# Patient Record
Sex: FEMALE | Race: OTHER | NOT HISPANIC OR LATINO | Employment: FULL TIME | URBAN - METROPOLITAN AREA
[De-identification: names, ages, dates, MRNs, and addresses within clinical notes are randomized per-mention and may not be internally consistent; named-entity substitution may affect disease eponyms.]

---

## 2022-02-01 ENCOUNTER — APPOINTMENT (OUTPATIENT)
Dept: LAB | Facility: CLINIC | Age: 47
End: 2022-02-01

## 2022-02-01 ENCOUNTER — APPOINTMENT (OUTPATIENT)
Dept: URGENT CARE | Facility: CLINIC | Age: 47
End: 2022-02-01

## 2022-02-01 DIAGNOSIS — Z02.1 PRE-EMPLOYMENT EXAMINATION: Primary | ICD-10-CM

## 2022-02-01 DIAGNOSIS — Z02.1 PRE-EMPLOYMENT EXAMINATION: ICD-10-CM

## 2022-02-01 LAB — RUBV IGG SERPL IA-ACNC: >175 IU/ML

## 2022-02-01 PROCEDURE — 86762 RUBELLA ANTIBODY: CPT

## 2022-02-01 PROCEDURE — 86480 TB TEST CELL IMMUN MEASURE: CPT

## 2022-02-01 PROCEDURE — 36415 COLL VENOUS BLD VENIPUNCTURE: CPT

## 2022-02-01 PROCEDURE — 86765 RUBEOLA ANTIBODY: CPT

## 2022-02-01 PROCEDURE — 86787 VARICELLA-ZOSTER ANTIBODY: CPT

## 2022-02-01 PROCEDURE — 86735 MUMPS ANTIBODY: CPT

## 2022-02-03 LAB
GAMMA INTERFERON BACKGROUND BLD IA-ACNC: 0.1 IU/ML
M TB IFN-G BLD-IMP: NEGATIVE
M TB IFN-G CD4+ BCKGRND COR BLD-ACNC: -0.01 IU/ML
M TB IFN-G CD4+ BCKGRND COR BLD-ACNC: 0.01 IU/ML
MEV IGG SER QL: ABNORMAL
MITOGEN IGNF BCKGRD COR BLD-ACNC: >10 IU/ML
MUV IGG SER QL: NORMAL
VZV IGG SER IA-ACNC: NORMAL

## 2022-02-23 DIAGNOSIS — Z01.84 IMMUNITY STATUS TESTING: Primary | ICD-10-CM

## 2022-02-24 ENCOUNTER — APPOINTMENT (OUTPATIENT)
Dept: LAB | Facility: HOSPITAL | Age: 47
End: 2022-02-24

## 2022-02-24 DIAGNOSIS — Z01.84 IMMUNITY STATUS TESTING: ICD-10-CM

## 2022-02-24 PROCEDURE — 36415 COLL VENOUS BLD VENIPUNCTURE: CPT

## 2022-02-24 PROCEDURE — 86765 RUBEOLA ANTIBODY: CPT

## 2022-02-28 LAB — MEV IGG SER QL: NORMAL

## 2022-03-18 ENCOUNTER — APPOINTMENT (OUTPATIENT)
Dept: LAB | Facility: CLINIC | Age: 47
End: 2022-03-18
Payer: COMMERCIAL

## 2022-03-18 ENCOUNTER — OFFICE VISIT (OUTPATIENT)
Dept: FAMILY MEDICINE CLINIC | Facility: CLINIC | Age: 47
End: 2022-03-18
Payer: COMMERCIAL

## 2022-03-18 VITALS
HEIGHT: 65 IN | HEART RATE: 79 BPM | BODY MASS INDEX: 42.42 KG/M2 | OXYGEN SATURATION: 97 % | WEIGHT: 254.6 LBS | SYSTOLIC BLOOD PRESSURE: 136 MMHG | DIASTOLIC BLOOD PRESSURE: 92 MMHG

## 2022-03-18 DIAGNOSIS — E55.9 VITAMIN D DEFICIENCY: ICD-10-CM

## 2022-03-18 DIAGNOSIS — K21.9 GASTROESOPHAGEAL REFLUX DISEASE WITHOUT ESOPHAGITIS: ICD-10-CM

## 2022-03-18 DIAGNOSIS — I10 BENIGN ESSENTIAL HYPERTENSION: Primary | ICD-10-CM

## 2022-03-18 DIAGNOSIS — G47.00 INSOMNIA, UNSPECIFIED TYPE: ICD-10-CM

## 2022-03-18 DIAGNOSIS — E66.01 MORBID OBESITY WITH BMI OF 40.0-44.9, ADULT (HCC): ICD-10-CM

## 2022-03-18 DIAGNOSIS — Z83.3 FH: DIABETES MELLITUS: ICD-10-CM

## 2022-03-18 DIAGNOSIS — Z12.39 SCREENING BREAST EXAMINATION: ICD-10-CM

## 2022-03-18 PROBLEM — G47.09 OTHER INSOMNIA: Status: ACTIVE | Noted: 2022-03-18

## 2022-03-18 LAB
25(OH)D3 SERPL-MCNC: 34.9 NG/ML (ref 30–100)
ALBUMIN SERPL BCP-MCNC: 3.8 G/DL (ref 3.5–5)
ALP SERPL-CCNC: 79 U/L (ref 46–116)
ALT SERPL W P-5'-P-CCNC: 25 U/L (ref 12–78)
ANION GAP SERPL CALCULATED.3IONS-SCNC: 7 MMOL/L (ref 4–13)
AST SERPL W P-5'-P-CCNC: 22 U/L (ref 5–45)
BASOPHILS # BLD AUTO: 0.04 THOUSANDS/ΜL (ref 0–0.1)
BASOPHILS NFR BLD AUTO: 1 % (ref 0–1)
BILIRUB SERPL-MCNC: 0.51 MG/DL (ref 0.2–1)
BUN SERPL-MCNC: 12 MG/DL (ref 5–25)
CALCIUM SERPL-MCNC: 9.4 MG/DL (ref 8.3–10.1)
CHLORIDE SERPL-SCNC: 103 MMOL/L (ref 100–108)
CHOLEST SERPL-MCNC: 189 MG/DL
CO2 SERPL-SCNC: 28 MMOL/L (ref 21–32)
CREAT SERPL-MCNC: 0.71 MG/DL (ref 0.6–1.3)
EOSINOPHIL # BLD AUTO: 0.31 THOUSAND/ΜL (ref 0–0.61)
EOSINOPHIL NFR BLD AUTO: 4 % (ref 0–6)
ERYTHROCYTE [DISTWIDTH] IN BLOOD BY AUTOMATED COUNT: 16.1 % (ref 11.6–15.1)
EST. AVERAGE GLUCOSE BLD GHB EST-MCNC: 134 MG/DL
GFR SERPL CREATININE-BSD FRML MDRD: 103 ML/MIN/1.73SQ M
GLUCOSE P FAST SERPL-MCNC: 108 MG/DL (ref 65–99)
HBA1C MFR BLD: 6.3 %
HCT VFR BLD AUTO: 38.2 % (ref 34.8–46.1)
HDLC SERPL-MCNC: 56 MG/DL
HGB BLD-MCNC: 11.8 G/DL (ref 11.5–15.4)
IMM GRANULOCYTES # BLD AUTO: 0.04 THOUSAND/UL (ref 0–0.2)
IMM GRANULOCYTES NFR BLD AUTO: 1 % (ref 0–2)
LDLC SERPL CALC-MCNC: 119 MG/DL (ref 0–100)
LYMPHOCYTES # BLD AUTO: 2.78 THOUSANDS/ΜL (ref 0.6–4.47)
LYMPHOCYTES NFR BLD AUTO: 34 % (ref 14–44)
MCH RBC QN AUTO: 23.7 PG (ref 26.8–34.3)
MCHC RBC AUTO-ENTMCNC: 30.9 G/DL (ref 31.4–37.4)
MCV RBC AUTO: 77 FL (ref 82–98)
MONOCYTES # BLD AUTO: 0.61 THOUSAND/ΜL (ref 0.17–1.22)
MONOCYTES NFR BLD AUTO: 7 % (ref 4–12)
NEUTROPHILS # BLD AUTO: 4.43 THOUSANDS/ΜL (ref 1.85–7.62)
NEUTS SEG NFR BLD AUTO: 53 % (ref 43–75)
NRBC BLD AUTO-RTO: 0 /100 WBCS
PLATELET # BLD AUTO: 333 THOUSANDS/UL (ref 149–390)
PMV BLD AUTO: 11.3 FL (ref 8.9–12.7)
POTASSIUM SERPL-SCNC: 3.4 MMOL/L (ref 3.5–5.3)
PROT SERPL-MCNC: 7.7 G/DL (ref 6.4–8.2)
RBC # BLD AUTO: 4.97 MILLION/UL (ref 3.81–5.12)
SODIUM SERPL-SCNC: 138 MMOL/L (ref 136–145)
TRIGL SERPL-MCNC: 69 MG/DL
TSH SERPL DL<=0.05 MIU/L-ACNC: 1.78 UIU/ML (ref 0.36–3.74)
WBC # BLD AUTO: 8.21 THOUSAND/UL (ref 4.31–10.16)

## 2022-03-18 PROCEDURE — 99204 OFFICE O/P NEW MOD 45 MIN: CPT | Performed by: PHYSICIAN ASSISTANT

## 2022-03-18 PROCEDURE — 83036 HEMOGLOBIN GLYCOSYLATED A1C: CPT | Performed by: PHYSICIAN ASSISTANT

## 2022-03-18 PROCEDURE — 80053 COMPREHEN METABOLIC PANEL: CPT | Performed by: PHYSICIAN ASSISTANT

## 2022-03-18 PROCEDURE — 84443 ASSAY THYROID STIM HORMONE: CPT | Performed by: PHYSICIAN ASSISTANT

## 2022-03-18 PROCEDURE — 36415 COLL VENOUS BLD VENIPUNCTURE: CPT | Performed by: PHYSICIAN ASSISTANT

## 2022-03-18 PROCEDURE — 82306 VITAMIN D 25 HYDROXY: CPT | Performed by: PHYSICIAN ASSISTANT

## 2022-03-18 PROCEDURE — 85025 COMPLETE CBC W/AUTO DIFF WBC: CPT | Performed by: PHYSICIAN ASSISTANT

## 2022-03-18 PROCEDURE — 80061 LIPID PANEL: CPT | Performed by: PHYSICIAN ASSISTANT

## 2022-03-18 RX ORDER — OMEPRAZOLE 40 MG/1
40 CAPSULE, DELAYED RELEASE ORAL
Qty: 30 CAPSULE | Refills: 0 | Status: SHIPPED | OUTPATIENT
Start: 2022-03-18

## 2022-03-18 RX ORDER — VERAPAMIL HYDROCHLORIDE 120 MG/1
120 CAPSULE, EXTENDED RELEASE ORAL DAILY
COMMUNITY
Start: 2022-03-07 | End: 2022-07-27 | Stop reason: SDUPTHER

## 2022-03-18 RX ORDER — LOSARTAN POTASSIUM 100 MG/1
100 TABLET ORAL DAILY
COMMUNITY
Start: 2022-03-07

## 2022-03-18 RX ORDER — ACETAMINOPHEN 160 MG
50 TABLET,DISINTEGRATING ORAL DAILY
COMMUNITY
Start: 2022-03-07 | End: 2022-06-01 | Stop reason: SDUPTHER

## 2022-03-18 RX ORDER — CHLORTHALIDONE 25 MG/1
12.5 TABLET ORAL DAILY
COMMUNITY
Start: 2022-03-07

## 2022-03-18 RX ORDER — AMITRIPTYLINE HYDROCHLORIDE 10 MG/1
20 TABLET, FILM COATED ORAL DAILY
COMMUNITY
Start: 2022-03-07

## 2022-03-18 NOTE — PATIENT INSTRUCTIONS
Assessment/plan:  1  Benign essential hypertension-presently stable with losartan, chlorthalidone, and verapamil, no medication changes  2   GERD -not at goal   Treatment options were discussed and I would recommend she try a 4 week course of omeprazole 40 mg once daily to see if she has benefit  3   Insomnia-patient does work 3rd shift and has difficulty with sleep during the day  She continues amitriptyline 20 mg at her bedtime which has been helpful  4   Obesity-continue with healthy diet and exercise plan  5   Screening for breast cancer -mammogram ordered  6   Cerumen impaction-recommend trial of Debrox over-the-counter  If symptoms worsen or she has hearing loss recommend follow-up for lavage

## 2022-03-18 NOTE — PROGRESS NOTES
Assessment and Plan:  Patient Instructions     Assessment/plan:  1  Benign essential hypertension-presently stable with losartan, chlorthalidone, and verapamil, no medication changes  2   GERD -not at goal   Treatment options were discussed and I would recommend she try a 4 week course of omeprazole 40 mg once daily to see if she has benefit  3   Insomnia-patient does work 3rd shift and has difficulty with sleep during the day  She continues amitriptyline 20 mg at her bedtime which has been helpful  4   Obesity-continue with healthy diet and exercise plan  5   Screening for breast cancer -mammogram ordered  6   Cerumen impaction-recommend trial of Debrox over-the-counter  If symptoms worsen or she has hearing loss recommend follow-up for lavage        Problem List Items Addressed This Visit        Cardiovascular and Mediastinum    Benign essential hypertension - Primary    Relevant Medications    losartan (COZAAR) 100 MG tablet    verapamil (VERELAN PM) 120 MG 24 hr capsule    chlorthalidone 25 mg tablet    Other Relevant Orders    CBC and differential    Comprehensive metabolic panel    Lipid Panel with Direct LDL reflex    TSH, 3rd generation with Free T4 reflex    Vitamin D 25 hydroxy       Other    Vitamin D deficiency    Relevant Orders    CBC and differential    Comprehensive metabolic panel    Lipid Panel with Direct LDL reflex    TSH, 3rd generation with Free T4 reflex    Vitamin D 25 hydroxy    Morbid obesity with BMI of 40 0-44 9, adult (HCC)      Other Visit Diagnoses     Insomnia, unspecified type        Gastroesophageal reflux disease without esophagitis        Relevant Medications    omeprazole (PriLOSEC) 40 MG capsule    FH: diabetes mellitus        Relevant Orders    Hemoglobin A1C    Screening breast examination        Relevant Orders    Mammo screening bilateral w 3d & cad                 Diagnoses and all orders for this visit:    Benign essential hypertension  -     CBC and differential  -     Comprehensive metabolic panel  -     Lipid Panel with Direct LDL reflex  -     TSH, 3rd generation with Free T4 reflex  -     Vitamin D 25 hydroxy    Vitamin D deficiency  -     CBC and differential  -     Comprehensive metabolic panel  -     Lipid Panel with Direct LDL reflex  -     TSH, 3rd generation with Free T4 reflex  -     Vitamin D 25 hydroxy    Insomnia, unspecified type    Gastroesophageal reflux disease without esophagitis  -     omeprazole (PriLOSEC) 40 MG capsule; Take 1 capsule (40 mg total) by mouth daily before breakfast    FH: diabetes mellitus  -     Hemoglobin A1C    Screening breast examination  -     Mammo screening bilateral w 3d & cad; Future    Morbid obesity with BMI of 40 0-44 9, adult ([facility] [address] )    Other orders  -     amitriptyline (ELAVIL) 10 mg tablet; Take 20 mg by mouth in the morning  -     losartan (COZAAR) 100 MG tablet; Take 100 mg by mouth in the morning  -     verapamil (VERELAN PM) 120 MG 24 hr capsule; Take 120 mg by mouth in the morning  -     Cholecalciferol (Vitamin D3) 50 MCG (2000 UT) capsule; Take 50 Units by mouth in the morning 50 mcg  -     chlorthalidone 25 mg tablet; Take 12 5 mg by mouth in the morning              Subjective:      Patient ID: Kandis Iyer is a 40 y o  female  CC:    Chief Complaint   Patient presents with    Establish Care       HPI:     HPI: This is a 76-year-old female who presents to the office as a new patient  She has recently taken a job with [employer] and changed from [facility]  She has been up-to-date with her routine screening test but states she is due for mammogram   She has a history of hypertension which has been controlled with chlorthalidone, verapamil, and losartan  She also is works night shift and uses amitriptyline 20 mg at bedtime to help her sleep  She does complain of some increased symptoms of esophageal reflux and this has been bothering her more than 2 times per week now    She has not had any blood in the stools or dark black stools  She does have significant family history of diabetes present  She also has high blood pressure in the family but no heart disease at a young age  The following portions of the patient's history were reviewed and updated as appropriate: allergies, current medications, past family history, past medical history, past social history, past surgical history and problem list       Review of Systems   Constitutional: Negative for chills, fatigue and fever  HENT: Negative for congestion, ear pain and sinus pressure  Eyes: Negative for visual disturbance  Respiratory: Negative for cough, chest tightness and shortness of breath  Cardiovascular: Negative for chest pain and palpitations  Gastrointestinal: Negative for diarrhea, nausea and vomiting  Endocrine: Negative for polyuria  Genitourinary: Negative for dysuria and frequency  Musculoskeletal: Negative for arthralgias and myalgias  Skin: Negative for pallor and rash  Neurological: Negative for dizziness, weakness, light-headedness, numbness and headaches  Psychiatric/Behavioral: Negative for agitation, behavioral problems and sleep disturbance  All other systems reviewed and are negative  Data to review:       Objective:    Vitals:    03/18/22 0807   BP: 136/92   BP Location: Left arm   Patient Position: Sitting   Cuff Size: Large   Pulse: 79   SpO2: 97%   Weight: 115 kg (254 lb 9 6 oz)   Height: 5' 5" (1 651 m)        Physical Exam  Constitutional:       General: She is not in acute distress  Appearance: Normal appearance  HENT:      Head: Normocephalic and atraumatic  Right Ear: Tympanic membrane normal       Left Ear: Tympanic membrane normal       Nose: No congestion or rhinorrhea  Eyes:      Conjunctiva/sclera: Conjunctivae normal       Pupils: Pupils are equal, round, and reactive to light  Neck:      Vascular: No carotid bruit     Cardiovascular:      Rate and Rhythm: Normal rate and regular rhythm  Heart sounds: No murmur heard  Pulmonary:      Effort: Pulmonary effort is normal  No respiratory distress  Breath sounds: Normal breath sounds  Abdominal:      Palpations: Abdomen is soft  Musculoskeletal:         General: Normal range of motion  Cervical back: Normal range of motion and neck supple  No muscular tenderness  Lymphadenopathy:      Cervical: No cervical adenopathy  Skin:     General: Skin is warm  Capillary Refill: Capillary refill takes less than 2 seconds  Neurological:      General: No focal deficit present  Mental Status: She is alert and oriented to person, place, and time  Psychiatric:         Mood and Affect: Mood normal              Depression Screening and Follow-up Plan: Patient was screened for depression during today's encounter  They screened negative with a PHQ-2 score of 0  BMI Counseling: Body mass index is 42 37 kg/m²  The BMI is above normal  Nutrition recommendations include reducing portion sizes

## 2022-03-21 ENCOUNTER — TELEPHONE (OUTPATIENT)
Dept: FAMILY MEDICINE CLINIC | Facility: CLINIC | Age: 47
End: 2022-03-21

## 2022-03-21 DIAGNOSIS — G47.00 INSOMNIA, UNSPECIFIED TYPE: ICD-10-CM

## 2022-03-21 DIAGNOSIS — K21.9 GASTROESOPHAGEAL REFLUX DISEASE WITHOUT ESOPHAGITIS: ICD-10-CM

## 2022-03-21 DIAGNOSIS — I10 BENIGN ESSENTIAL HYPERTENSION: Primary | ICD-10-CM

## 2022-03-21 DIAGNOSIS — E66.01 MORBID OBESITY WITH BMI OF 40.0-44.9, ADULT (HCC): ICD-10-CM

## 2022-03-21 DIAGNOSIS — E55.9 VITAMIN D DEFICIENCY: ICD-10-CM

## 2022-03-21 NOTE — TELEPHONE ENCOUNTER
----- Message from Oneal Veliz, [address] [place] sent at 3/21/2022  9:13 AM EDT -----  Patient notified of results and recommendations    Please submit Lab script to mail to patient per her request

## 2022-05-25 ENCOUNTER — APPOINTMENT (OUTPATIENT)
Dept: LAB | Facility: HOSPITAL | Age: 47
End: 2022-05-25

## 2022-05-25 DIAGNOSIS — Z00.8 ENCOUNTER FOR OTHER GENERAL EXAMINATION: ICD-10-CM

## 2022-05-25 LAB
CHOLEST SERPL-MCNC: 176 MG/DL
EST. AVERAGE GLUCOSE BLD GHB EST-MCNC: 134 MG/DL
HBA1C MFR BLD: 6.3 %
HDLC SERPL-MCNC: 51 MG/DL
LDLC SERPL CALC-MCNC: 110 MG/DL (ref 0–100)
NONHDLC SERPL-MCNC: 125 MG/DL
TRIGL SERPL-MCNC: 76 MG/DL

## 2022-05-25 PROCEDURE — 80061 LIPID PANEL: CPT

## 2022-05-25 PROCEDURE — 83036 HEMOGLOBIN GLYCOSYLATED A1C: CPT

## 2022-05-25 PROCEDURE — 36415 COLL VENOUS BLD VENIPUNCTURE: CPT

## 2022-06-01 DIAGNOSIS — E55.9 VITAMIN D DEFICIENCY: Primary | ICD-10-CM

## 2022-06-01 DIAGNOSIS — E55.9 VITAMIN D DEFICIENCY: ICD-10-CM

## 2022-06-01 RX ORDER — ACETAMINOPHEN 160 MG
2000 TABLET,DISINTEGRATING ORAL DAILY
Qty: 30 CAPSULE | Refills: 0 | Status: SHIPPED | OUTPATIENT
Start: 2022-06-01 | End: 2022-07-07 | Stop reason: SDUPTHER

## 2022-06-01 RX ORDER — ACETAMINOPHEN 160 MG
2000 TABLET,DISINTEGRATING ORAL DAILY
Qty: 30 CAPSULE | Refills: 5 | Status: SHIPPED | OUTPATIENT
Start: 2022-06-01 | End: 2022-06-01 | Stop reason: SDUPTHER

## 2022-06-01 NOTE — TELEPHONE ENCOUNTER
Requested Prescriptions     Pending Prescriptions Disp Refills    Cholecalciferol (Vitamin D3) 50 MCG (2000 UT) capsule 30 capsule 0     Sig: Take 1 capsule (2,000 Units total) by mouth in the morning 50 mcg     LOV 3/18/22, F/U 6/6/22, Labs pending

## 2022-07-07 ENCOUNTER — OFFICE VISIT (OUTPATIENT)
Dept: FAMILY MEDICINE CLINIC | Facility: CLINIC | Age: 47
End: 2022-07-07
Payer: COMMERCIAL

## 2022-07-07 VITALS
HEART RATE: 82 BPM | BODY MASS INDEX: 44.82 KG/M2 | WEIGHT: 269 LBS | OXYGEN SATURATION: 97 % | HEIGHT: 65 IN | TEMPERATURE: 97.9 F | DIASTOLIC BLOOD PRESSURE: 78 MMHG | SYSTOLIC BLOOD PRESSURE: 124 MMHG

## 2022-07-07 DIAGNOSIS — J01.00 ACUTE NON-RECURRENT MAXILLARY SINUSITIS: ICD-10-CM

## 2022-07-07 DIAGNOSIS — E66.01 MORBID OBESITY WITH BMI OF 40.0-44.9, ADULT (HCC): ICD-10-CM

## 2022-07-07 DIAGNOSIS — E55.9 VITAMIN D DEFICIENCY: ICD-10-CM

## 2022-07-07 DIAGNOSIS — I10 BENIGN ESSENTIAL HYPERTENSION: ICD-10-CM

## 2022-07-07 DIAGNOSIS — Z12.11 SCREENING FOR COLON CANCER: Primary | ICD-10-CM

## 2022-07-07 PROCEDURE — 99214 OFFICE O/P EST MOD 30 MIN: CPT | Performed by: PHYSICIAN ASSISTANT

## 2022-07-07 RX ORDER — ACETAMINOPHEN 160 MG
2000 TABLET,DISINTEGRATING ORAL DAILY
Qty: 30 CAPSULE | Refills: 0 | Status: SHIPPED | OUTPATIENT
Start: 2022-07-07 | End: 2022-08-03

## 2022-07-07 RX ORDER — CEFUROXIME AXETIL 500 MG/1
500 TABLET ORAL EVERY 12 HOURS SCHEDULED
Qty: 20 TABLET | Refills: 0 | Status: SHIPPED | OUTPATIENT
Start: 2022-07-07 | End: 2022-07-17

## 2022-07-07 NOTE — PROGRESS NOTES
Assessment and Plan:  Patient Instructions     Assessment/plan:   Acute sinusitis-will start patient on Ceftin 500 mg twice daily for 10 days  She has had multiple negative COVID test at home  Continue supportive care with Mucinex and rest   Follow up if symptoms persist/ worsen in the next 7-10 days  2   Obesity-recommend evaluation by [facility] weight management team   Consider medication assisted weight loss  3  Hypertension -stable with current regimen, no medication changes  Recommend avoiding decongestants as they may raise the blood pressure  Problem List Items Addressed This Visit        Cardiovascular and Mediastinum    Benign essential hypertension       Other    Morbid obesity with BMI of 40 0-44 9, adult [facility])    Relevant Orders    Ambulatory Referral to Weight Management      Other Visit Diagnoses     Screening for colon cancer    -  Primary    Relevant Orders    Ambulatory referral for colonoscopy    Acute non-recurrent maxillary sinusitis        Relevant Medications    cefuroxime (CEFTIN) 500 mg tablet                 Diagnoses and all orders for this visit:    Screening for colon cancer  -     Ambulatory referral for colonoscopy; Future    Acute non-recurrent maxillary sinusitis  -     cefuroxime (CEFTIN) 500 mg tablet; Take 1 tablet (500 mg total) by mouth every 12 (twelve) hours for 10 days    Morbid obesity with BMI of 40 0-44 9, adult ([facility] )  -     Ambulatory Referral to Weight Management; Future    Benign essential hypertension            Subjective:      Patient ID: Artur Orourke is a 40 y o  female  CC:    Chief Complaint   Patient presents with    Cough     Patient complaints of cough and headache for the past 2 weeks  HPI:      HPI:  This is a 81-year-old female who presents to the office with concerns over head congestion cold which has been going on for at least 2 weeks  She has been feeling fatigued and run down but has not had any fevers present    She is having a bit of cough and headache and head pressure  She has not had any ear pain or discomfort  She has been taking some over-the-counter Mucinex and continues with congestion  Initially some of her mucus was more thick but feels that it has become less so  She would also like to discuss weight loss  She has been gaining weight despite her efforts  The following portions of the patient's history were reviewed and updated as appropriate: allergies, current medications, past family history, past medical history, past social history, past surgical history and problem list       Review of Systems   Constitutional: Negative for chills, fatigue and fever  HENT: Negative for congestion, ear pain and sinus pressure  Eyes: Negative for visual disturbance  Respiratory: Negative for cough, chest tightness and shortness of breath  Cardiovascular: Negative for chest pain and palpitations  Gastrointestinal: Negative for diarrhea, nausea and vomiting  Endocrine: Negative for polyuria  Genitourinary: Negative for dysuria and frequency  Musculoskeletal: Negative for arthralgias and myalgias  Skin: Negative for pallor and rash  Neurological: Negative for dizziness, weakness, light-headedness, numbness and headaches  Psychiatric/Behavioral: Negative for agitation, behavioral problems and sleep disturbance  All other systems reviewed and are negative  Data to review:       Objective:    Vitals:    07/07/22 1439   BP: 124/78   BP Location: Left arm   Patient Position: Sitting   Cuff Size: Large   Pulse: 82   Temp: 97 9 °F (36 6 °C)   TempSrc: Tympanic   SpO2: 97%   Weight: 122 kg (269 lb)   Height: 5' 5" (1 651 m)        Physical Exam  Constitutional:       General: She is not in acute distress  Appearance: Normal appearance  HENT:      Head: Normocephalic and atraumatic  Right Ear: Tympanic membrane normal       Left Ear: Tympanic membrane normal       Nose: No congestion or rhinorrhea  Eyes:      Conjunctiva/sclera: Conjunctivae normal       Pupils: Pupils are equal, round, and reactive to light  Neck:      Vascular: No carotid bruit  Cardiovascular:      Rate and Rhythm: Normal rate and regular rhythm  Heart sounds: No murmur heard  Pulmonary:      Effort: Pulmonary effort is normal  No respiratory distress  Breath sounds: Normal breath sounds  Abdominal:      Palpations: Abdomen is soft  Musculoskeletal:         General: Normal range of motion  Cervical back: Normal range of motion and neck supple  No muscular tenderness  Lymphadenopathy:      Cervical: No cervical adenopathy  Skin:     General: Skin is warm  Capillary Refill: Capillary refill takes less than 2 seconds  Neurological:      General: No focal deficit present  Mental Status: She is alert and oriented to person, place, and time     Psychiatric:         Mood and Affect: Mood normal

## 2022-07-07 NOTE — PATIENT INSTRUCTIONS
Assessment/plan:   Acute sinusitis-will start patient on Ceftin 500 mg twice daily for 10 days  She has had multiple negative COVID test at home  Continue supportive care with Mucinex and rest   Follow up if symptoms persist/ worsen in the next 7-10 days  2   Obesity-recommend evaluation by [facility] weight management team   Consider medication assisted weight loss  3  Hypertension -stable with current regimen, no medication changes  Recommend avoiding decongestants as they may raise the blood pressure

## 2022-07-27 DIAGNOSIS — I10 BENIGN ESSENTIAL HYPERTENSION: Primary | ICD-10-CM

## 2022-07-27 DIAGNOSIS — I10 BENIGN ESSENTIAL HYPERTENSION: ICD-10-CM

## 2022-07-27 RX ORDER — VERAPAMIL HYDROCHLORIDE 120 MG/1
120 CAPSULE, EXTENDED RELEASE ORAL DAILY
Qty: 90 CAPSULE | Refills: 1 | Status: SHIPPED | OUTPATIENT
Start: 2022-07-27 | End: 2022-07-27 | Stop reason: SDUPTHER

## 2022-07-28 RX ORDER — VERAPAMIL HYDROCHLORIDE 120 MG/1
120 CAPSULE, EXTENDED RELEASE ORAL DAILY
Qty: 90 CAPSULE | Refills: 0 | Status: SHIPPED | OUTPATIENT
Start: 2022-07-28 | End: 2022-10-14 | Stop reason: SDUPTHER

## 2022-08-03 DIAGNOSIS — E55.9 VITAMIN D DEFICIENCY: ICD-10-CM

## 2022-08-03 RX ORDER — ACETAMINOPHEN 160 MG
TABLET,DISINTEGRATING ORAL
Qty: 30 CAPSULE | Refills: 0 | Status: SHIPPED | OUTPATIENT
Start: 2022-08-03 | End: 2022-09-07

## 2022-08-03 NOTE — TELEPHONE ENCOUNTER
Requested Prescriptions     Pending Prescriptions Disp Refills    Cholecalciferol (Vitamin D3) 50 MCG (2000 UT) capsule [Pharmacy Med Name: VITAMIN D3 50 MCG (2,000 UNIT)] 30 capsule 0     Sig: TAKE ONE CAPSULE BY MOUTH EVERY MORNING     LOV 7/7/22, F/U non scheduled, Labs some active

## 2022-09-06 ENCOUNTER — OFFICE VISIT (OUTPATIENT)
Dept: OBGYN CLINIC | Facility: MEDICAL CENTER | Age: 47
End: 2022-09-06
Payer: COMMERCIAL

## 2022-09-06 ENCOUNTER — APPOINTMENT (OUTPATIENT)
Dept: RADIOLOGY | Facility: MEDICAL CENTER | Age: 47
End: 2022-09-06
Payer: COMMERCIAL

## 2022-09-06 VITALS
HEART RATE: 79 BPM | DIASTOLIC BLOOD PRESSURE: 82 MMHG | SYSTOLIC BLOOD PRESSURE: 137 MMHG | HEIGHT: 65 IN | BODY MASS INDEX: 44.98 KG/M2 | WEIGHT: 270 LBS

## 2022-09-06 DIAGNOSIS — M17.0 PRIMARY OSTEOARTHRITIS OF BOTH KNEES: Primary | ICD-10-CM

## 2022-09-06 DIAGNOSIS — M25.561 CHRONIC PAIN OF BOTH KNEES: ICD-10-CM

## 2022-09-06 DIAGNOSIS — M25.562 CHRONIC PAIN OF BOTH KNEES: ICD-10-CM

## 2022-09-06 DIAGNOSIS — M17.0 ARTHRITIS OF BOTH KNEES: ICD-10-CM

## 2022-09-06 DIAGNOSIS — G89.29 CHRONIC PAIN OF BOTH KNEES: ICD-10-CM

## 2022-09-06 PROCEDURE — 73564 X-RAY EXAM KNEE 4 OR MORE: CPT

## 2022-09-06 PROCEDURE — 99204 OFFICE O/P NEW MOD 45 MIN: CPT | Performed by: ORTHOPAEDIC SURGERY

## 2022-09-06 PROCEDURE — 20610 DRAIN/INJ JOINT/BURSA W/O US: CPT | Performed by: ORTHOPAEDIC SURGERY

## 2022-09-06 RX ORDER — TRIAMCINOLONE ACETONIDE 40 MG/ML
20 INJECTION, SUSPENSION INTRA-ARTICULAR; INTRAMUSCULAR
Status: COMPLETED | OUTPATIENT
Start: 2022-09-06 | End: 2022-09-06

## 2022-09-06 RX ORDER — BUPIVACAINE HYDROCHLORIDE 2.5 MG/ML
2 INJECTION, SOLUTION INFILTRATION; PERINEURAL
Status: COMPLETED | OUTPATIENT
Start: 2022-09-06 | End: 2022-09-06

## 2022-09-06 RX ORDER — DICLOFENAC SODIUM 75 MG/1
75 TABLET, DELAYED RELEASE ORAL 2 TIMES DAILY PRN
Qty: 60 TABLET | Refills: 1 | Status: SHIPPED | OUTPATIENT
Start: 2022-09-06

## 2022-09-06 RX ADMIN — BUPIVACAINE HYDROCHLORIDE 2 ML: 2.5 INJECTION, SOLUTION INFILTRATION; PERINEURAL at 09:03

## 2022-09-06 RX ADMIN — TRIAMCINOLONE ACETONIDE 20 MG: 40 INJECTION, SUSPENSION INTRA-ARTICULAR; INTRAMUSCULAR at 09:03

## 2022-09-06 NOTE — PROGRESS NOTES
Assessment/Plan     1  Primary osteoarthritis of both knees    2  Chronic pain of both knees      Orders Placed This Encounter   Procedures    Large joint arthrocentesis: bilateral knee    Brace    XR knee 4+ vw right injury    XR knee 4+ vw left injury    Ambulatory Referral to Physical Therapy     -Non-operative treatments were discussed with the patient in the forms of corticosteroid injections, formal physical therapy, prescription mediation of diclofenac, bracing and viscosupplementation  -A prescription for Diclofenac was sent into the patient's pharmacy  -Discussed with the patient that she should stop taking Aleve while taking the diclofenac orally  She may continue using the diclofenac gel  -A prescription for formal physical therapy was provided to the patient at today's visit  I discussed with the patient I would recommend her going to formal physical therapy since she does exhibit limited range of motion   -She was fitted for bilateral hinge knee braces at today's visit  -Received bilateral knee steroid injection today  Patient should ice and avoid strenuous activity for 1-2 days if needed  Patient should avoid vaccines for 2 weeks if possible  If patient is diabetic should also monitor glucose over the next 7 to 10 days     -Discussed with the patient that in order for her to be a surgical candidate her BMI would have to be at 40 or below    Return in about 3 months (around 12/6/2022)  I answered all of the patient's questions during the visit and provided education of the patient's condition during the visit  The patient verbalized understanding of the information given and agrees with the plan  This note was dictated using [employer] software  It may contain errors including improperly dictated words  Please contact physician directly for any questions      History of Present Illness   Chief complaint:   Chief Complaint   Patient presents with    Right Knee - Pain    Left Knee - Pain HPI: Jessica Bello is a 40 y o  female that c/o bilateral knee pain  She states that her bilateral knee pain began years ago with no specific mechanism of injury or trauma  She states that her bilateral knee knee has progressively gotten worse over the past couple of years  She states that her right knee is worse than her left knee  She states that she will experience a daily intermittent sharp pain  She localizes her pain over the medial aspect of her bilateral knees  She states that her pain is exacerbated with weight-bearing activities especially ambulating down stairs    She will use Aleve and Voltaren gel to help alleviate her pain  She denies any surgeries or previous injuries to her bilateral knees  She states that she previously treated with [facility] and underwent bilateral knee corticosteroid injections and formal physical therapy  She states that the injections were about 1 year ago with no significant improvement of her bilateral knee pain  She denies diabetes  She denies smoking  ROS:    See HPI for musculoskeletal review  All other systems reviewed are negative     Historical Information   No past medical history on file  No past surgical history on file  Social History   Social History     Substance and Sexual Activity   Alcohol Use Never     Social History     Substance and Sexual Activity   Drug Use Never     Social History     Tobacco Use   Smoking Status Never Smoker   Smokeless Tobacco Never Used     Family History: No family history on file      Current Outpatient Medications on File Prior to Visit   Medication Sig Dispense Refill    amitriptyline (ELAVIL) 10 mg tablet Take 20 mg by mouth in the morning      chlorthalidone 25 mg tablet Take 12 5 mg by mouth in the morning      Cholecalciferol (Vitamin D3) 50 MCG (2000 UT) capsule TAKE ONE CAPSULE BY MOUTH EVERY MORNING 30 capsule 0    losartan (COZAAR) 100 MG tablet Take 100 mg by mouth in the morning  omeprazole (PriLOSEC) 40 MG capsule Take 1 capsule (40 mg total) by mouth daily before breakfast 30 capsule 0    verapamil (VERELAN PM) 120 MG 24 hr capsule Take 1 capsule (120 mg total) by mouth in the morning 90 capsule 0     No current facility-administered medications on file prior to visit  No Known Allergies    Current Outpatient Medications on File Prior to Visit   Medication Sig Dispense Refill    amitriptyline (ELAVIL) 10 mg tablet Take 20 mg by mouth in the morning      chlorthalidone 25 mg tablet Take 12 5 mg by mouth in the morning      Cholecalciferol (Vitamin D3) 50 MCG (2000 UT) capsule TAKE ONE CAPSULE BY MOUTH EVERY MORNING 30 capsule 0    losartan (COZAAR) 100 MG tablet Take 100 mg by mouth in the morning      omeprazole (PriLOSEC) 40 MG capsule Take 1 capsule (40 mg total) by mouth daily before breakfast 30 capsule 0    verapamil (VERELAN PM) 120 MG 24 hr capsule Take 1 capsule (120 mg total) by mouth in the morning 90 capsule 0     No current facility-administered medications on file prior to visit  Objective   Vitals: Blood pressure 137/82, pulse 79, height 5' 5" (1 651 m), weight 122 kg (270 lb)  ,Body mass index is 44 93 kg/m²      PE:  AAOx 3  WDWN  Hearing intact, no drainage from eyes  Regular rate  no audible wheezing  no abdominal distension  LE compartments soft, skin intact    bilateralknee:    Appearance:  Mild swelling   No ecchymosis  no obvious joint deformity   No effusion  Palpation/Tenderness:  +TTP over medial joint line  + TTP over lateral joint line   + TTP over patella  No TTP over patellar tendon  No TTP over pes anserine bursa  Active Range of Motion:  AROM: left 3-95, right 3-95  PROM: left 0-110, right 0-105  Special Tests:  Medial Eunice's Test:  Positive  Lateral Eunice's Test:  Negative  Apley's compression test:  Negative  Lachman's Test:  negative  Anterior Drawer Test:  Negative  Patellar grind:  Negative  Valgus Stress Test:  negative  Varus Stress Test:  negative     No ipsilateral hip pain with ROM    bilateralLE:    Sensation grossly intact  Palpable dorsalis  pulse  AT/GS/EHL intact    Imaging Studies: I have personally reviewed pertinent films in PACS  XR bilateralknee:  Severe medial and patellofemoral compartmental osteoarthritis  X-ray's demonstrates joint space narrowing, sclerosis and osteophytes  There are no acute fractures, dislocations, lytic or blastic lesions  Large joint arthrocentesis: bilateral knee  Universal Protocol:  Consent: Verbal consent obtained  Risks and benefits: risks, benefits and alternatives were discussed  Consent given by: patient  Time out: Immediately prior to procedure a "time out" was called to verify the correct patient, procedure, equipment, support staff and site/side marked as required    Timeout called at: 9/6/2022 9:02 AM   Site marked: the operative site was marked  Patient identity confirmed: verbally with patient    Supporting Documentation  Indications: pain   Procedure Details  Location: knee - bilateral knee  Preparation: Patient was prepped and draped in the usual sterile fashion  Needle size: 22 G  Ultrasound guidance: no  Approach: anterolateral    Medications (Right): 20 mg triamcinolone acetonide 40 mg/mL; 2 mL bupivacaine 0 25 %Medications (Left): 20 mg triamcinolone acetonide 40 mg/mL; 2 mL bupivacaine 0 25 %   Patient tolerance: patient tolerated the procedure well with no immediate complications  Dressing:  Sterile dressing applied          Scribe Attestation    I,:  Romy Barriga am acting as a scribe while in the presence of the attending physician :       I,:  Joie Huff DO personally performed the services described in this documentation    as scribed in my presence :

## 2022-09-07 DIAGNOSIS — E55.9 VITAMIN D DEFICIENCY: ICD-10-CM

## 2022-09-07 RX ORDER — ACETAMINOPHEN 160 MG
TABLET,DISINTEGRATING ORAL
Qty: 30 CAPSULE | Refills: 0 | Status: SHIPPED | OUTPATIENT
Start: 2022-09-07 | End: 2022-10-14 | Stop reason: SDUPTHER

## 2022-09-08 ENCOUNTER — HOSPITAL ENCOUNTER (OUTPATIENT)
Dept: MAMMOGRAPHY | Facility: CLINIC | Age: 47
Discharge: HOME | End: 2022-09-08

## 2022-09-09 ENCOUNTER — HOSPITAL ENCOUNTER (OUTPATIENT)
Dept: MAMMOGRAPHY | Facility: CLINIC | Age: 47
End: 2022-09-09
Payer: COMMERCIAL

## 2022-09-09 VITALS — HEIGHT: 65 IN | WEIGHT: 270 LBS | BODY MASS INDEX: 44.98 KG/M2

## 2022-09-09 DIAGNOSIS — Z12.31 VISIT FOR SCREENING MAMMOGRAM: ICD-10-CM

## 2022-09-09 DIAGNOSIS — Z12.39 SCREENING BREAST EXAMINATION: ICD-10-CM

## 2022-09-09 PROCEDURE — 77067 SCR MAMMO BI INCL CAD: CPT

## 2022-09-09 PROCEDURE — 77063 BREAST TOMOSYNTHESIS BI: CPT

## 2022-09-26 DIAGNOSIS — I10 BENIGN ESSENTIAL HYPERTENSION: Primary | ICD-10-CM

## 2022-09-26 DIAGNOSIS — I10 BENIGN ESSENTIAL HYPERTENSION: ICD-10-CM

## 2022-09-26 RX ORDER — LOSARTAN POTASSIUM 100 MG/1
100 TABLET ORAL DAILY
Qty: 90 TABLET | Refills: 1 | Status: SHIPPED | OUTPATIENT
Start: 2022-09-26 | End: 2022-09-26 | Stop reason: SDUPTHER

## 2022-09-26 NOTE — TELEPHONE ENCOUNTER
Last OV with Office: 7/7/2022   Last visit with PCP : 7/7/2022      Next visit with the Office :Visit date not found   Next visit with PCP : Visit date not found    Pending labs from 3/21/2022 still not completed but Lipid and A1C for caring starts with you

## 2022-09-27 RX ORDER — LOSARTAN POTASSIUM 100 MG/1
100 TABLET ORAL DAILY
Qty: 90 TABLET | Refills: 0 | Status: SHIPPED | OUTPATIENT
Start: 2022-09-27

## 2022-09-27 NOTE — TELEPHONE ENCOUNTER
Requested Prescriptions     Pending Prescriptions Disp Refills    losartan (COZAAR) 100 MG tablet 90 tablet 0     Sig: Take 1 tablet (100 mg total) by mouth in the morning     LOV 7/7/22, F/U non scheduled, Labs active

## 2022-10-06 ENCOUNTER — EVALUATION (OUTPATIENT)
Dept: PHYSICAL THERAPY | Facility: CLINIC | Age: 47
End: 2022-10-06
Payer: COMMERCIAL

## 2022-10-06 DIAGNOSIS — M25.562 CHRONIC PAIN OF BOTH KNEES: ICD-10-CM

## 2022-10-06 DIAGNOSIS — M25.561 CHRONIC PAIN OF BOTH KNEES: ICD-10-CM

## 2022-10-06 DIAGNOSIS — M17.0 PRIMARY OSTEOARTHRITIS OF BOTH KNEES: ICD-10-CM

## 2022-10-06 DIAGNOSIS — G89.29 CHRONIC PAIN OF BOTH KNEES: ICD-10-CM

## 2022-10-06 PROCEDURE — 97161 PT EVAL LOW COMPLEX 20 MIN: CPT | Performed by: PHYSICAL THERAPIST

## 2022-10-06 PROCEDURE — 97112 NEUROMUSCULAR REEDUCATION: CPT | Performed by: PHYSICAL THERAPIST

## 2022-10-06 NOTE — PROGRESS NOTES
PT Evaluation     Today's date: 10/6/2022  Patient name: Garth Enriquez  : 1975  MRN: [id number]  Referring provider: Alban Muhammad  Dx:   Encounter Diagnosis     ICD-10-CM    1  Primary osteoarthritis of both knees  M17 0 Ambulatory Referral to Physical Therapy   2  Chronic pain of both knees  M25 561 Ambulatory Referral to Physical Therapy    M25 562     G89 29                   Assessment  Assessment details: Pt is a 56y o  year old female presenting to physical therapy for Primary osteoarthritis of both knees, Chronic pain of both knees  She presents with the following impairments: gait abnormalities, b/l knee ROM deficits, patellar hypomobility, LE weakness worse on R LE, and TTP along joint lines worse medially b/l affecting her function with walking, standing, navigating stairs, getting out of chairs, and working  Pt will benefit from skilled physical therapy to address functional limitations noted in evaluation and meet patient goals  Impairments: abnormal gait, abnormal muscle firing, abnormal or restricted ROM, abnormal movement, activity intolerance, impaired balance, impaired physical strength, lacks appropriate home exercise program, pain with function, weight-bearing intolerance and poor body mechanics    Symptom irritability: high  Goals  ST  Pt will reduce pain to 5/10  2  Pt will improve R knee flexion to 100 degrees  LT  Pt will improve b/l knee extension strength to 4+/5 for improved ability to get out of chair  2  Pt will improve R knee flexion to 105+ degrees for improved ability to descend stairs  3  Pt will be I w HEP      Plan  Patient would benefit from: PT eval and skilled physical therapy  Planned modality interventions: biofeedback, electrical stimulation/Russian stimulation, cryotherapy, TENS, thermotherapy: hydrocollator packs and unattended electrical stimulation  Planned therapy interventions: manual therapy, abdominal trunk stabilization, joint mobilization, neuromuscular re-education, patient education, strengthening, stretching, therapeutic activities, therapeutic exercise, home exercise program, body mechanics training, balance, balance/weight bearing training, flexibility, gait training and Olivas taping  Frequency: 2x week  Duration in weeks: 6  Treatment plan discussed with: patient        Subjective Evaluation    History of Present Illness  Mechanism of injury: Pt reports b/l knee pain and OA for many years, but worse on her right knee  She has taken some Voltaren tablets as well as injections w some relief  She works at [employer] [place] [employer] and is always on her feet  She has difficulty walking long periods, navigating stairs, and getting out of the chair  She also reports Hx of brain surgery in 2020, but [facility] should be sending her files over soon  Recurrent probem    Pain  Current pain ratin  At worst pain ratin      Diagnostic Tests  X-ray: normal        Objective     Tenderness   Left Knee   Tenderness in the lateral joint line and medial joint line  Right Knee   Tenderness in the lateral joint line and medial joint line       Active Range of Motion   Left Knee   Flexion: 105 degrees   Extension: 0 degrees   Extensor la degrees     Right Knee   Flexion: 92 degrees with pain  Extension: 0 degrees with pain  Extensor la degrees     Passive Range of Motion   Left Knee   Flexion: 110 degrees with pain    Right Knee   Flexion: 95 degrees with pain    Mobility   Patellar Mobility:   Left Knee   Hypomobile: left medial, left lateral, left superior and left inferior    Right Knee   Hypomobile: medial, lateral, superior and inferior     Strength/Myotome Testing     Left Knee   Flexion: 4  Extension: 4+  Quadriceps contraction: fair    Right Knee   Flexion: 3+  Extension: 3+  Quadriceps contraction: poor    Additional Strength Details  SLS < 3secs b/l    Tests     Left Knee   Positive lateral Eunice, medial Eunice and patellar compression  Right Knee   Positive lateral Eunice, medial Eunice and patellar compression  Ambulation     Observational Gait   Gait: antalgic and asymmetric   Decreased walking speed and stride length                Precautions: b/l knee OA    Date 10/6            Visit # 1            FOTO IE             Re-eval IE              Manuals 10/6            B/l knee PROM SF            PFJ Mobs SF Gr I                                      Neuro Re-Ed 10/6            Clams  15x GTB HL            Bridge 2x5            Rockerboard             SLS                                                    Ther Ex 10/6            TM walking             Heel Slides HEP            Quad set             SLR 2x5            S/l hip ABD             Leg Press                                       Ther Activity                                       Gait Training                                       Modalities

## 2022-10-11 ENCOUNTER — APPOINTMENT (OUTPATIENT)
Dept: PHYSICAL THERAPY | Facility: CLINIC | Age: 47
End: 2022-10-11

## 2022-10-12 ENCOUNTER — OFFICE VISIT (OUTPATIENT)
Dept: PHYSICAL THERAPY | Facility: CLINIC | Age: 47
End: 2022-10-12
Payer: COMMERCIAL

## 2022-10-12 DIAGNOSIS — G89.29 CHRONIC PAIN OF BOTH KNEES: ICD-10-CM

## 2022-10-12 DIAGNOSIS — M25.562 CHRONIC PAIN OF BOTH KNEES: ICD-10-CM

## 2022-10-12 DIAGNOSIS — M25.561 CHRONIC PAIN OF BOTH KNEES: ICD-10-CM

## 2022-10-12 DIAGNOSIS — M17.0 PRIMARY OSTEOARTHRITIS OF BOTH KNEES: Primary | ICD-10-CM

## 2022-10-12 PROCEDURE — 97112 NEUROMUSCULAR REEDUCATION: CPT | Performed by: PHYSICAL THERAPIST

## 2022-10-12 PROCEDURE — 97110 THERAPEUTIC EXERCISES: CPT | Performed by: PHYSICAL THERAPIST

## 2022-10-12 NOTE — PROGRESS NOTES
Daily Note     Today's date: 10/12/2022  Patient name: Muriel Shaikh  : 1975  MRN: [id number]  Referring provider: Duane Gentry  Dx:   Encounter Diagnosis     ICD-10-CM    1  Primary osteoarthritis of both knees  M17 0    2  Chronic pain of both knees  M25 561     M25 562     G89 29                   Subjective: Pt reports her knees have been painful, but she has been completing her HEP at home  Objective: See treatment diary below      Assessment: Pt does well w progression of treatment session and is challenged appropriately  She does well w TM at low speed, and does well on leg press  She has some difficulty w SLR and slight pain w LAQs  Patient demonstrated fatigue post treatment and would benefit from continued PT  Plan: Continue per plan of care  Progress treatment as tolerated         Precautions: b/l knee OA    Date 10/6 10/12           Visit # 1 2           FOTO IE             Re-eval IE              Manuals 10/6 10/12           B/l knee PROM SF            PFJ Mobs SF Gr I                                      Neuro Re-Ed 10/6 10/12           Clams  15x GTB HL 2x15 BTB HL           Bridge 2x5 2x10           Rockerboard             SLS                                                    Ther Ex 10/6 10/12           TM walking  4'           Heel Slides HEP 20x5" SB w strap           Quad set  10x10"           SLR 2x5 10x ea           S/l hip ABD             Leg Press  4x10 75#           LAQ's  20x ea           Hamstring str  3x20" ea                        Ther Activity                                       Gait Training                                       Modalities

## 2022-10-13 ENCOUNTER — APPOINTMENT (OUTPATIENT)
Dept: PHYSICAL THERAPY | Facility: CLINIC | Age: 47
End: 2022-10-13

## 2022-10-14 ENCOUNTER — OFFICE VISIT (OUTPATIENT)
Dept: FAMILY MEDICINE CLINIC | Facility: CLINIC | Age: 47
End: 2022-10-14
Payer: COMMERCIAL

## 2022-10-14 VITALS — BODY MASS INDEX: 45.76 KG/M2 | WEIGHT: 275 LBS

## 2022-10-14 DIAGNOSIS — Z12.11 SCREEN FOR COLON CANCER: ICD-10-CM

## 2022-10-14 DIAGNOSIS — E66.01 MORBID OBESITY WITH BMI OF 40.0-44.9, ADULT (HCC): ICD-10-CM

## 2022-10-14 DIAGNOSIS — I67.1 BRAIN ANEURYSM: Primary | ICD-10-CM

## 2022-10-14 DIAGNOSIS — Z11.4 SCREENING FOR HIV (HUMAN IMMUNODEFICIENCY VIRUS): ICD-10-CM

## 2022-10-14 DIAGNOSIS — Z23 ENCOUNTER FOR IMMUNIZATION: ICD-10-CM

## 2022-10-14 DIAGNOSIS — Z12.4 SCREENING FOR CERVICAL CANCER: ICD-10-CM

## 2022-10-14 DIAGNOSIS — I10 BENIGN ESSENTIAL HYPERTENSION: ICD-10-CM

## 2022-10-14 DIAGNOSIS — Z11.59 NEED FOR HEPATITIS C SCREENING TEST: ICD-10-CM

## 2022-10-14 DIAGNOSIS — E55.9 VITAMIN D DEFICIENCY: ICD-10-CM

## 2022-10-14 PROCEDURE — 99214 OFFICE O/P EST MOD 30 MIN: CPT | Performed by: PHYSICIAN ASSISTANT

## 2022-10-14 PROCEDURE — 90471 IMMUNIZATION ADMIN: CPT | Performed by: PHYSICIAN ASSISTANT

## 2022-10-14 PROCEDURE — 90682 RIV4 VACC RECOMBINANT DNA IM: CPT | Performed by: PHYSICIAN ASSISTANT

## 2022-10-14 PROCEDURE — 93000 ELECTROCARDIOGRAM COMPLETE: CPT | Performed by: PHYSICIAN ASSISTANT

## 2022-10-14 RX ORDER — VERAPAMIL HYDROCHLORIDE 120 MG/1
120 CAPSULE, EXTENDED RELEASE ORAL DAILY
Qty: 90 CAPSULE | Refills: 3 | Status: SHIPPED | OUTPATIENT
Start: 2022-10-14

## 2022-10-14 RX ORDER — ACETAMINOPHEN 160 MG
2000 TABLET,DISINTEGRATING ORAL EVERY MORNING
Qty: 90 CAPSULE | Refills: 3 | Status: SHIPPED | OUTPATIENT
Start: 2022-10-14

## 2022-10-14 NOTE — PROGRESS NOTES
Name: Steven Silverman      : 1975      MRN: [id number]  Encounter Provider: Cb Serna PA-C  Encounter Date: 10/14/2022   Encounter department: [facility] PRIMARY CARE    Assessment & Plan     1  Brain aneurysm  -     CTA head w wo contrast; Future; Expected date: 10/14/2022  -     Ambulatory Referral to Neurosurgery; Future    2  Benign essential hypertension  -     CBC and differential  -     Comprehensive metabolic panel  -     Hemoglobin A1C  -     Lipid Panel with Direct LDL reflex  -     TSH, 3rd generation with Free T4 reflex  -     verapamil (VERELAN PM) 120 MG 24 hr capsule; Take 1 capsule (120 mg total) by mouth in the morning    3  Vitamin D deficiency  -     CBC and differential  -     Comprehensive metabolic panel  -     Hemoglobin A1C  -     Lipid Panel with Direct LDL reflex  -     TSH, 3rd generation with Free T4 reflex  -     Cholecalciferol (Vitamin D3) 50 MCG ( UT) capsule; Take 1 capsule (2,000 Units total) by mouth every morning    4  Need for hepatitis C screening test  -     Hepatitis C antibody; Future    5  Screening for HIV (human immunodeficiency virus)  -     HIV 1/2 Antigen/Antibody (4th Generation) w Reflex [facility]; Future    6  Screening for cervical cancer  -     Ambulatory referral to Obstetrics / Gynecology; Future    7  Morbid obesity with BMI of 40 0-44 9, adult ([facility] [address] )    8  Screen for colon cancer  -     Ambulatory referral for colonoscopy; Future         Subjective      HPI:  This is a 52-year-old female who presents to the office for follow-up of chronic health conditions and to discuss history of brain aneurysm  She has had prior services in the [facility] and was seeing neuro surgery there until she changed insurance and has to go with [facility]  She was supposed to have CT angiogram completed by  with and without contrast of the head  She also needs follow-up with a neurosurgeon within the network    She has had history of aneurysm with clipping procedure previously  She does have a history of hypertension which has been stable with losartan, verapamil, and chlorthalidone  She does need a refill of medication  She also is due for colon cancer screening and never received call after the test was ordered last time to set up appointment  She also states that she gets occasional chest discomfort and has not had any recent cardiac evaluation  Review of Systems   Constitutional: Negative for chills, fatigue and fever  HENT: Negative for congestion, ear pain and sinus pressure  Eyes: Negative for visual disturbance  Respiratory: Negative for cough, chest tightness and shortness of breath  Cardiovascular: Negative for chest pain and palpitations  Gastrointestinal: Negative for diarrhea, nausea and vomiting  Endocrine: Negative for polyuria  Genitourinary: Negative for dysuria and frequency  Musculoskeletal: Negative for arthralgias and myalgias  Skin: Negative for pallor and rash  Neurological: Negative for dizziness, weakness, light-headedness, numbness and headaches  Psychiatric/Behavioral: Negative for agitation, behavioral problems and sleep disturbance  All other systems reviewed and are negative        Current Outpatient Medications on File Prior to Visit   Medication Sig   • amitriptyline (ELAVIL) 10 mg tablet Take 20 mg by mouth in the morning   • chlorthalidone 25 mg tablet Take 12 5 mg by mouth in the morning   • diclofenac (VOLTAREN) 75 mg EC tablet Take 1 tablet (75 mg total) by mouth 2 (two) times a day as needed (knee pain)   • losartan (COZAAR) 100 MG tablet Take 1 tablet (100 mg total) by mouth in the morning   • omeprazole (PriLOSEC) 40 MG capsule Take 1 capsule (40 mg total) by mouth daily before breakfast   • [DISCONTINUED] Cholecalciferol (Vitamin D3) 50 MCG (2000 UT) capsule TAKE ONE CAPSULE BY MOUTH EVERY MORNING   • [DISCONTINUED] verapamil (VERELAN PM) 120 MG 24 hr capsule Take 1 capsule (120 mg total) by mouth in the morning       Objective     Wt 125 kg (275 lb)   LMP  (LMP Unknown)   BMI 45 76 kg/m²     Physical Exam  Vitals and nursing note reviewed  Constitutional:       General: She is not in acute distress  Appearance: She is well-developed  HENT:      Head: Normocephalic and atraumatic  Right Ear: External ear normal       Left Ear: External ear normal       Nose: Nose normal       Mouth/Throat:      Pharynx: No oropharyngeal exudate  Eyes:      Conjunctiva/sclera: Conjunctivae normal       Pupils: Pupils are equal, round, and reactive to light  Neck:      Thyroid: No thyromegaly  Trachea: No tracheal deviation  Cardiovascular:      Rate and Rhythm: Normal rate and regular rhythm  Heart sounds: Normal heart sounds  No murmur heard  No friction rub  Pulmonary:      Effort: Pulmonary effort is normal  No respiratory distress  Breath sounds: Normal breath sounds  No wheezing or rales  Abdominal:      General: Bowel sounds are normal  There is no distension  Palpations: Abdomen is soft  Tenderness: There is no abdominal tenderness  There is no guarding or rebound  Musculoskeletal:         General: No tenderness  Normal range of motion  Cervical back: Normal range of motion and neck supple  Lymphadenopathy:      Cervical: No cervical adenopathy  Skin:     General: Skin is warm and dry  Findings: No erythema or rash  Neurological:      Mental Status: She is alert and oriented to person, place, and time  Cranial Nerves: No cranial nerve deficit  Coordination: Coordination normal    Psychiatric:         Behavior: Behavior normal          Thought Content:  Thought content normal        aFm Rob PA-C

## 2022-10-14 NOTE — PATIENT INSTRUCTIONS
Assessment/plan:  Brain aneurysm-recommend follow-up CT angiogram of the head with and without contrast   Patient will then need follow-up with Neurosurgery  2  Benign essential hypertension -presently stable with losartan, verapamil, chlorthalidone  No medication changes  3  Chest discomfort -EKG was performed in the office  and within normal limits  If symptoms recur, recommend further evaluation the emergency room setting  4   Screening for colon cancer -colonoscopy ordered  5  Screening for cervical cancer -patient will be referred to Dr Lolis Vergara for ongoing gynecology care in the network  Flu vaccine given today

## 2022-10-18 ENCOUNTER — APPOINTMENT (OUTPATIENT)
Dept: PHYSICAL THERAPY | Facility: CLINIC | Age: 47
End: 2022-10-18

## 2022-10-19 ENCOUNTER — APPOINTMENT (OUTPATIENT)
Dept: LAB | Facility: HOSPITAL | Age: 47
End: 2022-10-19
Payer: COMMERCIAL

## 2022-10-19 DIAGNOSIS — Z11.59 NEED FOR HEPATITIS C SCREENING TEST: ICD-10-CM

## 2022-10-19 DIAGNOSIS — Z11.4 SCREENING FOR HIV (HUMAN IMMUNODEFICIENCY VIRUS): ICD-10-CM

## 2022-10-19 LAB
ALBUMIN SERPL BCP-MCNC: 3.5 G/DL (ref 3.5–5)
ALP SERPL-CCNC: 74 U/L (ref 46–116)
ALT SERPL W P-5'-P-CCNC: 19 U/L (ref 12–78)
ANION GAP SERPL CALCULATED.3IONS-SCNC: 9 MMOL/L (ref 4–13)
AST SERPL W P-5'-P-CCNC: 16 U/L (ref 5–45)
BASOPHILS # BLD AUTO: 0.03 THOUSANDS/ΜL (ref 0–0.1)
BASOPHILS NFR BLD AUTO: 0 % (ref 0–1)
BILIRUB SERPL-MCNC: 0.92 MG/DL (ref 0.2–1)
BUN SERPL-MCNC: 7 MG/DL (ref 5–25)
CALCIUM SERPL-MCNC: 8.9 MG/DL (ref 8.3–10.1)
CHLORIDE SERPL-SCNC: 99 MMOL/L (ref 96–108)
CHOLEST SERPL-MCNC: 198 MG/DL
CO2 SERPL-SCNC: 29 MMOL/L (ref 21–32)
CREAT SERPL-MCNC: 0.68 MG/DL (ref 0.6–1.3)
EOSINOPHIL # BLD AUTO: 0.15 THOUSAND/ΜL (ref 0–0.61)
EOSINOPHIL NFR BLD AUTO: 2 % (ref 0–6)
ERYTHROCYTE [DISTWIDTH] IN BLOOD BY AUTOMATED COUNT: 16.4 % (ref 11.6–15.1)
EST. AVERAGE GLUCOSE BLD GHB EST-MCNC: 143 MG/DL
GFR SERPL CREATININE-BSD FRML MDRD: 105 ML/MIN/1.73SQ M
GLUCOSE P FAST SERPL-MCNC: 91 MG/DL (ref 65–99)
HBA1C MFR BLD: 6.6 %
HCT VFR BLD AUTO: 39.4 % (ref 34.8–46.1)
HCV AB SER QL: NORMAL
HDLC SERPL-MCNC: 56 MG/DL
HGB BLD-MCNC: 12.1 G/DL (ref 11.5–15.4)
IMM GRANULOCYTES # BLD AUTO: 0.02 THOUSAND/UL (ref 0–0.2)
IMM GRANULOCYTES NFR BLD AUTO: 0 % (ref 0–2)
LDLC SERPL CALC-MCNC: 128 MG/DL (ref 0–100)
LYMPHOCYTES # BLD AUTO: 2.46 THOUSANDS/ΜL (ref 0.6–4.47)
LYMPHOCYTES NFR BLD AUTO: 30 % (ref 14–44)
MCH RBC QN AUTO: 23.3 PG (ref 26.8–34.3)
MCHC RBC AUTO-ENTMCNC: 30.7 G/DL (ref 31.4–37.4)
MCV RBC AUTO: 76 FL (ref 82–98)
MONOCYTES # BLD AUTO: 0.67 THOUSAND/ΜL (ref 0.17–1.22)
MONOCYTES NFR BLD AUTO: 8 % (ref 4–12)
NEUTROPHILS # BLD AUTO: 4.88 THOUSANDS/ΜL (ref 1.85–7.62)
NEUTS SEG NFR BLD AUTO: 60 % (ref 43–75)
NRBC BLD AUTO-RTO: 0 /100 WBCS
PLATELET # BLD AUTO: 333 THOUSANDS/UL (ref 149–390)
PMV BLD AUTO: 10.6 FL (ref 8.9–12.7)
POTASSIUM SERPL-SCNC: 2.8 MMOL/L (ref 3.5–5.3)
PROT SERPL-MCNC: 8.1 G/DL (ref 6.4–8.4)
RBC # BLD AUTO: 5.2 MILLION/UL (ref 3.81–5.12)
SODIUM SERPL-SCNC: 137 MMOL/L (ref 135–147)
TRIGL SERPL-MCNC: 70 MG/DL
TSH SERPL DL<=0.05 MIU/L-ACNC: 1.72 UIU/ML (ref 0.45–4.5)
WBC # BLD AUTO: 8.21 THOUSAND/UL (ref 4.31–10.16)

## 2022-10-19 PROCEDURE — 80061 LIPID PANEL: CPT | Performed by: PHYSICIAN ASSISTANT

## 2022-10-19 PROCEDURE — 83036 HEMOGLOBIN GLYCOSYLATED A1C: CPT | Performed by: PHYSICIAN ASSISTANT

## 2022-10-19 PROCEDURE — 86803 HEPATITIS C AB TEST: CPT

## 2022-10-19 PROCEDURE — 84443 ASSAY THYROID STIM HORMONE: CPT | Performed by: PHYSICIAN ASSISTANT

## 2022-10-19 PROCEDURE — 85025 COMPLETE CBC W/AUTO DIFF WBC: CPT | Performed by: PHYSICIAN ASSISTANT

## 2022-10-19 PROCEDURE — 80053 COMPREHEN METABOLIC PANEL: CPT | Performed by: PHYSICIAN ASSISTANT

## 2022-10-19 PROCEDURE — 36415 COLL VENOUS BLD VENIPUNCTURE: CPT | Performed by: PHYSICIAN ASSISTANT

## 2022-10-19 PROCEDURE — 87389 HIV-1 AG W/HIV-1&-2 AB AG IA: CPT

## 2022-10-20 ENCOUNTER — APPOINTMENT (OUTPATIENT)
Dept: PHYSICAL THERAPY | Facility: CLINIC | Age: 47
End: 2022-10-20

## 2022-10-20 ENCOUNTER — TELEPHONE (OUTPATIENT)
Dept: FAMILY MEDICINE CLINIC | Facility: CLINIC | Age: 47
End: 2022-10-20

## 2022-10-20 LAB — HIV 1+2 AB+HIV1 P24 AG SERPL QL IA: NORMAL

## 2022-10-20 NOTE — TELEPHONE ENCOUNTER
Patient called in because she has an appointment tomorrow with Rachel Coulter and she stated she doesn't have money to pay the copay, I did tell her that we could bill it for her, but she stated she did that for her last visit and she doesn't want to keep getting billed, patient would like to know if she needs to come in for this visit? Please give patient a call back thank you!

## 2022-10-20 NOTE — TELEPHONE ENCOUNTER
Please advise if patient can put off her Follow up visit and if so when would you prefer she return to office?

## 2022-10-20 NOTE — TELEPHONE ENCOUNTER
I made patient aware of the new DM diagnosis and advised she avoid carbs, sugars etc and we will see her in 3 months per [facility]  Office visit made

## 2022-10-25 ENCOUNTER — HOSPITAL ENCOUNTER (OUTPATIENT)
Dept: CT IMAGING | Facility: HOSPITAL | Age: 47
Discharge: HOME | End: 2022-10-25
Payer: COMMERCIAL

## 2022-10-25 DIAGNOSIS — I67.1 BRAIN ANEURYSM: ICD-10-CM

## 2022-10-25 PROCEDURE — 70496 CT ANGIOGRAPHY HEAD: CPT

## 2022-10-25 PROCEDURE — G1004 CDSM NDSC: HCPCS

## 2022-10-25 RX ADMIN — IOHEXOL 100 ML: 350 INJECTION, SOLUTION INTRAVENOUS at 08:05

## 2022-10-27 ENCOUNTER — APPOINTMENT (OUTPATIENT)
Dept: PHYSICAL THERAPY | Facility: CLINIC | Age: 47
End: 2022-10-27

## 2022-11-29 DIAGNOSIS — I10 BENIGN ESSENTIAL HYPERTENSION: Primary | ICD-10-CM

## 2022-11-29 DIAGNOSIS — G47.00 INSOMNIA, UNSPECIFIED TYPE: ICD-10-CM

## 2022-11-29 PROBLEM — I67.1 CEREBRAL ANEURYSM, NONRUPTURED: Status: ACTIVE | Noted: 2022-11-29

## 2022-11-29 RX ORDER — AMITRIPTYLINE HYDROCHLORIDE 10 MG/1
20 TABLET, FILM COATED ORAL DAILY
Qty: 180 TABLET | Refills: 1 | Status: SHIPPED | OUTPATIENT
Start: 2022-11-29

## 2022-11-29 RX ORDER — CHLORTHALIDONE 25 MG/1
12.5 TABLET ORAL DAILY
Qty: 180 TABLET | Refills: 1 | Status: SHIPPED | OUTPATIENT
Start: 2022-11-29

## 2022-11-29 NOTE — TELEPHONE ENCOUNTER
Medication: amitriptyline 10mg   Day supply: 90  [facility]     Last office visit: 10/29/2022    Upcoming office visit: 1/13/2023        Medication: chlorthalidone 25mg   Day supply: 90  [facility]     Last office visit: 10/29/2022    Upcoming office visit: 1/13/2023

## 2022-11-29 NOTE — ASSESSMENT & PLAN NOTE
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  · No smoking history  · Reports sister with cerebral aneurysm who does not wish to have surgery/intervention  · Exam is non-focal      Imaging:  · CTA head w/wo, 10/25/2022: Status post clipping of left MCA aneurysm  Venous contamination limits evaluation for residual/recurrent aneurysm  Suggest further evaluation with MRA and comparison with prior outside imaging  No intracranial stenosis  Plan:  · Reviewed CTA imaging extensively with patient  · She has MRA head scheduled for next Wednesday - will see patient in office again as joint appointment once MRA is completed  · Follow up as scheduled

## 2022-11-30 ENCOUNTER — OFFICE VISIT (OUTPATIENT)
Dept: NEUROSURGERY | Facility: CLINIC | Age: 47
End: 2022-11-30

## 2022-11-30 VITALS
BODY MASS INDEX: 44.95 KG/M2 | HEIGHT: 65 IN | SYSTOLIC BLOOD PRESSURE: 145 MMHG | DIASTOLIC BLOOD PRESSURE: 91 MMHG | WEIGHT: 269.8 LBS | TEMPERATURE: 97.9 F | HEART RATE: 90 BPM | RESPIRATION RATE: 16 BRPM

## 2022-11-30 DIAGNOSIS — I67.1 CEREBRAL ANEURYSM, NONRUPTURED: Primary | ICD-10-CM

## 2022-11-30 DIAGNOSIS — I67.1 BRAIN ANEURYSM: ICD-10-CM

## 2022-11-30 NOTE — PROGRESS NOTES
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  · No smoking history  · Reports sister with cerebral aneurysm who does not wish to have surgery/intervention  · Exam is non-focal      Imaging:  · CTA head w/wo, 10/25/2022: Status post clipping of left MCA aneurysm  Venous contamination limits evaluation for residual/recurrent aneurysm  Suggest further evaluation with MRA and comparison with prior outside imaging  No intracranial stenosis  Plan:  · Reviewed CTA imaging extensively with patient  · She has MRA head scheduled for next Wednesday - will see patient in office again as joint appointment once MRA is completed  · Follow up as scheduled  Diagnoses and all orders for this visit:    Cerebral aneurysm, nonruptured    Brain aneurysm  -     Ambulatory Referral to Neurosurgery          I spent 30 minutes with the patient today in which >50% of the time was spent counseling/coordination of care regarding diagnosis, imaging review, symptoms and treatment plan  CHIEF COMPLAINT    Chief Complaint   Patient presents with   • Consult     Ree Thurston/Abi - Consult brain aneurysm w/CTA head wwo 10 25 22 SL; Pt  had previous brain sx at [facility] in 2018;  Followed at [facility] until ins  change  MRA head wwo sched  12 7 22       HISTORY    History of Present Illness     56y o  year old female     With past medical history of cerebral aneurysm, status post clipping in 2018 at [facility], hypertension, vitamin-D deficiency, obesity, who presents for evaluation and surveillance of cerebral aneurysm  She states the aneurysm was discovered incidentally during workup for headaches  She elected to have clipping done to secure aneurysm and never suffered from any subarachnoid hemorrhage  She states that her sister has a known cerebral aneurysm as well but does not wish to have any treatment  No other family members with history of aneurysm or sudden death at a young age  She has never smoked  She has hypertension that is controlled on 3 different medications  Today she describes that she will get an occasional headache  She also relates that she will experience tenderness and numbness to the left side of her head at the site of previous craniotomy  She is also complaining of shoulder pain today and knee pain  She states she is trying to lose weight but is struggling with this  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  She lives at home alone  She does not have any children  See Discussion    REVIEW OF SYSTEMS    Review of Systems   Constitutional: Negative  HENT: Negative  Eyes: Negative  Respiratory: Negative  Cardiovascular: Negative  Gastrointestinal: Negative  Endocrine: Negative  Genitourinary: Negative  Musculoskeletal: Negative  Skin: Negative  Allergic/Immunologic: Negative  Neurological: Positive for headaches (mild)  Hematological: Negative  Psychiatric/Behavioral: Negative  All other systems reviewed and are negative  ROS reviewed and edited as needed      Meds/Allergies     Current Outpatient Medications   Medication Sig Dispense Refill   • amitriptyline (ELAVIL) 10 mg tablet Take 2 tablets (20 mg total) by mouth in the morning 180 tablet 1   • chlorthalidone 25 mg tablet Take 0 5 tablets (12 5 mg total) by mouth in the morning 180 tablet 1   • Cholecalciferol (Vitamin D3) 50 MCG (2000 UT) capsule Take 1 capsule (2,000 Units total) by mouth every morning 90 capsule 3   • diclofenac (VOLTAREN) 75 mg EC tablet Take 1 tablet (75 mg total) by mouth 2 (two) times a day as needed (knee pain) 60 tablet 1   • losartan (COZAAR) 100 MG tablet Take 1 tablet (100 mg total) by mouth in the morning 90 tablet 0   • omeprazole (PriLOSEC) 40 MG capsule Take 1 capsule (40 mg total) by mouth daily before breakfast 30 capsule 0   • verapamil (VERELAN PM) 120 MG 24 hr capsule Take 1 capsule (120 mg total) by mouth in the morning 90 capsule 3     No current facility-administered medications for this visit  No Known Allergies    PAST HISTORY    Past Medical History:   Diagnosis Date   • Aneurysm [facility])        Past Surgical History:   Procedure Laterality Date   • BREAST CYST EXCISION Right 2021   • HYSTERECTOMY         Social History     Tobacco Use   • Smoking status: Never   • Smokeless tobacco: Never   Substance Use Topics   • Alcohol use: Never   • Drug use: Never       Family History   Problem Relation Age of Onset   • No Known Problems Mother    • No Known Problems Father    • No Known Problems Sister    • No Known Problems Daughter    • No Known Problems Maternal Grandmother    • No Known Problems Maternal Grandfather    • No Known Problems Paternal Grandmother    • No Known Problems Paternal Grandfather    • Breast cancer Maternal Aunt          Above history personally reviewed  EXAM    Vitals:Blood pressure 145/91, pulse 90, temperature 97 9 °F (36 6 °C), temperature source Temporal, resp  rate 16, height 5' 5" (1 651 m), weight 122 kg (269 lb 12 8 oz)  ,Body mass index is 44 9 kg/m²  Physical Exam  Constitutional:       Appearance: She is well-developed and well-nourished  HENT:      Head: Normocephalic and atraumatic  Eyes:      Extraocular Movements: EOM normal       Pupils: Pupils are equal, round, and reactive to light  Pulmonary:      Effort: Pulmonary effort is normal    Abdominal:      Palpations: Abdomen is soft     Musculoskeletal: General: Normal range of motion  Cervical back: Normal range of motion and neck supple  Skin:     General: Skin is warm and dry  Neurological:      General: No focal deficit present  Mental Status: She is alert and oriented to person, place, and time  Mental status is at baseline  Cranial Nerves: Cranial nerves 2-12 are intact  No cranial nerve deficit  Sensory: No sensory deficit  Motor: No weakness  Coordination: Coordination normal  Finger-Nose-Finger Test normal    Psychiatric:         Speech: Speech normal          Neurologic Exam     Mental Status   Oriented to person, place, and time  Oriented to person  Oriented to place  Oriented to time  Oriented to year, month and date  Registration: recalls 3 of 3 objects  Attention: normal  Concentration: normal    Speech: speech is normal   Level of consciousness: alert  Knowledge: good and consistent with education  Able to name object  Cranial Nerves   Cranial nerves II through XII intact  CN III, IV, VI   Pupils are equal, round, and reactive to light  Extraocular motions are normal    Right pupil: Size: 3 mm  Shape: regular  Reactivity: brisk  Consensual response: intact  Accommodation: intact  Left pupil: Size: 3 mm  Shape: regular  Reactivity: brisk  Consensual response: intact  Accommodation: intact  Nystagmus: none   Diplopia: none  Conjugate gaze: present    CN V   Right facial sensation deficit: none  Left facial sensation deficit: none    CN VII   Facial expression full, symmetric       CN VIII   Hearing: intact    CN IX, X   Palate: symmetric    CN XI   Right sternocleidomastoid strength: normal  Left sternocleidomastoid strength: normal  Right trapezius strength: normal  Left trapezius strength: normal    CN XII   Tongue: not atrophic  Fasciculations: absent  Tongue deviation: none    Motor Exam   Muscle bulk: normal  Overall muscle tone: normal  Right arm pronator drift: absent  Left arm pronator drift: absent    Strength   Right deltoid: 5/5  Left deltoid: 5/5  Right biceps: 5/5  Left biceps: 5/5  Right triceps: 5/5  Left triceps: 5/5  Right quadriceps: 5/5  Left quadriceps: 5/5  Right hamstrin/5  Left hamstrin/5  Right anterior tibial: 5/5  Left anterior tibial: 5/5  Right posterior tibial: 5/5  Left posterior tibial: 5/5  Right peroneal: 5/5  Left peroneal: 5/5  Right gastroc: 5/5  Left gastroc: 5/5    Sensory Exam   Light touch normal    Proprioception normal      Gait, Coordination, and Reflexes     Coordination   Finger to nose coordination: normal    Tremor   Resting tremor: absent  Intention tremor: absent  Action tremor: absent        MEDICAL DECISION MAKING    Imaging Studies:     No results found  I have personally reviewed pertinent reports     and I have personally reviewed pertinent films in PACS

## 2022-11-30 NOTE — PROGRESS NOTES
Assessment/Plan:    Normal findings on routine gyn exam  Advised monthly SBE, annual CBE and continued annual mammography  Reviewed ASCCP guidelines and pt understands paps are not necessary after hyst    Diet/activity recommendations - Calcium 1200 mg daily and Vit D 600-100 IU daily  RTO in one year or sooner PRN  Diagnoses and all orders for this visit:    Encounter for gynecological examination (general) (routine) without abnormal findings    Screening mammogram for breast cancer  -     Mammo screening bilateral w 3d & cad; Future    Colon cancer screening  -     Ambulatory Referral to Gastroenterology; Future        Subjective:      Patient ID: Yahaira Giron is a 56 y o  female  This new patient presents for routine annual gyn exam  She is from the [facility] in 2020 for bleeding  She reports "bumps" on vulva for years, not irritating to painful  She denies pelvic pain, breast concerns, abnormal discharge, bowel/bladder dysfunction, depression/anxiety  Last pap years ago, denies hx of abnormal paps  Mammo normal 9/2022  , no sexually active  She denies STI concerns  The following portions of the patient's history were reviewed and updated as appropriate: allergies, current medications, past family history, past medical history, past social history, past surgical history and problem list     Review of Systems   Constitutional: Negative  HENT: Negative  Respiratory: Negative  Cardiovascular: Negative  Gastrointestinal: Negative  Endocrine: Negative  Genitourinary: Negative for difficulty urinating, dysuria, frequency, menstrual problem, pelvic pain, urgency, vaginal bleeding, vaginal discharge and vaginal pain  Musculoskeletal: Negative  Skin: Negative  Neurological: Negative  Psychiatric/Behavioral: Negative            Objective:      /88 (BP Location: Left arm, Patient Position: Sitting, Cuff Size: Large)   Pulse 91   Ht 5' 4 5" (1 638 m)   Wt 123 kg (272 lb)   LMP  (LMP Unknown)   BMI 45 97 kg/m²          Physical Exam  Vitals and nursing note reviewed  Exam conducted with a chaperone present  Constitutional:       Appearance: Normal appearance  She is well-developed  HENT:      Head: Normocephalic and atraumatic  Neck:      Thyroid: No thyroid mass or thyromegaly  Cardiovascular:      Rate and Rhythm: Normal rate and regular rhythm  Heart sounds: Normal heart sounds  Pulmonary:      Effort: Pulmonary effort is normal       Breath sounds: Normal breath sounds  Chest:   Breasts:     Breasts are symmetrical       Right: No inverted nipple, mass, nipple discharge, skin change or tenderness  Left: No inverted nipple, mass, nipple discharge, skin change or tenderness  Abdominal:      General: Bowel sounds are normal       Palpations: Abdomen is soft  Tenderness: There is no abdominal tenderness  Hernia: There is no hernia in the left inguinal area or right inguinal area  Genitourinary:     General: Normal vulva  Exam position: Supine  Pubic Area: No rash  Labia:         Right: No rash, tenderness, lesion or injury  Left: No rash, tenderness, lesion or injury  Urethra: No prolapse, urethral pain, urethral swelling or urethral lesion  Vagina: Normal  No signs of injury and foreign body  No vaginal discharge, erythema, tenderness, bleeding, lesions or prolapsed vaginal walls  Adnexa:         Right: No mass, tenderness or fullness  Left: No mass, tenderness or fullness  Rectum: No external hemorrhoid  Comments: Urethra normal without lesions  No bladder tenderness  Cervox, uterus absent, no masses or tenderness on BME  Exam limited by body habitus  Musculoskeletal:         General: Normal range of motion  Cervical back: Normal range of motion and neck supple  Lymphadenopathy:      Lower Body: No right inguinal adenopathy   No left inguinal adenopathy  Skin:     General: Skin is warm and dry  Neurological:      Mental Status: She is alert and oriented to person, place, and time  Psychiatric:         Speech: Speech normal          Behavior: Behavior normal  Behavior is cooperative

## 2022-12-01 ENCOUNTER — ANNUAL EXAM (OUTPATIENT)
Dept: OBGYN CLINIC | Facility: CLINIC | Age: 47
End: 2022-12-01

## 2022-12-01 VITALS
WEIGHT: 272 LBS | SYSTOLIC BLOOD PRESSURE: 142 MMHG | HEART RATE: 91 BPM | DIASTOLIC BLOOD PRESSURE: 88 MMHG | BODY MASS INDEX: 45.32 KG/M2 | HEIGHT: 65 IN

## 2022-12-01 DIAGNOSIS — Z12.4 SCREENING FOR CERVICAL CANCER: ICD-10-CM

## 2022-12-01 DIAGNOSIS — Z12.11 COLON CANCER SCREENING: ICD-10-CM

## 2022-12-01 DIAGNOSIS — Z12.31 SCREENING MAMMOGRAM FOR BREAST CANCER: ICD-10-CM

## 2022-12-01 DIAGNOSIS — Z01.419 ENCOUNTER FOR GYNECOLOGICAL EXAMINATION (GENERAL) (ROUTINE) WITHOUT ABNORMAL FINDINGS: Primary | ICD-10-CM

## 2022-12-07 ENCOUNTER — HOSPITAL ENCOUNTER (OUTPATIENT)
Dept: MRI IMAGING | Facility: HOSPITAL | Age: 47
Discharge: HOME | End: 2022-12-07

## 2022-12-07 DIAGNOSIS — R90.89 ABNORMAL CT OF BRAIN: ICD-10-CM

## 2022-12-07 DIAGNOSIS — I67.1 BRAIN ANEURYSM: ICD-10-CM

## 2022-12-07 RX ADMIN — GADOBUTROL 12 ML: 604.72 INJECTION INTRAVENOUS at 21:05

## 2022-12-08 ENCOUNTER — OFFICE VISIT (OUTPATIENT)
Dept: GASTROENTEROLOGY | Facility: MEDICAL CENTER | Age: 47
End: 2022-12-08

## 2022-12-08 ENCOUNTER — TELEPHONE (OUTPATIENT)
Dept: NEUROLOGY | Facility: CLINIC | Age: 47
End: 2022-12-08

## 2022-12-08 VITALS
TEMPERATURE: 96.4 F | HEART RATE: 93 BPM | BODY MASS INDEX: 44.75 KG/M2 | SYSTOLIC BLOOD PRESSURE: 125 MMHG | DIASTOLIC BLOOD PRESSURE: 82 MMHG | WEIGHT: 264.8 LBS

## 2022-12-08 DIAGNOSIS — Z12.11 SCREENING FOR COLON CANCER: Primary | ICD-10-CM

## 2022-12-08 DIAGNOSIS — K21.9 GASTROESOPHAGEAL REFLUX DISEASE WITHOUT ESOPHAGITIS: ICD-10-CM

## 2022-12-08 DIAGNOSIS — K59.00 CONSTIPATION, UNSPECIFIED CONSTIPATION TYPE: ICD-10-CM

## 2022-12-08 RX ORDER — OMEPRAZOLE 40 MG/1
40 CAPSULE, DELAYED RELEASE ORAL
Qty: 30 CAPSULE | Refills: 0 | Status: SHIPPED | OUTPATIENT
Start: 2022-12-08 | End: 2022-12-08

## 2022-12-08 RX ORDER — POLYETHYLENE GLYCOL 3350 17 G/17G
17 POWDER, FOR SOLUTION ORAL DAILY
Qty: 255 G | Refills: 0 | Status: SHIPPED | OUTPATIENT
Start: 2022-12-08

## 2022-12-08 RX ORDER — OMEPRAZOLE 40 MG/1
40 CAPSULE, DELAYED RELEASE ORAL
Qty: 30 CAPSULE | Refills: 3 | Status: SHIPPED | OUTPATIENT
Start: 2022-12-08

## 2022-12-08 NOTE — TELEPHONE ENCOUNTER
Call complete to pt offering earlier appt time off the waitlist on 12/8 at 1 PM  Pt unable to make it to earlier appt   Stated that we would continue to keep the pt appt and keep them on the waitlist and call them if anything else opens up

## 2022-12-08 NOTE — PROGRESS NOTES
[facility] - Outpatient Consultation  Ana Cristina Kelsey 56 y o  female MRN: [id number]  Encounter: [id number]          ASSESSMENT AND PLAN:  52-year-old female with history of hypertension, MCA cerebral aneurysm status post clipping in 2018, migraine headaches who presents for evaluation  1  Gastroesophageal reflux disease without esophagitis  She reports chronic symptoms of gastroesophageal reflux  I discussed dietary/lifestyle antireflux measures with her today  She was previously taking omeprazole 40 mg daily but discontinue the medication  Recommend that she resume this for an 8-week PPI trial   EGD will be performed for evaluation given her symptoms, for screening for Monsalve's esophagus and to obtain gastric biopsies   - omeprazole (PriLOSEC) 40 MG capsule; Take 1 capsule (40 mg total) by mouth daily before breakfast  Dispense: 30 capsule; Refill: 3  - EGD; Future    2  Constipation, unspecified constipation type  She has history of intermittent constipation  I discussed constipation management with her today including adequate hydration, high-fiber diet, fiber supplementation  Recommend she start MiraLAX once daily and increase or decrease as needed to have a soft, formed bowel movement per day  - polyethylene glycol (GLYCOLAX) 17 GM/SCOOP powder; Take 17 g by mouth daily  Dispense: 255 g; Refill: 0    3  Screening for colon cancer  She has no prior colonoscopy and is due at this time given her age greater than 40  I discussed the indication, risk and benefit of colonoscopy for colon cancer screening with her today and she is agreeable to proceed  - Colonoscopy; Future      ______________________________________________________________________    HPI: 52-year-old female with history of hypertension, MCA cerebral aneurysm status post clipping in 2018, migraine headaches who presents for evaluation  Reports intermittent issues with constipation    She usually has a bowel movement daily  This can be associated with straining, hard and difficult to pass stools  At times she can see small amounts of bright red blood per rectum with wiping  She denies overt hematochezia  She has intermittent left lower quadrant abdominal pain which self resolves  She reports history of intermittent GERD symptoms with substernal burning, regurgitation  She was previously taking omeprazole a few months ago for this but has discontinued  She has no family history of gastrointestinal disease including colorectal cancer  Her past surgical history includes hysterectomy  She has no prior EGD or colonoscopy  She takes antiplatelet or anticoagulant medications        She has no abdominal imaging available for review  October 2022 liver enzymes are within normal limits and hemoglobin is 12 1        REVIEW OF SYSTEMS:    CONSTITUTIONAL: Denies any fever, chills, rigors, and weight loss  HEENT: No earache or tinnitus  Denies hearing loss or visual disturbances  CARDIOVASCULAR: No chest pain or palpitations  RESPIRATORY: Denies any cough, hemoptysis, shortness of breath or dyspnea on exertion  GASTROINTESTINAL: As noted in the History of Present Illness  GENITOURINARY: No problems with urination  Denies any hematuria or dysuria  NEUROLOGIC: No dizziness or vertigo, denies headaches  MUSCULOSKELETAL: Denies any muscle or joint pain  SKIN: Denies skin rashes or itching  ENDOCRINE: Denies excessive thirst  Denies intolerance to heat or cold  PSYCHOSOCIAL: Denies depression or anxiety  Denies any recent memory loss         Historical Information   Past Medical History:   Diagnosis Date   • Anemia    • Aneurysm ([address] )    • Hypertension    • Migraine      Past Surgical History:   Procedure Laterality Date   • BREAST CYST EXCISION Right 2021   • HYSTERECTOMY       Social History   Social History     Substance and Sexual Activity   Alcohol Use Never     Social History     Substance and Sexual Activity   Drug Use Never     Social History     Tobacco Use   Smoking Status Never   Smokeless Tobacco Never     Family History   Problem Relation Age of Onset   • Migraines Sister    • No Known Problems Maternal Grandmother    • No Known Problems Maternal Grandfather    • No Known Problems Paternal Grandmother    • No Known Problems Paternal Grandfather    • Breast cancer Maternal Aunt        Meds/Allergies       Current Outpatient Medications:   •  amitriptyline (ELAVIL) 10 mg tablet  •  chlorthalidone 25 mg tablet  •  Cholecalciferol (Vitamin D3) 50 MCG (2000 UT) capsule  •  diclofenac (VOLTAREN) 75 mg EC tablet  •  losartan (COZAAR) 100 MG tablet  •  omeprazole (PriLOSEC) 40 MG capsule  •  verapamil (VERELAN PM) 120 MG 24 hr capsule  No current facility-administered medications for this visit  No Known Allergies        Objective     Blood pressure 125/82, pulse 93, temperature (!) 96 4 °F (35 8 °C), temperature source Tympanic, weight 120 kg (264 lb 12 8 oz)  Body mass index is 44 75 kg/m²  PHYSICAL EXAM:      General Appearance:   Alert, cooperative, no distress   HEENT:   Normocephalic, atraumatic, anicteric  Neck:  Supple, symmetrical, trachea midline   Lungs:   Clear to auscultation bilaterally; no rales, rhonchi or wheezing; respirations unlabored    Heart[de-identified]   Regular rate and rhythm; no murmur, rub, or gallop  Abdomen:   Soft, non-tender, non-distended; normal bowel sounds; no masses, no organomegaly    Genitalia:   Deferred    Rectal:   Deferred    Extremities:  No cyanosis, clubbing or edema    Pulses:  2+ and symmetric    Skin:  No jaundice, rashes, or lesions    Lymph nodes:  No palpable cervical lymphadenopathy        Lab Results:   No visits with results within 1 Day(s) from this visit     Latest known visit with results is:   Appointment on 10/19/2022   Component Date Value   • Hepatitis C Ab 10/19/2022 Non-reactive    • HIV-1/HIV-2 Ab 10/19/2022 Non-Reactive          Radiology Results:   No results found

## 2022-12-08 NOTE — PATIENT INSTRUCTIONS
GERD (Gastroesophageal Reflux Disease)   AMBULATORY CARE:   Gastroesophageal reflux disease (GERD)  is reflux that happens more than 2 times a week for a few weeks  Reflux means acid and food in your stomach back up into your esophagus  GERD can cause other health problems over time if it is not treated  Common causes of GERD:  GERD often happens because the lower muscle (sphincter) of the esophagus does not close properly  The sphincter normally opens to let food into the stomach  It then closes to keep food and stomach acid in the stomach  If the sphincter does not close properly, stomach acid and food back up (reflux) into the esophagus  The following may increase your risk for GERD:  Certain foods such as spicy foods, chocolate, foods that contain caffeine, peppermint, and fried foods    Hiatal hernia    Certain medicines such as calcium channel blockers (used to treat high blood pressure), allergy medicines, sedatives, or antidepressants    Pregnancy, obesity, or scleroderma    Lying down after a meal    Drinking alcohol or smoking cigarettes    Signs and symptoms:   Heartburn (burning pain in your chest)    Pain after meals that spreads to your neck, jaw, or shoulder    Pain that gets better when you change positions    Bitter or acid taste in your mouth    A dry cough    Trouble swallowing or pain with swallowing    Hoarseness or a sore throat    Burping or hiccups    Feeling full soon after you start eating    Call your local emergency number (911 in the [address] [facility]) if:   You have severe chest pain and sudden trouble breathing  Seek care immediately if:   You have trouble breathing after you vomit  You have trouble swallowing, or pain with swallowing  Your bowel movements are black, bloody, or tarry-looking  Your vomit looks like coffee grounds or has blood in it  Call your doctor or gastroenterologist if:   You feel full and cannot burp or vomit      You vomit large amounts, or you vomit often     You are losing weight without trying  Your symptoms get worse or do not improve with treatment  You have questions or concerns about your condition or care  Treatment for GERD:   Medicines  are used to decrease stomach acid  Medicine may also be used to help your lower esophageal sphincter and stomach contract (tighten) more  Surgery  is done to wrap the upper part of the stomach around the esophageal sphincter  This will strengthen the sphincter and prevent reflux  Manage GERD:       Do not have foods or drinks that may increase heartburn  These include chocolate, peppermint, fried or fatty foods, drinks that contain caffeine, or carbonated drinks (soda)  Other foods include spicy foods, onions, tomatoes, and tomato-based foods  Do not have foods or drinks that can irritate your esophagus, such as citrus fruits, juices, and alcohol  Do not eat large meals  When you eat a lot of food at one time, your stomach needs more acid to digest it  Eat 6 small meals each day instead of 3 large meals, and eat slowly  Do not eat meals 2 to 3 hours before bedtime  Elevate the head of your bed  Place 6-inch blocks under the head of your bed frame  You may also use more than one pillow under your head and shoulders while you sleep  Maintain a healthy weight  If you are overweight, weight loss may help relieve symptoms of GERD  Do not smoke  Smoking weakens the lower esophageal sphincter and increases the risk of GERD  Ask your healthcare provider for information if you currently smoke and need help to quit  E-cigarettes or smokeless tobacco still contain nicotine  Talk to your healthcare provider before you use these products  Do not put pressure on your abdomen  Pressure pushes acid up into your esophagus  Do not wear clothing that is tight around your waist  Do not bend over  Bend at the knees if you need to pick something up      Follow up with your doctor or gastroenterologist as directed:  Write down your questions so you remember to ask them during your visits  © Copyright [employer]  Information is for End User's use only and may not be sold, redistributed or otherwise used for commercial purposes  All illustrations and images included in CareNotes® are the copyrighted property of A D A M , Inc  or Melissa Marvin  The above information is an  only  It is not intended as medical advice for individual conditions or treatments  Talk to your doctor, nurse or pharmacist before following any medical regimen to see if it is safe and effective for you  High Fiber Diet   AMBULATORY CARE:   A high-fiber diet  includes foods that have a high amount of fiber  Fiber is the part of fruits, vegetables, and grains that is not broken down by your body  Fiber keeps your bowel movements regular  Fiber can also help lower your cholesterol level, control blood sugar in people with diabetes, and relieve constipation  Fiber can also help you control your weight because it helps you feel full faster  Most adults should eat 25 to 35 grams of fiber each day  Talk to your dietitian or healthcare provider about the amount of fiber you need    Good sources of fiber:       Foods with at least 4 grams of fiber per serving:      ? to ½ cup of high-fiber cereal (check the nutrition label on the box)    ½ cup of blackberries or raspberries    4 dried prunes    1 cooked artichoke    ½ cup of cooked legumes, such as lentils, or red, kidney, and barfield beans    Foods with 1 to 3 grams of fiber per servin slice of whole-wheat, pumpernickel, or rye bread    ½ cup of cooked brown rice    4 whole-wheat crackers    1 cup of oatmeal    ½ cup of cereal with 1 to 3 grams of fiber per serving (check the nutrition label on the box)    1 small piece of fruit, such as an apple, banana, pear, kiwi, or orange    3 dates    ½ cup of canned apricots, fruit cocktail, peaches, or pears    ½ cup of raw or cooked vegetables, such as carrots, cauliflower, cabbage, spinach, squash, or corn    Ways that you can increase fiber in your diet:   Choose brown or wild rice instead of white rice  Use whole wheat flour in recipes instead of white or all-purpose flour  Add beans and peas to casseroles or soups  Choose fresh fruit and vegetables with peels or skins on instead of juices  Other diet guidelines to follow: Add fiber to your diet slowly  You may have abdominal discomfort, bloating, and gas if you add fiber to your diet too quickly  Drink plenty of liquids as you add fiber to your diet  You may have nausea or develop constipation if you do not drink enough water  Ask how much liquid to drink each day and which liquids are best for you  © Copyright [employer] 2022 Information is for End User's use only and may not be sold, redistributed or otherwise used for commercial purposes  All illustrations and images included in CareNotes® are the copyrighted property of A D A M , Inc  or [address] [place], [place] [address]gabino abiola   The above information is an  only  It is not intended as medical advice for individual conditions or treatments  Talk to your doctor, nurse or pharmacist before following any medical regimen to see if it is safe and effective for you          Scheduled date of colon/egd (as of today) 2/14/23  Physician performing: Dr Solange Sierra  [facility]  Instructions given to patient:  miralax/dulcolax  Clearances: na

## 2022-12-12 ENCOUNTER — OFFICE VISIT (OUTPATIENT)
Dept: NEUROSURGERY | Facility: CLINIC | Age: 47
End: 2022-12-12

## 2022-12-12 ENCOUNTER — TELEPHONE (OUTPATIENT)
Dept: FAMILY MEDICINE CLINIC | Facility: CLINIC | Age: 47
End: 2022-12-12

## 2022-12-12 VITALS
HEIGHT: 65 IN | SYSTOLIC BLOOD PRESSURE: 124 MMHG | WEIGHT: 264 LBS | TEMPERATURE: 97.7 F | RESPIRATION RATE: 18 BRPM | HEART RATE: 84 BPM | BODY MASS INDEX: 43.99 KG/M2 | DIASTOLIC BLOOD PRESSURE: 88 MMHG

## 2022-12-12 DIAGNOSIS — I67.1 BRAIN ANEURYSM: Primary | ICD-10-CM

## 2022-12-12 DIAGNOSIS — I67.1 CEREBRAL ANEURYSM, NONRUPTURED: ICD-10-CM

## 2022-12-12 RX ORDER — SODIUM CHLORIDE 9 MG/ML
75 INJECTION, SOLUTION INTRAVENOUS CONTINUOUS
OUTPATIENT
Start: 2022-12-12

## 2022-12-12 NOTE — PROGRESS NOTES
Patient Id: Cat Cornejo is a 56 y o  female        Handedness: Right     Assessment/Plan:    Diagnoses and all orders for this visit:    Brain aneurysm  -     IR cerebral angiography; Future    Cerebral aneurysm, nonruptured  -     IR cerebral angiography; Future    Other orders  -     Diet NPO; Sips with meds; Standing  -     Nursing communication Apply gown prior to procedure; Standing  -     Have Patient Void On Call to Procedure Room; Standing  -     Insert and Maintain IV; Standing  -     sodium chloride 0 9 % infusion        Discussion Summary:   1  Previously clipped left MCA aneurysm, 2018  No follow-up arteriogram and concern for venous contamination and possible residual aneurysm on her CTA  We discussed the risks and benefits associated with formal arteriogram including bleeding vascular injury and stroke  Given her prior surgical treatment and artifact from noninvasive imaging I have recommended formal angiogram   She is willing to proceed and we will do so at her convenience  Chief Complaint: Follow-up      HPI:  This is a very pleasant 67-year-old female who had a left MCA aneurysm clipped by Dr Ellyn Gallardo at [facility] in 2018 and has transferred her care here for evaluation  She has not had any new neurosurgical or neurologic complaints  Last CTA was concerning for possible residual aneurysm or recurrence  As such she returns today with an MRA  She denies any new neurologic deficits  Her past medical history is significant for hypertension  Her past surgical history is significant for hysterectomy and craniotomy for clipping  She is not allergic to any medications  She is   She has no children  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  He denies any history of tobacco or alcohol abuse  Her sister has an aneurysm as well  It is untreated  On aware of how her parents passed away        Review of systems obtained by the MA reviewed and updated below  Review of Systems   Constitutional: Negative  HENT: Negative  Eyes: Negative  Respiratory: Negative  Cardiovascular: Negative  Gastrointestinal: Negative  Endocrine: Negative  Genitourinary: Positive for urgency  Musculoskeletal: Positive for arthralgias (bad knees ), back pain (lbp ) and gait problem (due to her knees , fell about 2 months ago misses a step )  Negative for neck pain  Skin: Negative  Allergic/Immunologic: Negative  Neurological: Positive for light-headedness (sometimes ) and headaches (recently a lot of headaches )  Negative for dizziness, tremors, seizures, speech difficulty, weakness and numbness  Hematological: Negative  Psychiatric/Behavioral: Negative  All other systems reviewed and are negative  Physical Exam  Vitals:    12/12/22 0826   BP: 124/88   Pulse: 84   Resp: 18   Temp: 97 7 °F (36 5 °C)     She is well appearing  Affect is appropriate  Body mass index is 43 93 kg/m²  [place] She is awake alert and oriented  Hearing and vision are grossly intact  Her pupils are equal round reactive to light  Her extraocular movements are intact  Her face is symmetric  Tongue is midline  Facial sensation is intact and symmetric throughout  Shoulder shrug is 5/5  There is no drift or dysmetria  She has full strength in her bilateral upper and lower extremities  She has normal muscle tone muscle bulk  Her biceps reflexes and patellar reflexes are 2+ and symmetric  Cami sign negative bilaterally  Sensation intact to light touch and pinprick throughout  Her gait is normal      Her heart rate is regular  Normal respiratory effort  Abdomen nondistended  Radial pulses 2+       The following portions of the patient's history were reviewed and updated as appropriate: allergies, current medications, past family history, past medical history, past social history, past surgical history and problem list     Active Ambulatory Problems Diagnosis Date Noted   • Benign essential hypertension 03/18/2022   • Vitamin D deficiency 03/18/2022   • Other insomnia 03/18/2022   • Morbid obesity with BMI of 40 0-44 9, adult [facility] ) 03/18/2022   • Cerebral aneurysm, nonruptured 11/29/2022     Resolved Ambulatory Problems     Diagnosis Date Noted   • No Resolved Ambulatory Problems     Past Medical History:   Diagnosis Date   • Anemia    • Aneurysm ([facility] )    • Hypertension    • Migraine        Past Surgical History:   Procedure Laterality Date   • BREAST CYST EXCISION Right 2021   • HYSTERECTOMY           Current Outpatient Medications:   •  amitriptyline (ELAVIL) 10 mg tablet, Take 2 tablets (20 mg total) by mouth in the morning, Disp: 180 tablet, Rfl: 1  •  chlorthalidone 25 mg tablet, Take 0 5 tablets (12 5 mg total) by mouth in the morning, Disp: 180 tablet, Rfl: 1  •  Cholecalciferol (Vitamin D3) 50 MCG (2000 UT) capsule, Take 1 capsule (2,000 Units total) by mouth every morning, Disp: 90 capsule, Rfl: 3  •  diclofenac (VOLTAREN) 75 mg EC tablet, Take 1 tablet (75 mg total) by mouth 2 (two) times a day as needed (knee pain), Disp: 60 tablet, Rfl: 1  •  losartan (COZAAR) 100 MG tablet, Take 1 tablet (100 mg total) by mouth in the morning, Disp: 90 tablet, Rfl: 0  •  omeprazole (PriLOSEC) 40 MG capsule, Take 1 capsule (40 mg total) by mouth daily before breakfast, Disp: 30 capsule, Rfl: 3  •  polyethylene glycol (GLYCOLAX) 17 GM/SCOOP powder, Take 17 g by mouth daily, Disp: 255 g, Rfl: 0  •  verapamil (VERELAN PM) 120 MG 24 hr capsule, Take 1 capsule (120 mg total) by mouth in the morning, Disp: 90 capsule, Rfl: 3    Results/Data: Imaging reviewed in detail with patient as well as reports

## 2022-12-24 ENCOUNTER — APPOINTMENT (OUTPATIENT)
Dept: LAB | Facility: HOSPITAL | Age: 47
End: 2022-12-24

## 2022-12-24 DIAGNOSIS — I67.1 BRAIN ANEURYSM: ICD-10-CM

## 2022-12-24 LAB
ANION GAP SERPL CALCULATED.3IONS-SCNC: 13 MMOL/L (ref 4–13)
APTT PPP: 31 SECONDS (ref 23–37)
BASOPHILS # BLD AUTO: 0.05 THOUSANDS/ÂΜL (ref 0–0.1)
BASOPHILS NFR BLD AUTO: 1 % (ref 0–1)
BUN SERPL-MCNC: 9 MG/DL (ref 5–25)
CALCIUM SERPL-MCNC: 9.5 MG/DL (ref 8.3–10.1)
CHLORIDE SERPL-SCNC: 99 MMOL/L (ref 96–108)
CO2 SERPL-SCNC: 27 MMOL/L (ref 21–32)
CREAT SERPL-MCNC: 0.75 MG/DL (ref 0.6–1.3)
EOSINOPHIL # BLD AUTO: 0.19 THOUSAND/ÂΜL (ref 0–0.61)
EOSINOPHIL NFR BLD AUTO: 3 % (ref 0–6)
ERYTHROCYTE [DISTWIDTH] IN BLOOD BY AUTOMATED COUNT: 16 % (ref 11.6–15.1)
GFR SERPL CREATININE-BSD FRML MDRD: 95 ML/MIN/1.73SQ M
GLUCOSE P FAST SERPL-MCNC: 92 MG/DL (ref 65–99)
HCT VFR BLD AUTO: 38.5 % (ref 34.8–46.1)
HGB BLD-MCNC: 11.9 G/DL (ref 11.5–15.4)
IMM GRANULOCYTES # BLD AUTO: 0.03 THOUSAND/UL (ref 0–0.2)
IMM GRANULOCYTES NFR BLD AUTO: 0 % (ref 0–2)
INR PPP: 1.01 (ref 0.84–1.19)
LYMPHOCYTES # BLD AUTO: 2.13 THOUSANDS/ÂΜL (ref 0.6–4.47)
LYMPHOCYTES NFR BLD AUTO: 32 % (ref 14–44)
MCH RBC QN AUTO: 23.1 PG (ref 26.8–34.3)
MCHC RBC AUTO-ENTMCNC: 30.9 G/DL (ref 31.4–37.4)
MCV RBC AUTO: 75 FL (ref 82–98)
MONOCYTES # BLD AUTO: 0.47 THOUSAND/ÂΜL (ref 0.17–1.22)
MONOCYTES NFR BLD AUTO: 7 % (ref 4–12)
NEUTROPHILS # BLD AUTO: 3.81 THOUSANDS/ÂΜL (ref 1.85–7.62)
NEUTS SEG NFR BLD AUTO: 57 % (ref 43–75)
NRBC BLD AUTO-RTO: 0 /100 WBCS
PLATELET # BLD AUTO: 351 THOUSANDS/UL (ref 149–390)
PMV BLD AUTO: 10.8 FL (ref 8.9–12.7)
POTASSIUM SERPL-SCNC: 2.9 MMOL/L (ref 3.5–5.3)
PROTHROMBIN TIME: 13.3 SECONDS (ref 11.6–14.5)
RBC # BLD AUTO: 5.16 MILLION/UL (ref 3.81–5.12)
SODIUM SERPL-SCNC: 139 MMOL/L (ref 135–147)
WBC # BLD AUTO: 6.68 THOUSAND/UL (ref 4.31–10.16)

## 2022-12-29 ENCOUNTER — TELEPHONE (OUTPATIENT)
Dept: RADIOLOGY | Facility: HOSPITAL | Age: 47
End: 2022-12-29

## 2022-12-29 NOTE — PRE-PROCEDURE INSTRUCTIONS
Pre-procedure Instructions for Interventional Radiology  [address]  [place] [address] [phone]    You are scheduled for a/an Cerebral Angiogram     On Friday 1/6/23  Your tentative arrival time is 0900  Short stay will notify you the day before your procedure with the exact arrival time and the location to arrive  To prepare for your procedure:  1  Please arrange for someone to drive you home after the procedure and stay with you until the next morning if you are instructed to do so  This is typically for patients receiving some type of sedative or anesthetic for the procedure  2  DO NOT EAT OR DRINK ANYTHING after midnight on the evening before your procedure including candy & gum   3  ONLY SIPS OF WATER with your medications are allowed on the morning of your procedure  4  TAKE ALL OF YOUR REGULAR MEDICATIONS THE MORNING OF YOUR PROCEDURE with sips of water! We may call you to stop some of your blood sugar, blood pressure and blood thinning medications depending on the procedure  Please take all of these medications unless we instruct you to stop them  5  If you have an allergy to x-ray dye, please contact Interventional Radiology for an x-ray dye preparation which usually consists of an oral steroid and Benadryl  The day of your procedure:  1  Bring a list of the medications you take at home  2  Bring medications you take for breathing problems (such as inhalers), medications for chest pain, or both  3  Bring a case for your glasses or contacts  4  Bring your insurance card and a form of photo ID   5  Please leave all valuables such as credit cards and jewelry at home  6  Report to the registration desk in the main lobby at the [facility]  Ask to be directed to [facility]  7  While your procedure is being performed, your family may wait in the Radiology Waiting Room on the 1st floor in Radiology    if they need to leave, they may provide a number to be called following the procedure  8  Be prepared to stay overnight just in case  Sometimes procedures will indicate the need for further observation or treatment  9  If you are scheduled for a follow-up visit with the Interventional Radiologist after your procedure, you will be called with a date and time      Special Instructions (Medications to stop taking before your procedure etc ):

## 2022-12-30 ENCOUNTER — TELEPHONE (OUTPATIENT)
Dept: NEUROLOGY | Facility: CLINIC | Age: 47
End: 2022-12-30

## 2022-12-30 NOTE — TELEPHONE ENCOUNTER
Called Pt and asked her to call back in regards to her upcoming appt  Unable to find records from [facility] neurology  More information is needed for appt   Please ask Headache triage questions and message me when Pt calls  Thank you

## 2023-01-05 ENCOUNTER — TELEPHONE (OUTPATIENT)
Dept: NEUROLOGY | Facility: CLINIC | Age: 48
End: 2023-01-05

## 2023-01-06 ENCOUNTER — ANESTHESIA (OUTPATIENT)
Dept: RADIOLOGY | Facility: HOSPITAL | Age: 48
End: 2023-01-06

## 2023-01-06 ENCOUNTER — ANESTHESIA EVENT (OUTPATIENT)
Dept: RADIOLOGY | Facility: HOSPITAL | Age: 48
End: 2023-01-06

## 2023-01-06 ENCOUNTER — HOSPITAL ENCOUNTER (OUTPATIENT)
Dept: RADIOLOGY | Facility: HOSPITAL | Age: 48
Discharge: HOME | End: 2023-01-06
Attending: NEUROLOGICAL SURGERY

## 2023-01-06 VITALS
TEMPERATURE: 98.1 F | WEIGHT: 267.7 LBS | HEIGHT: 66 IN | BODY MASS INDEX: 43.02 KG/M2 | HEART RATE: 75 BPM | DIASTOLIC BLOOD PRESSURE: 74 MMHG | OXYGEN SATURATION: 96 % | SYSTOLIC BLOOD PRESSURE: 134 MMHG | RESPIRATION RATE: 18 BRPM

## 2023-01-06 DIAGNOSIS — I67.1 CEREBRAL ANEURYSM, NONRUPTURED: ICD-10-CM

## 2023-01-06 DIAGNOSIS — I67.1 BRAIN ANEURYSM: ICD-10-CM

## 2023-01-06 RX ORDER — PROPOFOL 10 MG/ML
INJECTION, EMULSION INTRAVENOUS AS NEEDED
Status: DISCONTINUED | OUTPATIENT
Start: 2023-01-06 | End: 2023-01-06

## 2023-01-06 RX ORDER — VERAPAMIL HYDROCHLORIDE 2.5 MG/ML
INJECTION, SOLUTION INTRAVENOUS AS NEEDED
Status: COMPLETED | OUTPATIENT
Start: 2023-01-06 | End: 2023-01-06

## 2023-01-06 RX ORDER — FENTANYL CITRATE 50 UG/ML
INJECTION, SOLUTION INTRAMUSCULAR; INTRAVENOUS AS NEEDED
Status: DISCONTINUED | OUTPATIENT
Start: 2023-01-06 | End: 2023-01-06

## 2023-01-06 RX ORDER — SODIUM CHLORIDE 9 MG/ML
75 INJECTION, SOLUTION INTRAVENOUS CONTINUOUS
Status: DISCONTINUED | OUTPATIENT
Start: 2023-01-06 | End: 2023-01-07 | Stop reason: HOSPADM

## 2023-01-06 RX ORDER — HEPARIN SODIUM 1000 [USP'U]/ML
INJECTION, SOLUTION INTRAVENOUS; SUBCUTANEOUS AS NEEDED
Status: COMPLETED | OUTPATIENT
Start: 2023-01-06 | End: 2023-01-06

## 2023-01-06 RX ORDER — IODIXANOL 320 MG/ML
200 INJECTION, SOLUTION INTRAVASCULAR
Status: COMPLETED | OUTPATIENT
Start: 2023-01-06 | End: 2023-01-06

## 2023-01-06 RX ORDER — LIDOCAINE HYDROCHLORIDE 10 MG/ML
INJECTION, SOLUTION EPIDURAL; INFILTRATION; INTRACAUDAL; PERINEURAL AS NEEDED
Status: COMPLETED | OUTPATIENT
Start: 2023-01-06 | End: 2023-01-06

## 2023-01-06 RX ORDER — NITROGLYCERIN 20 MG/100ML
INJECTION INTRAVENOUS AS NEEDED
Status: COMPLETED | OUTPATIENT
Start: 2023-01-06 | End: 2023-01-06

## 2023-01-06 RX ADMIN — NITROGLYCERIN 400 MCG: 20 INJECTION INTRAVENOUS at 10:34

## 2023-01-06 RX ADMIN — VERAPAMIL HYDROCHLORIDE 5 MG: 2.5 INJECTION INTRAVENOUS at 10:34

## 2023-01-06 RX ADMIN — IODIXANOL 120 ML: 320 INJECTION, SOLUTION INTRAVASCULAR at 11:07

## 2023-01-06 RX ADMIN — HEPARIN SODIUM 4000 UNITS: 1000 INJECTION INTRAVENOUS; SUBCUTANEOUS at 10:35

## 2023-01-06 RX ADMIN — LIDOCAINE HYDROCHLORIDE 1 ML: 10 INJECTION, SOLUTION EPIDURAL; INFILTRATION; INTRACAUDAL; PERINEURAL at 10:35

## 2023-01-06 RX ADMIN — PROPOFOL 40 MG: 10 INJECTION, EMULSION INTRAVENOUS at 10:33

## 2023-01-06 RX ADMIN — FENTANYL CITRATE 50 MCG: 50 INJECTION, SOLUTION INTRAMUSCULAR; INTRAVENOUS at 10:23

## 2023-01-06 RX ADMIN — NITROGLYCERIN 600 MCG: 20 INJECTION INTRAVENOUS at 10:33

## 2023-01-06 RX ADMIN — SODIUM CHLORIDE 75 ML/HR: 0.9 INJECTION, SOLUTION INTRAVENOUS at 09:10

## 2023-01-06 RX ADMIN — LIDOCAINE HYDROCHLORIDE 3 ML: 10 INJECTION, SOLUTION EPIDURAL; INFILTRATION; INTRACAUDAL; PERINEURAL at 10:33

## 2023-01-06 NOTE — ANESTHESIA PREPROCEDURE EVALUATION
Procedure:  IR CEREBRAL ANGIOGRAPHY    Relevant Problems   CARDIO   (+) Benign essential hypertension             Anesthesia Plan  ASA Score- 3     Anesthesia Type- IV sedation with anesthesia with ASA Monitors  Additional Monitors:   Airway Plan:     Comment: IV sedation, GA back up; standard ASA monitors  Risks and benefits discussed with patient; patient consented and agrees to proceed  I saw and evaluated the patient  If seen with CRNA, we have discussed the anesthetic plan and I am in agreement that the plan is appropriate for the patient          Plan Factors-    Chart reviewed  Existing labs reviewed  Induction- intravenous  Postoperative Plan-     Informed Consent- Anesthetic plan and risks discussed with patient  I personally reviewed this patient with the CRNA  Discussed and agreed on the Anesthesia Plan with the CRNA  Rhona Segura

## 2023-01-06 NOTE — DISCHARGE INSTRUCTIONS
Today, you underwent a diagnostic cerebral angiogram under the care of Dr Jamila Rodriguez for evaluation of cerebral angiogram   ? The following instructions will help you care for yourself, or be cared for upon your return home today  These are guidelines for your care right after your surgery only  ? Notify Your Doctor or Nurse if you have any of the following:  ? SYMPTOMS OF WOUND INFECTION--   Increased pain in or around the incision   Swelling around the incision  Any drainage from the incision  Incision separates or opens up  Warmth in the tissues around the incision  Redness or tenderness on the skin near the incision   Fever (temperature greater than 101 degrees F)   ? NEUROLOGICAL CHANGES--  Change in alertness  Increased sleepiness   Nausea and vomiting   New onset of numbness or weakness in arms or legs   New problems with your bowels or bladder  New or worse problems with balance or walking  Seizures, new or worsening  ? UNRELIEVED HEADACHE PAIN--  New or increased pain unrelieved with pain medications   Pain associated with nausea and vomiting   Pain associated with other symptoms  ? QUESTIONS OR PROBLEMS--  Any questions or problems that you are unsure about  Wound Care:  Keep Incision Clean and Dry   You may shower daily, but do not soak incision  Pat dry after showering  No tub baths, soaking, swimming for 1 week after angiogram    You do not need to cover the incision  Mild to moderate bruising and tenderness to the site is expected and may last up to 1-2 weeks after your procedure  ?  A closure device was placed at the catheter insertion site  This is MRI compatible  Remove the dressing 24 hours after your procedure  If your groin site is bleeding, apply firm pressure for 10 minutes  Reinforce dressing rather than removing and checking frequently  If continues to bleed through the dressing after 1 hour, contact your neurosurgeon's office  Anticipatory Education:  ?   PAIN MED W/ Acetaminophen (Tylenol)  --IF a prescription for pain medicine has been sent home with you:  --Narcotic pain medication may cause constipation  Be sure to take stool softeners or laxatives while you are on narcotic pain medication  --Do not drive after taking prescription pain medicine  ?  If this medicine is too strong, or no longer necessary, or we did NOT recommend/prescribe oral narcotics, you may take:   - Tylenol Extra-strength/Acetaminophen, 2 tablets every 4-6 hours as needed for mild pain  DO NOT TAKE MORE THAN 4000MG PER DAY from combined sources  NOTE: Remember to eat when taking pain medicines in order to avoid nausea  Watch for constipation  Eat plenty of fruits, vegetables, juices, and drink 6-8 glasses of water each day  Constipation: Stay active and drink at least 6-8 cups of fluid each day to prevent constipation  If you need a laxative or stool softener follow the package directions or consult with your local pharmacists if you have questions  ? After anesthesia, rest for 24 hours  Do not drive, drink alcohol beverages or make any important decisions during this time  General anesthesia may cause sore throat, jaw discomfort or muscle aches  These symptoms can last for one or two days  Activity: Please follow these instructions:  Advance your activity as you can tolerate  You may do light house work; nothing strenuous   You may walk all you want  You may go up and down the steps  Use the railing for support  Do not do excessive bending, straining or heavy lifting for 48 hours after your procedure  Do not drive or return to work until you are instructed   It is normal for your energy level and sleep patterns to change after surgery  Get extra sleep at night and take naps during the day to help you feel less tired  Take rest periods during the day  Complete recovery may take several weeks  ?  You may resume driving after 45-81 hours recovery     You may return to work after 48 hours of recovery  ?  Diet:  Your doctor has recommended that you follow these diet instructions at home  Refer to the patient education materials you received during your hospital stay  If you would like more nutrition counseling, ask your doctor about making an appointment with an outpatient dietitian  Resume your home diet  ? Medications:  Please resume your home medications as instructed  ? Home Supplies and Equipment:  none  Additional Contacts:  ? CONTACTS FOR NEUROSURGERY: You may call your neurosurgeon’s office if you have questions between 8:30 am and 4:30 pm  You may request to speak to the nurse practitioner who is available Monday through Friday  ?  For off hours or the weekend you may call your neurosurgeon's office to leave a message

## 2023-01-06 NOTE — OP NOTE
OPERATIVE REPORT  PATIENT NAME: Dhiraj Medina     :  1975  MRN: [id number]   Pt Location: Interventional radiology    SURGERY DATE: 23     Preop Diagnosis:  1  History of aneurysm clipping, left MCA at [facility]     Postop Diagnosis  1  History of aneurysm clipping, left MCA at [facility]     Procedure:  Use of ultrasound  Right radial arteriogram   Right Common Carotid Arteriogram  Right Internal Carotid Arteriogram  Left Common Carotid Arteriogram  Left Internal Carotid Arteriogram  3D rotational arteriogram of the left internal carotid artery with postprocess images reviewed at a separate workstation   right Vertebral Artery Arteriogram    Surgeon:   Jada Huddleston MD    Specimen(s):  None    Estimated Blood Loss:   None    Drains:  None    Anesthesia Type:   Monitored Anesthesia Care     Complications:  None    Operative Indications:  Dhiraj Medina  is a very pleasant 56 y o  female who previously had a left MCA aneurysm clipped by Dr Kadie Cedeno at [facility]   After discussing the risks and benefits of a diagnostic cerebral arteriogram including bleeding, stroke, groin hematoma, and death the patient elected to proceed  Procedure Details:  After obtaining written informed consent, the patient was brought into the operating room and moved to the OR table in supine fashion  The right radial artery was prepped and draped in the usual sterile fashion  Monitored anesthesia care was induced  A surgical time-out was performed  3 cc mixture of lidocaine and 400 mcg of nitroglycerin was injected immediately above the right radial artery  Ultrasound guidance under real-time visualization was used to guide the micro puncture needle into the right radial artery  Next, a 5 [employer] tapered sheath was then placed    Next and infusion of 300 mcg of nitroglycerin, 4000 units heparin, and 5 mg of verapamil were slowly injected intra-arterially through the right radial sheath  Radial artery arteriogram then performed  Next a 5 [employer] Roland glide catheter was advanced and reshaped  The catheter was then withdrawn into the aortic arch and advanced into the left common carotid artery  AP lateral and oblique images of the left cervical carotid were obtained  The catheter was then advanced and the left internal carotid artery was then catheterized  AP lateral and magnified oblique images of the left intracranial carotid circulation were obtained  A 3D rotational arteriogram left internal carotid artery was then performed  Post process images were reviewed at a separate workstation  The right common carotid artery was then catheterized  AP lateral and oblique images of the right cervical carotid were obtained  The catheter was then advanced and the right internal carotid artery was then catheterized  AP lateral and magnified oblique images of the right intracranial carotid circulation were obtained  The catheter was then withdrawn from the body and a TR compression band was placed  There was appropriate hemostasis  The patient was then awoken from monitored anesthesia care and found to be in baseline neurologic condition  All sponge and needle counts were correct  INTERPRETATION OF ANGIOGRAPHIC FINDINGS:   1  The Left common carotid circulation reveals antegrade flow into the internal and external carotid arteries  There is no hemodynamically significant carotid stenosis as defined by NASCET criteria  2  The Left internal carotid artery circulation reveals antegrade flow into the middle cerebral and anterior cerebral arteries  There appears to be a small 2 x 3 mm post clipping the dogear along the MCA terminus, there is also a small 2 mm aneurysm along the superior division of the M2  The capillary and venous phases are unremarkable       3   3D rotational arteriogram of the left internal carotid artery with postprocess images better reviews the location of the aneurysm clip in 2 x 3 mm recurrence/dogear  There is also a separate small 2 mm extrusion along the M2, this appears consistent with a small aneurysm  4  The Right common carotid circulation reveals antegrade flow into the internal and external carotid arteries  There is no hemodynamically significant carotid stenosis as defined by NASCET criteria  5  The Right internal carotid artery circulation reveals antegrade flow into the middle cerebral and anterior cerebral arteries  There is no evidence of aneurysm, AVM, or vascular malformation  The capillary and venous phases are unremarkable  Impression:  Small 2x3mm left MCA aneurysm dogear/recurrence   Small sub-2mm left M2 aneurysm    Patient Disposition:  APU    SIGNATURE: Andrea Painting MD  DATE: 01/06/23   TIME: 11:16 AM

## 2023-01-06 NOTE — H&P
Patient seen and examined independently  Clinic note from 12/12/22 remains current  Patient is not on any new medications and has not had any new medical problems  /85 (BP Location: Right arm)   Pulse 82   Temp 97 7 °F (36 5 °C) (Temporal)   Resp 18   Ht 5' 5 5" (1 664 m)   Wt 121 kg (267 lb 11 2 oz)   LMP  (LMP Unknown)   SpO2 98%   BMI 43 87 kg/m²  On exam, patient is neurologically intact  Heart rate is regular  Breath sounds are clear  Plan to proceed with diagnostic cerebral arteriogram to better delineate prior clipping of L MCA aneurysm

## 2023-01-06 NOTE — ANESTHESIA POSTPROCEDURE EVALUATION
Post-Op Assessment Note    CV Status:  Stable  Pain Score: 0    Pain management: adequate     Mental Status:  Awake   Hydration Status:  Stable   PONV Controlled:  None   Airway Patency:  Patent      Post Op Vitals Reviewed: Yes      Staff: CRNA         No notable events documented      BP   144/81   Temp      Pulse  78   Resp   16   SpO2   100% RA

## 2023-01-06 NOTE — SEDATION DOCUMENTATION
Cerebral angiogram performed by Dr Farzana Marin  Procedure tolerated well, anesthesia present throughout the case  Right radial access obtained at 1034, closure with a TR band at 1059  IR Procedure Bedrest Start Time is 1100

## 2023-01-06 NOTE — SEDATION DOCUMENTATION
Cerebral angiogram performed by Dr Bright Daniel  Procedure tolerated well, anesthesia present throughout the case  Right radial access obtained at 1034, closure with a TR band at 1059  IR Procedure Bedrest Start Time is 1100

## 2023-01-10 ENCOUNTER — TELEPHONE (OUTPATIENT)
Dept: NEUROSURGERY | Facility: CLINIC | Age: 48
End: 2023-01-10

## 2023-01-10 NOTE — TELEPHONE ENCOUNTER
Completed post angio callback to Jessica Bello at primary contact number  The patient denies any pain, swelling, drainage, fevers at the puncture site  Is not currently experiencing any numbness, tingling, or weakness in her arm  Reports no headaches at this time  Advised that it is normal to experience fatigue and mild headaches for a few days after the procedure  Advised that tylenol should provide adequate relief  Explained that she should contact the office if she experiences any pain, swelling, or drainage from the site, and report to the ER or call 911 if she experiences [facility], visual disturbance, of confusion/disorientation, slurred speech, ambulatory dysfunction  Reminded of post procedure follow-up scheduled on 1/23/2023  Patient appreciative of call

## 2023-01-11 ENCOUNTER — OFFICE VISIT (OUTPATIENT)
Dept: OBGYN CLINIC | Facility: MEDICAL CENTER | Age: 48
End: 2023-01-11

## 2023-01-11 VITALS
DIASTOLIC BLOOD PRESSURE: 83 MMHG | SYSTOLIC BLOOD PRESSURE: 120 MMHG | HEART RATE: 91 BPM | WEIGHT: 273.2 LBS | BODY MASS INDEX: 43.91 KG/M2 | HEIGHT: 66 IN

## 2023-01-11 DIAGNOSIS — M17.0 PRIMARY OSTEOARTHRITIS OF BOTH KNEES: Primary | ICD-10-CM

## 2023-01-11 RX ORDER — BUPIVACAINE HYDROCHLORIDE 2.5 MG/ML
2 INJECTION, SOLUTION INFILTRATION; PERINEURAL
Status: COMPLETED | OUTPATIENT
Start: 2023-01-11 | End: 2023-01-11

## 2023-01-11 RX ORDER — TRIAMCINOLONE ACETONIDE 40 MG/ML
20 INJECTION, SUSPENSION INTRA-ARTICULAR; INTRAMUSCULAR
Status: COMPLETED | OUTPATIENT
Start: 2023-01-11 | End: 2023-01-11

## 2023-01-11 RX ADMIN — BUPIVACAINE HYDROCHLORIDE 2 ML: 2.5 INJECTION, SOLUTION INFILTRATION; PERINEURAL at 08:53

## 2023-01-11 RX ADMIN — TRIAMCINOLONE ACETONIDE 20 MG: 40 INJECTION, SUSPENSION INTRA-ARTICULAR; INTRAMUSCULAR at 08:53

## 2023-01-11 NOTE — PROGRESS NOTES
Assessment/Plan:  1  Primary osteoarthritis of both knees      Orders Placed This Encounter   Procedures   • Large joint arthrocentesis: bilateral knee     · Patient has severe bilateral knee osteoarthritis  · Received  bilateral knee steroid injections today  Patient knows to ice and avoid strenuous activity for 1-2 days if needed  · Continue taking diclofenac 75 mg as needed for pain relief  May add tylenol up to 3000 mg per day  · Continue with home exercises from physical therapy  · Patient aware she can repeat CSI every 3 months if needed  No follow-ups on file  I answered all of the patient's questions during the visit and provided education of the patient's condition during the visit  The patient verbalized understanding of the information given and agrees with the plan  This note was dictated using [employer] software  It may contain errors including improperly dictated words  Please contact physician directly for any questions  Subjective   Chief Complaint:   Chief Complaint   Patient presents with   • Right Knee - Follow-up   • Left Knee - Follow-up       [facility]  Kandis Iyer is a 56 y o  female who presents for follow up for chronic bilateral knee pain due to severe osteoarthritis  Patient had bilateral knee steroid injections on 9/6/2022 and states she did have relief with the injections  She did go to physical therapy for 2 sessions and occasionally has been doing the home exercises  She is having pain over the anterior medial aspect bilaterally, pain worse on the right  She states pain is worse with weightbearing, increase activities and with transitional positions  She has  been taking diclofenac 75 mg for pain relief  She occasionally wears knee brace for comfort  Patient at this time is not considering having surgery  Review of Systems  ROS:    See HPI for musculoskeletal review     All other systems reviewed are negative     History:  Past Medical History:   Diagnosis Date   • Anemia    • Aneurysm ([address] )    • Hypertension    • Migraine      Past Surgical History:   Procedure Laterality Date   • BREAST CYST EXCISION Right 2021   • HYSTERECTOMY       Social History   Social History     Substance and Sexual Activity   Alcohol Use Never     Social History     Substance and Sexual Activity   Drug Use Never     Social History     Tobacco Use   Smoking Status Never   Smokeless Tobacco Never     Family History:   Family History   Problem Relation Age of Onset   • Migraines Sister    • No Known Problems Maternal Grandmother    • No Known Problems Maternal Grandfather    • No Known Problems Paternal Grandmother    • No Known Problems Paternal Grandfather    • Breast cancer Maternal Aunt        Current Outpatient Medications on File Prior to Visit   Medication Sig Dispense Refill   • amitriptyline (ELAVIL) 10 mg tablet Take 2 tablets (20 mg total) by mouth in the morning 180 tablet 1   • chlorthalidone 25 mg tablet Take 0 5 tablets (12 5 mg total) by mouth in the morning 180 tablet 1   • Cholecalciferol (Vitamin D3) 50 MCG (2000 UT) capsule Take 1 capsule (2,000 Units total) by mouth every morning 90 capsule 3   • diclofenac (VOLTAREN) 75 mg EC tablet Take 1 tablet (75 mg total) by mouth 2 (two) times a day as needed (knee pain) 60 tablet 1   • losartan (COZAAR) 100 MG tablet Take 1 tablet (100 mg total) by mouth in the morning 90 tablet 0   • omeprazole (PriLOSEC) 40 MG capsule Take 1 capsule (40 mg total) by mouth daily before breakfast 30 capsule 3   • polyethylene glycol (GLYCOLAX) 17 GM/SCOOP powder Take 17 g by mouth daily 255 g 0   • verapamil (VERELAN PM) 120 MG 24 hr capsule Take 1 capsule (120 mg total) by mouth in the morning 90 capsule 3     No current facility-administered medications on file prior to visit       No Known Allergies     Objective     /83   Pulse 91   Ht 5' 5 5" (1 664 m)   Wt 124 kg (273 lb 3 2 oz)   LMP  (LMP Unknown)   BMI 44 77 kg/m²      PE:  AAOx 3  WDWN  Hearing intact, no drainage from eyes  no audible wheezing  no abdominal distension  LE compartments soft, skin intact    Ortho Exam:  bilateral Knee:   No erythema  no swelling  no effusion  no warmth  +TTP diffusely about bilateral knees  AROM: 3- 110  Stable to varus/valgus stress      Large joint arthrocentesis: bilateral knee  Universal Protocol:  Consent: Verbal consent obtained  Risks and benefits: risks, benefits and alternatives were discussed  Consent given by: patient  Time out: Immediately prior to procedure a "time out" was called to verify the correct patient, procedure, equipment, support staff and site/side marked as required    Timeout called at: 1/11/2023 8:52 AM   Patient understanding: patient states understanding of the procedure being performed  Site marked: the operative site was marked  Supporting Documentation  Indications: pain and joint swelling   Procedure Details  Location: knee - bilateral knee  Needle size: 22 G  Approach: anterolateral    Medications (Right): 2 mL bupivacaine 0 25 %; 20 mg triamcinolone acetonide 40 mg/mLMedications (Left): 2 mL bupivacaine 0 25 %; 20 mg triamcinolone acetonide 40 mg/mL   Patient tolerance: patient tolerated the procedure well with no immediate complications  Dressing:  Sterile dressing applied

## 2023-01-12 ENCOUNTER — OFFICE VISIT (OUTPATIENT)
Dept: NEUROLOGY | Facility: CLINIC | Age: 48
End: 2023-01-12

## 2023-01-12 VITALS
HEART RATE: 90 BPM | SYSTOLIC BLOOD PRESSURE: 142 MMHG | DIASTOLIC BLOOD PRESSURE: 90 MMHG | BODY MASS INDEX: 43.59 KG/M2 | WEIGHT: 266 LBS

## 2023-01-12 DIAGNOSIS — F32.A ANXIETY AND DEPRESSION: ICD-10-CM

## 2023-01-12 DIAGNOSIS — G47.00 INSOMNIA, UNSPECIFIED TYPE: ICD-10-CM

## 2023-01-12 DIAGNOSIS — F41.9 ANXIETY AND DEPRESSION: ICD-10-CM

## 2023-01-12 DIAGNOSIS — R29.818 SUSPECTED SLEEP APNEA: ICD-10-CM

## 2023-01-12 DIAGNOSIS — G43.109 MIGRAINE WITH AURA AND WITHOUT STATUS MIGRAINOSUS, NOT INTRACTABLE: Primary | ICD-10-CM

## 2023-01-12 RX ORDER — AMITRIPTYLINE HYDROCHLORIDE 10 MG/1
TABLET, FILM COATED ORAL
Qty: 180 TABLET | Refills: 2 | Status: SHIPPED | OUTPATIENT
Start: 2023-01-12 | End: 2023-02-25

## 2023-01-12 NOTE — PROGRESS NOTES
[facility] Consult  PATIENT:  Vu Ponce  MRN:  [id number]  :  1975  DATE OF SERVICE:  2023  REFERRED BY: Self, Referral  PMD: Paticia Merlin, PA-C    Assessment/Plan:     Vu Ponce is a delightful 56 y o  female with a past medical history that includes hypertension, cerebral aneurysm, insomnia, morbid obesity referred here for evaluation of headache  Initial evaluation 2023     Ms Leeann López reports a longstanding history of migraines  She states that she previously used to follow with neurology at [facility], but we will need to obtain those records  She has a history of anxiety and depression as well as insomnia and was previously started on amitriptyline for treatment of those issues as well as headaches  She states that she feels it helped a little bit, but is currently on a low-dose of 20 mg nightly  We decided that we would try increasing it to 30 mg and subsequently 40 mg to see if she has any better response  From an abortive standpoint, she reports that Advil will take away the pain entirely in about 15 minutes  Of note, she continues to follow with neurosurgery for known intracranial aneurysms, one of which was surgically clipped previously in 2018 at [facility]  In the future, we may need to consider obtaining a sleep study to rule out obstructive sleep apnea, which could certainly be contributing to a combination of her mood related issues and headaches  Migraine with aura and without status migrainosus, not intractable  Anxiety and depression  Insomnia, unspecified type  -     amitriptyline (ELAVIL) 10 mg tablet; Take 3 tablets (30 mg total) by mouth in the morning for 14 days, THEN 4 tablets (40 mg total) in the morning      Suspected sleep apnea    Workup:  - Neurologic assessment reveals unremarkable neurological exam  - With no new or concerning symptoms, no red flags and an unremarkable neurologic exam, there is no specific indication for further evaluation with MRI brain  However, this could be obtained at any time if indicated  -CTA head with and without contrast 10/25/2022: Postsurgical changes with left MCA aneurysm clip  No acute findings  Limited evaluation for residual/recurrent aneurysm  No stenosis  Preventative:  - we discussed headache hygiene and lifestyle factors that may improve headaches  -Continue amitriptyline but plan to increase to 30 mg for 2 weeks and then 40 mg going forward  Explained to the patient that if she responds well to 30 mg, she can stay there  - Currently on through other providers: Losartan, verapamil  - Past/ failed/contraindicated: None  - future options: SNRI, Topamax, CGRP med, botox    Acute:  - discussed not taking over-the-counter or prescription pain medications more than 2-3 days per week to prevent medication overuse/rebound headache  - Currently on through other providers: None  - Past/ failed/contraindicated: Ideally would like to avoid triptans due history of multiple intracranial aneurysms  - future options:  prochlorperazine, Toradol IM or p o , could consider trial of 5 days of Depakote 500 mg nightly or dexamethasone 2 mg daily for prolonged migraine, jose Villalpando nurtec  Patient instructions   Headache Calendar  Please maintain a headache calendar  Consider using phone applications such as Migraine Carlos Manuel or [place] Migraine Tracker    Headache/migraine treatment:   Acute medications (for immediate treatment of a headache): It is ok to take ibuprofen, acetaminophen or naproxen (Advil, Tylenol,  Aleve, Excedrin) if they help your headaches you should limit these to No more than 2-3 times a week to avoid medication overuse/rebound headaches       Prescription preventive medications for headaches/migraines   (to take every day to help prevent headaches - not to take at the time of headache):  [x] Amitriptyline  - Increase to 30mg at bedtime for 2 weeks  - Then increase to 40mg at bedtime going forward  - If you feel that 30mg is working well for you, you can stay there    *Typically these types of medications take time until you see the benefit, although some may see improvement in days, often it may take weeks, especially if the medication is being titrated up to a beneficial level  Please contact us if there are any concerns or questions regarding the medication  Lifestyle Recommendations:  [x] SLEEP - Maintain a regular sleep schedule: Adults need at least 7-8 hours of uninterrupted a night  Maintain good sleep hygiene:  Going to bed and waking up at consistent times, avoiding excessive daytime naps, avoiding caffeinated beverages in the evening, avoid excessive stimulation in the evening and generally using bed primarily for sleeping  One hour before bedtime would recommend turning lights down lower, decreasing your activity (may read quietly, listen to music at a low volume)  When you get into bed, should eliminate all technology (no texting, emailing, playing with your phone, iPad or tablet in bed)  [x] HYDRATION - Maintain good hydration  Drink  2L of fluid a day (4 typical small water bottles)  [x] DIET - Maintain good nutrition  In particular don't skip meals and try and eat healthy balanced meals regularly  [x] TRIGGERS - Look for other triggers and avoid them: Limit caffeine to 1-2 cups a day or less  Avoid dietary triggers that you have noticed bring on your headaches (this could include aged cheese, peanuts, MSG, aspartame and nitrates)  [x] EXERCISE - physical exercise as we all know is good for you in many ways, and not only is good for your heart, but also is beneficial for your mental health, cognitive health and  chronic pain/headaches  I would encourage at the least 5 days of physical exercise weekly for at least 30 minutes  Education and Follow-up  [x] Please call with any questions or concerns   Of course if any new concerning symptoms go to the emergency department  [x] Follow up in 4 months  CC: We had the pleasure of evaluating Rachel Martinez in neurological consultation today  Rachel Martinez is a right handed female who presents today for evaluation of headaches  History obtained from patient as well as available medical record review  History of Present Illness:   Current medical illnesses  or past medical history include hypertension, cerebral aneurysm, insomnia, morbid obesity    Pertinent history:  -Seen by neurosurgery 12/12/2022 as a follow-up for history of previously clipped left MCA aneurysm  This was done at [facility] in 2018  Recent transfer of care to [facility]  Underwent angiogram 1/6/2023 which revealed a small 2 x 3 left MCA aneurysm recurrence  Small sub-2 mm left M2 aneurysm  Headaches started at what age? 23years old  How often do the headaches occur?  - as of 1/12/2023: 15/30  What time of the day do the headaches start? No particular time of day  How long do the headaches last? At least 4-6 hours if severe enough  Are you ever headache free? Yes    Aura? with aura - colors and bright lights in the vision either prior or during the headache     Where is your headache located and pain quality? Left parietal or occipital  Light pressure  What is the intensity of pain? Worst 10/10, Average: 4-5/10  Associated symptoms:   [x] Nausea (sometimes)      [x] Vomiting   [x] Photophobia     [x]Phonophobia     [x] Blurred vision   [x] Prefer quiet, dark room  [x] Light-headed or dizzy     [x] Hands or feet tingle or feel numb/paresthesias      Things that make the headache worse? Any movement    Headache triggers: not getting enough sleep, loud sound    Have you seen someone else for headaches or pain? Yes, reports previously being seen by neurology at [address] [place] [address] you had trigger point injection performed and how often? No  Have you had Botox injection performed and how often?  No   Have you had epidural injections or transforaminal injections performed? No  Are you current pregnant or planning on getting pregnant? No  Have you ever had any Brain imaging? yes CTA (reports prior MRI, but unclear when)    Last eye exam: years ago    What medications do you take or have you taken for your headaches?    ABORTIVE:    OTC medications: Advil (2-3 times per week)  Prescription: None    Past/ failed/contraindicated:  OTC medications: Aleve (did not help)  Prescription: None    PREVENTIVE:   Amitriptyline 20mg (on it for years, helps more with sleep), Losartan and Verapamil (HTN)    Past/ failed/contraindicated:  None      LIFESTYLE  Sleep   - averages: about 8 hours  Problems falling asleep?:   No  Problems staying asleep?:  Yes  - Positive history of snoring  - [place] sleepiness scale total: 11    Physical activity: work is demanding    Water: about 6 bottles per day  Caffeine: maybe 1 cup of coffee, but not daily    Mood:  History of anxiety and depression  - Not currently being treated    The following portions of the patient's history were reviewed and updated as appropriate: allergies, current medications, past family history, past medical history, past social history, past surgical history and problem list     Pertinent family history:  Family history of headaches:  migraine headaches in mother, sister and brother  Any family history of aneurysms - Yes - sister    Pertinent social history:  Work: House keeper  Education: High school  Lives by herself    Illicit Drugs: denies  Alcohol/tobacco: Denies alcohol use, Denies tobacco use    Past Medical History:     Past Medical History:   Diagnosis Date   • Anemia    • Aneurysm ([facility] [address] )    • Hypertension    • Migraine        Patient Active Problem List   Diagnosis   • Benign essential hypertension   • Vitamin D deficiency   • Other insomnia   • Morbid obesity with BMI of 40 0-44 9, adult (HCC)   • Cerebral aneurysm, nonruptured       Medications:      Current Outpatient Medications   Medication Sig Dispense Refill   • amitriptyline (ELAVIL) 10 mg tablet Take 2 tablets (20 mg total) by mouth in the morning 180 tablet 1   • chlorthalidone 25 mg tablet Take 0 5 tablets (12 5 mg total) by mouth in the morning 180 tablet 1   • Cholecalciferol (Vitamin D3) 50 MCG (2000 UT) capsule Take 1 capsule (2,000 Units total) by mouth every morning 90 capsule 3   • diclofenac (VOLTAREN) 75 mg EC tablet Take 1 tablet (75 mg total) by mouth 2 (two) times a day as needed (knee pain) 60 tablet 1   • losartan (COZAAR) 100 MG tablet Take 1 tablet (100 mg total) by mouth in the morning 90 tablet 0   • omeprazole (PriLOSEC) 40 MG capsule Take 1 capsule (40 mg total) by mouth daily before breakfast 30 capsule 3   • polyethylene glycol (GLYCOLAX) 17 GM/SCOOP powder Take 17 g by mouth daily (Patient taking differently: Take 17 g by mouth daily as needed) 255 g 0   • verapamil (VERELAN PM) 120 MG 24 hr capsule Take 1 capsule (120 mg total) by mouth in the morning 90 capsule 3     No current facility-administered medications for this visit          Allergies:    No Known Allergies    Family History:     Family History   Problem Relation Age of Onset   • Migraines Sister    • Aneurysm Sister    • No Known Problems Maternal Grandmother    • No Known Problems Maternal Grandfather    • No Known Problems Paternal Grandmother    • No Known Problems Paternal Grandfather    • Breast cancer Maternal Aunt        Social History:       Social History     Socioeconomic History   • Marital status: Legally      Spouse name: Not on file   • Number of children: Not on file   • Years of education: Not on file   • Highest education level: Not on file   Occupational History   • Not on file   Tobacco Use   • Smoking status: Never   • Smokeless tobacco: Never   Vaping Use   • Vaping Use: Never used   Substance and Sexual Activity   • Alcohol use: Never   • Drug use: Never   • Sexual activity: Not Currently     Partners: Male Other Topics Concern   • Not on file   Social History Narrative   • Not on file     Social Determinants of Health     Financial Resource Strain: Not on file   Food Insecurity: Not on file   Transportation Needs: Not on file   Physical Activity: Not on file   Stress: Not on file   Social Connections: Not on file   Intimate Partner Violence: Not on file   Housing Stability: Not on file         Objective:   Physical Exam:                                                                 Vitals:            Constitutional:    /90 (BP Location: Right arm, Patient Position: Sitting, Cuff Size: Adult)   Pulse 90   Wt 121 kg (266 lb)   LMP  (LMP Unknown)   BMI 43 59 kg/m²   BP Readings from Last 3 Encounters:   01/12/23 142/90   01/11/23 120/83   01/06/23 134/74     Pulse Readings from Last 3 Encounters:   01/12/23 90   01/11/23 91   01/06/23 75         Well developed, well nourished, well groomed  No dysmorphic features  HEENT:  Normocephalic atraumatic  Oropharynx is clear and moist  No oral mucosal lesions  Chest:  Respirations regular and unlabored  Cardiovascular:  Distal extremities warm without palpable edema or tenderness, no observed significant swelling  Musculoskeletal:  (see below under neurologic exam for evaluation of motor function and gait)   Skin:  warm and dry, not diaphoretic  No apparent birthmarks or stigmata of neurocutaneous disease  Psychiatric:  Normal behavior and appropriate affect       Neurological Examination:     Mental status/cognitive function:   Orientated to time, place and person  Recent and remote memory intact  Attention span and concentration as well as fund of knowledge are appropriate for age  Normal language and spontaneous speech  Cranial Nerves:  II-visual fields full  Fundi poorly visualized due to pupillary constriction  III, IV, VI-Pupils were equal, round, and reactive to light and accomodation   Extraocular movements were full and conjugate without nystagmus  Conjugate gaze, normal smooth pursuits, normal saccades   V-facial sensation symmetric  VII-facial expression symmetric, intact forehead wrinkle, strong eye closure, symmetric smile    VIII-hearing grossly intact bilaterally   IX, X-palate elevation symmetric, no dysarthria  XI-shoulder shrug strength intact    XII-tongue protrusion midline  Motor Exam: symmetric bulk and tone throughout, no pronator drift  Power/strength 5/5 bilateral upper and lower extremities, no atrophy, fasciculations or abnormal movements noted  Sensory: grossly intact light touch in all extremities  Reflexes: brachioradialis 2+, biceps 2+, knee 2+, ankle 2+ bilaterally  No ankle clonus  Coordination: Finger nose finger intact bilaterally, no apparent dysmetria, ataxia or tremor noted  Gait: steady casual and tandem gait  Pertinent lab results: None     Pertinent Imaging:   -CTA head with and without contrast 10/25/2022: Postsurgical changes with left MCA aneurysm clip  No acute findings  Limited evaluation for residual/recurrent aneurysm  No stenosis  I have personally reviewed imaging and radiology read  Review of Systems:   Constitutional: Negative  Negative for appetite change and fever  HENT: Negative  Negative for hearing loss, tinnitus, trouble swallowing and voice change  Eyes: Positive for visual disturbance  Negative for photophobia and pain  Respiratory: Negative  Negative for shortness of breath  Cardiovascular: Negative  Negative for palpitations  Gastrointestinal: Negative  Negative for nausea and vomiting  Endocrine: Negative  Negative for cold intolerance  Genitourinary: Negative  Negative for dysuria, frequency and urgency  Musculoskeletal: Positive for gait problem (fall, off balance)  Negative for myalgias and neck pain  Skin: Negative  Negative for rash  Allergic/Immunologic: Negative  Neurological: Positive for light-headedness and headaches  Negative for dizziness, tremors, seizures, syncope, facial asymmetry, speech difficulty, weakness and numbness  Hematological: Negative  Does not bruise/bleed easily  Psychiatric/Behavioral: Negative  Negative for confusion, hallucinations and sleep disturbance  All other systems reviewed and are negative  I have spent 25 minutes with the patient today in which greater than 50% of this time was spent in counseling/coordination of care regarding Diagnostic results, Prognosis, Risks and benefits of tx options and Impressions  I also spent 20 minutes non face to face for this patient the same day       Activity Minutes   Precharting/reviewing 10   Patient care/counseling 25   Postcharting/care coordination 10       Author:  Marianela Cameron DO 1/12/2023 8:59 AM

## 2023-01-12 NOTE — PATIENT INSTRUCTIONS
Headache Calendar  Please maintain a headache calendar  Consider using phone applications such as Migraine Carlos Manuel or [place] Migraine Tracker    Headache/migraine treatment:   Acute medications (for immediate treatment of a headache): It is ok to take ibuprofen, acetaminophen or naproxen (Advil, Tylenol,  Aleve, Excedrin) if they help your headaches you should limit these to No more than 2-3 times a week to avoid medication overuse/rebound headaches  Prescription preventive medications for headaches/migraines   (to take every day to help prevent headaches - not to take at the time of headache):  [x] Amitriptyline  - Increase to 30mg at bedtime for 2 weeks  - Then increase to 40mg at bedtime going forward  - If you feel that 30mg is working well for you, you can stay there    *Typically these types of medications take time until you see the benefit, although some may see improvement in days, often it may take weeks, especially if the medication is being titrated up to a beneficial level  Please contact us if there are any concerns or questions regarding the medication  Lifestyle Recommendations:  [x] SLEEP - Maintain a regular sleep schedule: Adults need at least 7-8 hours of uninterrupted a night  Maintain good sleep hygiene:  Going to bed and waking up at consistent times, avoiding excessive daytime naps, avoiding caffeinated beverages in the evening, avoid excessive stimulation in the evening and generally using bed primarily for sleeping  One hour before bedtime would recommend turning lights down lower, decreasing your activity (may read quietly, listen to music at a low volume)  When you get into bed, should eliminate all technology (no texting, emailing, playing with your phone, iPad or tablet in bed)  [x] HYDRATION - Maintain good hydration  Drink  2L of fluid a day (4 typical small water bottles)  [x] DIET - Maintain good nutrition   In particular don't skip meals and try and eat healthy balanced meals regularly  [x] TRIGGERS - Look for other triggers and avoid them: Limit caffeine to 1-2 cups a day or less  Avoid dietary triggers that you have noticed bring on your headaches (this could include aged cheese, peanuts, MSG, aspartame and nitrates)  [x] EXERCISE - physical exercise as we all know is good for you in many ways, and not only is good for your heart, but also is beneficial for your mental health, cognitive health and  chronic pain/headaches  I would encourage at the least 5 days of physical exercise weekly for at least 30 minutes  Education and Follow-up  [x] Please call with any questions or concerns  Of course if any new concerning symptoms go to the emergency department    [x] Follow up in 4 months

## 2023-01-12 NOTE — PROGRESS NOTES
Review of Systems   Constitutional: Negative  Negative for appetite change and fever  HENT: Negative  Negative for hearing loss, tinnitus, trouble swallowing and voice change  Eyes: Positive for visual disturbance  Negative for photophobia and pain  Respiratory: Negative  Negative for shortness of breath  Cardiovascular: Negative  Negative for palpitations  Gastrointestinal: Negative  Negative for nausea and vomiting  Endocrine: Negative  Negative for cold intolerance  Genitourinary: Negative  Negative for dysuria, frequency and urgency  Musculoskeletal: Positive for gait problem (fall, off balance)  Negative for myalgias and neck pain  Skin: Negative  Negative for rash  Allergic/Immunologic: Negative  Neurological: Positive for light-headedness and headaches  Negative for dizziness, tremors, seizures, syncope, facial asymmetry, speech difficulty, weakness and numbness  Hematological: Negative  Does not bruise/bleed easily  Psychiatric/Behavioral: Negative  Negative for confusion, hallucinations and sleep disturbance  All other systems reviewed and are negative

## 2023-01-13 ENCOUNTER — OFFICE VISIT (OUTPATIENT)
Dept: FAMILY MEDICINE CLINIC | Facility: CLINIC | Age: 48
End: 2023-01-13

## 2023-01-13 VITALS
TEMPERATURE: 96.7 F | HEART RATE: 91 BPM | BODY MASS INDEX: 43.39 KG/M2 | SYSTOLIC BLOOD PRESSURE: 122 MMHG | WEIGHT: 270 LBS | DIASTOLIC BLOOD PRESSURE: 90 MMHG | OXYGEN SATURATION: 97 % | HEIGHT: 66 IN

## 2023-01-13 DIAGNOSIS — Z76.89 NEED FOR REFERRAL TO DENTISTRY FOR POOR DENTITION: ICD-10-CM

## 2023-01-13 DIAGNOSIS — G89.29 CHRONIC PAIN OF BOTH KNEES: ICD-10-CM

## 2023-01-13 DIAGNOSIS — M25.561 CHRONIC PAIN OF BOTH KNEES: ICD-10-CM

## 2023-01-13 DIAGNOSIS — I10 BENIGN ESSENTIAL HYPERTENSION: ICD-10-CM

## 2023-01-13 DIAGNOSIS — E11.9 TYPE 2 DIABETES MELLITUS WITHOUT COMPLICATION, WITHOUT LONG-TERM CURRENT USE OF INSULIN (HCC): Primary | ICD-10-CM

## 2023-01-13 DIAGNOSIS — M17.0 PRIMARY OSTEOARTHRITIS OF BOTH KNEES: ICD-10-CM

## 2023-01-13 DIAGNOSIS — E55.9 VITAMIN D DEFICIENCY: ICD-10-CM

## 2023-01-13 DIAGNOSIS — E66.01 MORBID OBESITY WITH BMI OF 40.0-44.9, ADULT (HCC): ICD-10-CM

## 2023-01-13 DIAGNOSIS — M25.562 CHRONIC PAIN OF BOTH KNEES: ICD-10-CM

## 2023-01-13 DIAGNOSIS — K21.9 GASTROESOPHAGEAL REFLUX DISEASE WITHOUT ESOPHAGITIS: ICD-10-CM

## 2023-01-13 DIAGNOSIS — I67.1 CEREBRAL ANEURYSM, NONRUPTURED: ICD-10-CM

## 2023-01-13 RX ORDER — LOSARTAN POTASSIUM 100 MG/1
100 TABLET ORAL DAILY
Qty: 90 TABLET | Refills: 0 | Status: SHIPPED | OUTPATIENT
Start: 2023-01-13

## 2023-01-13 RX ORDER — VERAPAMIL HYDROCHLORIDE 120 MG/1
120 CAPSULE, EXTENDED RELEASE ORAL DAILY
Qty: 90 CAPSULE | Refills: 3 | Status: SHIPPED | OUTPATIENT
Start: 2023-01-13

## 2023-01-13 RX ORDER — CHLORTHALIDONE 25 MG/1
12.5 TABLET ORAL DAILY
Qty: 180 TABLET | Refills: 1 | Status: SHIPPED | OUTPATIENT
Start: 2023-01-13

## 2023-01-13 RX ORDER — OMEPRAZOLE 40 MG/1
40 CAPSULE, DELAYED RELEASE ORAL
Qty: 30 CAPSULE | Refills: 3 | Status: SHIPPED | OUTPATIENT
Start: 2023-01-13

## 2023-01-13 RX ORDER — DICLOFENAC SODIUM 75 MG/1
75 TABLET, DELAYED RELEASE ORAL 2 TIMES DAILY PRN
Qty: 60 TABLET | Refills: 1 | Status: SHIPPED | OUTPATIENT
Start: 2023-01-13

## 2023-01-13 NOTE — PROGRESS NOTES
Name: Stormy Garcia      : 1975      MRN: [id number]  Encounter Provider: Natali Napier PA-C  Encounter Date: 2023   Encounter department: [facility] PRIMARY [facility]ARE    Assessment & Plan     Patient Instructions   Assessment/plan:  1  Type 2 diabetes-diagnosed in October with hemoglobin A1c of 6 6  Recommend reassessing labs including urine microalbumin  She will have diabetic eye screening today  Diabetic foot exam was performed  She would like to hold off on medication therapy and see what her follow-up readings are first   If elevated would recommend starting on metformin therapy  We discussed diet and exercise  She should try to avoid excessive carbohydrates  Encourage plenty of lean meat, chicken, fish, vegetables  Pneumonia vaccine was discussed with patient and deferred at this time  2   Benign essential hypertension-presently stable on chlorthalidone, verapamil and losartan therapy, no medication changes  3   Cerebral aneurysm-stable  Patient with recent imaging, following with neurosurgery  4   Vitamin D deficiency-we will reassess with lab values  5   Morbid obesity-continue with healthy diet and exercise efforts  6   Need for routine dentistry-referral to dentist       1  Type 2 diabetes mellitus without complication, without long-term current use of insulin ([address] )  -     IRIS Diabetic eye exam  -     CBC and differential  -     Comprehensive metabolic panel  -     Hemoglobin A1C  -     Lipid Panel with Direct LDL reflex  -     TSH, 3rd generation with Free T4 reflex  -     Vitamin D 25 hydroxy  -     Microalbumin / creatinine urine ratio    2  Benign essential hypertension  -     chlorthalidone 25 mg tablet; Take 0 5 tablets (12 5 mg total) by mouth in the morning  -     losartan (COZAAR) 100 MG tablet; Take 1 tablet (100 mg total) by mouth in the morning  -     verapamil (VERELAN PM) 120 MG 24 hr capsule;  Take 1 capsule (120 mg total) by mouth in the morning  - CBC and differential  -     Comprehensive metabolic panel  -     Hemoglobin A1C  -     Lipid Panel with Direct LDL reflex  -     TSH, 3rd generation with Free T4 reflex  -     Vitamin D 25 hydroxy  -     Microalbumin / creatinine urine ratio    3  Cerebral aneurysm, nonruptured  -     CBC and differential  -     Comprehensive metabolic panel  -     Hemoglobin A1C  -     Lipid Panel with Direct LDL reflex  -     TSH, 3rd generation with Free T4 reflex  -     Vitamin D 25 hydroxy  -     Microalbumin / creatinine urine ratio    4  Vitamin D deficiency  -     CBC and differential  -     Comprehensive metabolic panel  -     Hemoglobin A1C  -     Lipid Panel with Direct LDL reflex  -     TSH, 3rd generation with Free T4 reflex  -     Vitamin D 25 hydroxy  -     Microalbumin / creatinine urine ratio    5  Morbid obesity with BMI of 40 0-44 9, adult (HCC)  -     CBC and differential  -     Comprehensive metabolic panel  -     Hemoglobin A1C  -     Lipid Panel with Direct LDL reflex  -     TSH, 3rd generation with Free T4 reflex  -     Vitamin D 25 hydroxy  -     Microalbumin / creatinine urine ratio    6  Primary osteoarthritis of both knees  -     diclofenac (VOLTAREN) 75 mg EC tablet; Take 1 tablet (75 mg total) by mouth 2 (two) times a day as needed (knee pain)  -     CBC and differential  -     Comprehensive metabolic panel  -     Hemoglobin A1C  -     Lipid Panel with Direct LDL reflex  -     TSH, 3rd generation with Free T4 reflex  -     Vitamin D 25 hydroxy  -     Microalbumin / creatinine urine ratio    7  Chronic pain of both knees  -     diclofenac (VOLTAREN) 75 mg EC tablet; Take 1 tablet (75 mg total) by mouth 2 (two) times a day as needed (knee pain)    8  Gastroesophageal reflux disease without esophagitis  -     omeprazole (PriLOSEC) 40 MG capsule; Take 1 capsule (40 mg total) by mouth daily before breakfast    9   Need for referral to dentistry for poor dentition  -     Ambulatory Referral to Dentistry; Future  Diabetic Foot Exam    Patient's shoes and socks removed  Right Foot/Ankle   Right Foot Inspection  Skin Exam: skin normal and skin intact  No dry skin, no warmth, no callus, no erythema, no maceration, no abnormal color, no pre-ulcer, no ulcer and no callus  Toe Exam: ROM and strength within normal limits  Sensory   Vibration: intact  Proprioception: intact  Monofilament testing: intact    Vascular  Capillary refills: < 3 seconds  The right DP pulse is 2+  The right PT pulse is 2+  Right Toe  - Comprehensive Exam  Ecchymosis: none  Arch: normal  Hammertoes: absent  Claw Toes: absent  Swelling: none   Tenderness: none         Left Foot/Ankle  Left Foot Inspection  Skin Exam: skin normal and skin intact  No dry skin, no warmth, no erythema, no maceration, normal color, no pre-ulcer, no ulcer and no callus  Toe Exam: ROM and strength within normal limits  Sensory   Vibration: intact  Proprioception: intact  Monofilament testing: intact    Vascular  Capillary refills: < 3 seconds  The left DP pulse is 2+  The left PT pulse is 2+  Left Toe  - Comprehensive Exam  Ecchymosis: none  Arch: normal  Hammertoes: absent  Claw toes: absent  Swelling: none   Tenderness: none           Assign Risk Category  No deformity present  No loss of protective sensation  No weak pulses  Risk: 0    BMI Counseling: Body mass index is 44 25 kg/m²  The BMI is above normal  Nutrition recommendations include decreasing portion sizes  Exercise recommendations include exercising 3-5 times per week  Rationale for BMI follow-up plan is due to patient being overweight or obese  Depression Screening and Follow-up Plan: Patient's depression screening was positive with a PHQ-2 score of 6  Their PHQ-9 score was 10  Depression Screening Follow-up Plan: Patient's depression screening was positive with a PHQ-2 score of 6  Their PHQ-9 score was 10  Clinically patient does not have depression   No treatment is required  Subjective      HPI: This is a 81-year-old female that presents to the office for follow-up of recent blood work that she had in October  Labs do show that she is newly diabetic with hemoglobin A1c of 6 6  She does admit to urinary frequency but she has been having this since she has been on chlorthalidone for her blood pressure  She also has history of brain aneurysm and has started following with [facility] after she had clipping procedure at [facility] in 2018  She has also had arthralgia of the knees and has started following with Dr Gi Mckeon  Patient also mentions that she is looking for somebody for routine dentistry  Review of Systems   Constitutional: Negative for chills, fatigue and fever  HENT: Negative for congestion, ear pain and sinus pressure  Eyes: Negative for visual disturbance  Respiratory: Negative for cough, chest tightness and shortness of breath  Cardiovascular: Negative for chest pain and palpitations  Gastrointestinal: Negative for diarrhea, nausea and vomiting  Endocrine: Negative for polyuria  Genitourinary: Negative for dysuria and frequency  Musculoskeletal: Negative for arthralgias and myalgias  Skin: Negative for pallor and rash  Neurological: Negative for dizziness, weakness, light-headedness, numbness and headaches  Psychiatric/Behavioral: Negative for agitation, behavioral problems and sleep disturbance  All other systems reviewed and are negative  Current Outpatient Medications on File Prior to Visit   Medication Sig   • amitriptyline (ELAVIL) 10 mg tablet Take 3 tablets (30 mg total) by mouth in the morning for 14 days, THEN 4 tablets (40 mg total) in the morning     • Cholecalciferol (Vitamin D3) 50 MCG (2000 UT) capsule Take 1 capsule (2,000 Units total) by mouth every morning   • polyethylene glycol (GLYCOLAX) 17 GM/SCOOP powder Take 17 g by mouth daily (Patient taking differently: Take 17 g by mouth daily as needed)   • [DISCONTINUED] chlorthalidone 25 mg tablet Take 0 5 tablets (12 5 mg total) by mouth in the morning   • [DISCONTINUED] diclofenac (VOLTAREN) 75 mg EC tablet Take 1 tablet (75 mg total) by mouth 2 (two) times a day as needed (knee pain)   • [DISCONTINUED] losartan (COZAAR) 100 MG tablet Take 1 tablet (100 mg total) by mouth in the morning   • [DISCONTINUED] omeprazole (PriLOSEC) 40 MG capsule Take 1 capsule (40 mg total) by mouth daily before breakfast   • [DISCONTINUED] verapamil (VERELAN PM) 120 MG 24 hr capsule Take 1 capsule (120 mg total) by mouth in the morning       Objective     /90 (BP Location: Right arm, Patient Position: Sitting, Cuff Size: Large)   Pulse 91   Temp (!) 96 7 °F (35 9 °C) (Temporal)   Ht 5' 5 5" (1 664 m)   Wt 122 kg (270 lb)   LMP  (LMP Unknown)   SpO2 97%   BMI 44 25 kg/m²     Physical Exam  Vitals and nursing note reviewed  Constitutional:       General: She is not in acute distress  Appearance: She is well-developed  HENT:      Head: Normocephalic and atraumatic  Right Ear: External ear normal       Left Ear: External ear normal       Nose: Nose normal       Mouth/Throat:      Pharynx: No oropharyngeal exudate  Eyes:      Conjunctiva/sclera: Conjunctivae normal       Pupils: Pupils are equal, round, and reactive to light  Neck:      Thyroid: No thyromegaly  Trachea: No tracheal deviation  Cardiovascular:      Rate and Rhythm: Normal rate and regular rhythm  Pulses: no weak pulses          Dorsalis pedis pulses are 2+ on the right side and 2+ on the left side  Posterior tibial pulses are 2+ on the right side and 2+ on the left side  Heart sounds: Normal heart sounds  No murmur heard  No friction rub  Pulmonary:      Effort: Pulmonary effort is normal  No respiratory distress  Breath sounds: Normal breath sounds  No wheezing or rales  Abdominal:      General: Bowel sounds are normal  There is no distension  Palpations: Abdomen is soft  Tenderness: There is no abdominal tenderness  There is no guarding or rebound  Musculoskeletal:         General: No tenderness  Normal range of motion  Cervical back: Normal range of motion and neck supple  Feet:      Right foot:      Skin integrity: No ulcer, skin breakdown, erythema, warmth, callus or dry skin  Left foot:      Skin integrity: No ulcer, skin breakdown, erythema, warmth, callus or dry skin  Lymphadenopathy:      Cervical: No cervical adenopathy  Skin:     General: Skin is warm and dry  Findings: No erythema or rash  Neurological:      Mental Status: She is alert and oriented to person, place, and time  Cranial Nerves: No cranial nerve deficit  Coordination: Coordination normal    Psychiatric:         Behavior: Behavior normal          Thought Content:  Thought content normal        Fam Rob PA-C

## 2023-01-13 NOTE — PATIENT INSTRUCTIONS
Assessment/plan:  1  Type 2 diabetes-diagnosed in October with hemoglobin A1c of 6 6  Recommend reassessing labs including urine microalbumin  She will have diabetic eye screening today  Diabetic foot exam was performed  She would like to hold off on medication therapy and see what her follow-up readings are first   If elevated would recommend starting on metformin therapy  We discussed diet and exercise  She should try to avoid excessive carbohydrates  Encourage plenty of lean meat, chicken, fish, vegetables  Pneumonia vaccine was discussed with patient and deferred at this time  2   Benign essential hypertension-presently stable on chlorthalidone, verapamil and losartan therapy, no medication changes  3   Cerebral aneurysm-stable  Patient with recent imaging, following with neurosurgery  4   Vitamin D deficiency-we will reassess with lab values  5   Morbid obesity-continue with healthy diet and exercise efforts    6   Need for routine dentistry-referral to dentist

## 2023-01-17 LAB
LEFT EYE DIABETIC RETINOPATHY: NORMAL
LEFT EYE IMAGE QUALITY: NORMAL
LEFT EYE MACULAR EDEMA: NORMAL
LEFT EYE OTHER RETINOPATHY: NORMAL
RIGHT EYE DIABETIC RETINOPATHY: NORMAL
RIGHT EYE IMAGE QUALITY: NORMAL
RIGHT EYE MACULAR EDEMA: NORMAL
RIGHT EYE OTHER RETINOPATHY: NORMAL
SEVERITY (EYE EXAM): NORMAL

## 2023-01-21 ENCOUNTER — APPOINTMENT (OUTPATIENT)
Dept: LAB | Facility: HOSPITAL | Age: 48
End: 2023-01-21

## 2023-01-21 LAB
25(OH)D3 SERPL-MCNC: 26.6 NG/ML (ref 30–100)
ALBUMIN SERPL BCP-MCNC: 4.1 G/DL (ref 3.5–5)
ALP SERPL-CCNC: 74 U/L (ref 34–104)
ALT SERPL W P-5'-P-CCNC: 17 U/L (ref 7–52)
ANION GAP SERPL CALCULATED.3IONS-SCNC: 7 MMOL/L (ref 4–13)
AST SERPL W P-5'-P-CCNC: 13 U/L (ref 13–39)
BASOPHILS # BLD AUTO: 0.03 THOUSANDS/ÂΜL (ref 0–0.1)
BASOPHILS NFR BLD AUTO: 0 % (ref 0–1)
BILIRUB SERPL-MCNC: 0.75 MG/DL (ref 0.2–1)
BUN SERPL-MCNC: 11 MG/DL (ref 5–25)
CALCIUM SERPL-MCNC: 9.3 MG/DL (ref 8.4–10.2)
CHLORIDE SERPL-SCNC: 96 MMOL/L (ref 96–108)
CHOLEST SERPL-MCNC: 199 MG/DL
CO2 SERPL-SCNC: 31 MMOL/L (ref 21–32)
CREAT SERPL-MCNC: 0.75 MG/DL (ref 0.6–1.3)
CREAT UR-MCNC: 24.8 MG/DL
EOSINOPHIL # BLD AUTO: 0.09 THOUSAND/ÂΜL (ref 0–0.61)
EOSINOPHIL NFR BLD AUTO: 1 % (ref 0–6)
ERYTHROCYTE [DISTWIDTH] IN BLOOD BY AUTOMATED COUNT: 16.3 % (ref 11.6–15.1)
EST. AVERAGE GLUCOSE BLD GHB EST-MCNC: 143 MG/DL
GFR SERPL CREATININE-BSD FRML MDRD: 95 ML/MIN/1.73SQ M
GLUCOSE P FAST SERPL-MCNC: 94 MG/DL (ref 65–99)
HBA1C MFR BLD: 6.6 %
HCT VFR BLD AUTO: 39 % (ref 34.8–46.1)
HDLC SERPL-MCNC: 59 MG/DL
HGB BLD-MCNC: 12.1 G/DL (ref 11.5–15.4)
IMM GRANULOCYTES # BLD AUTO: 0.1 THOUSAND/UL (ref 0–0.2)
IMM GRANULOCYTES NFR BLD AUTO: 1 % (ref 0–2)
LDLC SERPL CALC-MCNC: 124 MG/DL (ref 0–100)
LYMPHOCYTES # BLD AUTO: 2.94 THOUSANDS/ÂΜL (ref 0.6–4.47)
LYMPHOCYTES NFR BLD AUTO: 24 % (ref 14–44)
MCH RBC QN AUTO: 23.4 PG (ref 26.8–34.3)
MCHC RBC AUTO-ENTMCNC: 31 G/DL (ref 31.4–37.4)
MCV RBC AUTO: 76 FL (ref 82–98)
MICROALBUMIN UR-MCNC: 6.4 MG/L (ref 0–20)
MICROALBUMIN/CREAT 24H UR: 26 MG/G CREATININE (ref 0–30)
MONOCYTES # BLD AUTO: 0.93 THOUSAND/ÂΜL (ref 0.17–1.22)
MONOCYTES NFR BLD AUTO: 8 % (ref 4–12)
NEUTROPHILS # BLD AUTO: 8.38 THOUSANDS/ÂΜL (ref 1.85–7.62)
NEUTS SEG NFR BLD AUTO: 66 % (ref 43–75)
NRBC BLD AUTO-RTO: 0 /100 WBCS
PLATELET # BLD AUTO: 367 THOUSANDS/UL (ref 149–390)
PMV BLD AUTO: 10 FL (ref 8.9–12.7)
POTASSIUM SERPL-SCNC: 4 MMOL/L (ref 3.5–5.3)
PROT SERPL-MCNC: 7.5 G/DL (ref 6.4–8.4)
RBC # BLD AUTO: 5.16 MILLION/UL (ref 3.81–5.12)
SODIUM SERPL-SCNC: 134 MMOL/L (ref 135–147)
TRIGL SERPL-MCNC: 78 MG/DL
TSH SERPL DL<=0.05 MIU/L-ACNC: 1.93 UIU/ML (ref 0.45–4.5)
WBC # BLD AUTO: 12.47 THOUSAND/UL (ref 4.31–10.16)

## 2023-01-23 ENCOUNTER — OFFICE VISIT (OUTPATIENT)
Dept: NEUROSURGERY | Facility: CLINIC | Age: 48
End: 2023-01-23

## 2023-01-23 VITALS
HEART RATE: 80 BPM | BODY MASS INDEX: 43.55 KG/M2 | DIASTOLIC BLOOD PRESSURE: 82 MMHG | TEMPERATURE: 97.2 F | WEIGHT: 271 LBS | HEIGHT: 66 IN | SYSTOLIC BLOOD PRESSURE: 130 MMHG

## 2023-01-23 DIAGNOSIS — I67.1 CEREBRAL ANEURYSM, NONRUPTURED: Primary | ICD-10-CM

## 2023-01-23 NOTE — PROGRESS NOTES
Patient Id: [place] is a 56 y o  female        Handedness: Right      Assessment/Plan:    Diagnoses and all orders for this visit:    Cerebral aneurysm, nonruptured  -     CTA head w wo contrast; Future        Discussion Summary:   1  Previously clipped left MCA aneurysm, 2018  Now status post formal arteriogram approximately 2 weeks ago  This demonstrated small recurrence at the origin of the clipping as well as another small M2 aneurysm  Both of these measure 2 to 3 mm and are very small  This juncture I would not recommend further treatment but continued observation  We will plan for CT in 1 year  We can consider repeat formal arteriogram in another 5 years as long as CTA appears stable during this duration  We did discuss her risk of hemorrhage from these aneurysms based on the size and location  2   Family history of aneurysm and subarachnoid hemorrhage  No children to screen  Chief Complaint: Follow-up        HPI:  This is a very pleasant 46-year-old female who had a left MCA aneurysm clipped by Dr Nancy Oliver at [facility] in 2018 and has transferred her care here for evaluation  She has not had any new neurosurgical or neurologic complaints  Last CTA was concerning for possible residual aneurysm or recurrence  She is now 2 weeks status post her formal arteriogram   She had no complications from this and denies any new complaints      Her past medical history is significant for hypertension  Her past surgical history is significant for hysterectomy and craniotomy for clipping        She is not allergic to any medications      She is   She has no children  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  He denies any history of tobacco or alcohol abuse      Her sister has an aneurysm as well  It is untreated  On aware of how her parents passed away  Review of systems obtained by the MA reviewed and updated below      Review of Systems   HENT: Negative for tinnitus  Eyes: Negative for visual disturbance  Respiratory: Negative for shortness of breath  Cardiovascular: Negative for chest pain  Gastrointestinal: Negative  Genitourinary: Negative  Musculoskeletal: Negative for back pain, gait problem, myalgias and neck pain  Neurological: Positive for weakness (knee issues )  Negative for dizziness, tremors, seizures, speech difficulty, light-headedness, numbness and headaches  Physical Exam  Vitals:    01/23/23 1247   BP: 130/82   Pulse: 80   Temp: (!) 97 2 °F (36 2 °C)     She is well appearing  Affect is appropriate  Body mass index is 44 41 kg/m²  Gwenette Carrier She is awake alert and oriented  Hearing and vision are grossly intact  Her pupils are equal round reactive to light  Her extraocular movements are intact  Her face is symmetric  Tongue is midline  Facial sensation is intact and symmetric throughout  Shoulder shrug is 5/5  There is no drift or dysmetria  She has full strength in her bilateral upper and lower extremities  She has normal muscle tone muscle bulk  Her biceps reflexes and patellar reflexes are 2+ and symmetric  Cami sign negative bilaterally  Sensation intact to light touch and pinprick throughout  Her gait is normal      Her heart rate is regular  Normal respiratory effort  Abdomen nondistended  Radial pulses 2+        The following portions of the patient's history were reviewed and updated as appropriate: allergies, current medications, past family history, past medical history, past social history, past surgical history and problem list     Active Ambulatory Problems     Diagnosis Date Noted   • Benign essential hypertension 03/18/2022   • Vitamin D deficiency 03/18/2022   • Other insomnia 03/18/2022   • Morbid obesity with BMI of 40 0-44 9, adult ([facility] ) 03/18/2022   • Cerebral aneurysm, nonruptured 11/29/2022   • Type 2 diabetes mellitus without complication, without long-term current use of insulin ([facility] ) 01/13/2023     Resolved Ambulatory Problems     Diagnosis Date Noted   • No Resolved Ambulatory Problems     Past Medical History:   Diagnosis Date   • Anemia    • Aneurysm ([facility] )    • Hypertension    • Migraine        Past Surgical History:   Procedure Laterality Date   • BREAST CYST EXCISION Right 2021   • HYSTERECTOMY     • IR CEREBRAL ANGIOGRAPHY  1/6/2023         Current Outpatient Medications:   •  amitriptyline (ELAVIL) 10 mg tablet, Take 3 tablets (30 mg total) by mouth in the morning for 14 days, THEN 4 tablets (40 mg total) in the morning , Disp: 180 tablet, Rfl: 2  •  chlorthalidone 25 mg tablet, Take 0 5 tablets (12 5 mg total) by mouth in the morning, Disp: 180 tablet, Rfl: 1  •  Cholecalciferol (Vitamin D3) 50 MCG (2000 UT) capsule, Take 1 capsule (2,000 Units total) by mouth every morning, Disp: 90 capsule, Rfl: 3  •  losartan (COZAAR) 100 MG tablet, Take 1 tablet (100 mg total) by mouth in the morning, Disp: 90 tablet, Rfl: 0  •  omeprazole (PriLOSEC) 40 MG capsule, Take 1 capsule (40 mg total) by mouth daily before breakfast, Disp: 30 capsule, Rfl: 3  •  polyethylene glycol (GLYCOLAX) 17 GM/SCOOP powder, Take 17 g by mouth daily (Patient taking differently: Take 17 g by mouth daily as needed), Disp: 255 g, Rfl: 0  •  verapamil (VERELAN PM) 120 MG 24 hr capsule, Take 1 capsule (120 mg total) by mouth in the morning, Disp: 90 capsule, Rfl: 3  •  diclofenac (VOLTAREN) 75 mg EC tablet, Take 1 tablet (75 mg total) by mouth 2 (two) times a day as needed (knee pain) (Patient not taking: Reported on 1/23/2023), Disp: 60 tablet, Rfl: 1    Results/Data: We reviewed her imaging in detail as well as report  See below

## 2023-02-13 RX ORDER — LIDOCAINE HYDROCHLORIDE 10 MG/ML
0.5 INJECTION, SOLUTION EPIDURAL; INFILTRATION; INTRACAUDAL; PERINEURAL ONCE AS NEEDED
Status: CANCELLED | OUTPATIENT
Start: 2023-02-13

## 2023-02-13 RX ORDER — SODIUM CHLORIDE, SODIUM LACTATE, POTASSIUM CHLORIDE, CALCIUM CHLORIDE 600; 310; 30; 20 MG/100ML; MG/100ML; MG/100ML; MG/100ML
125 INJECTION, SOLUTION INTRAVENOUS CONTINUOUS
Status: CANCELLED | OUTPATIENT
Start: 2023-02-13

## 2023-02-14 ENCOUNTER — ANESTHESIA (OUTPATIENT)
Dept: GASTROENTEROLOGY | Facility: HOSPITAL | Age: 48
End: 2023-02-14

## 2023-02-14 ENCOUNTER — ANESTHESIA EVENT (OUTPATIENT)
Dept: GASTROENTEROLOGY | Facility: HOSPITAL | Age: 48
End: 2023-02-14

## 2023-02-14 ENCOUNTER — HOSPITAL ENCOUNTER (OUTPATIENT)
Dept: GASTROENTEROLOGY | Facility: HOSPITAL | Age: 48
Setting detail: OUTPATIENT SURGERY
Discharge: HOME | End: 2023-02-14
Attending: INTERNAL MEDICINE

## 2023-02-14 VITALS
OXYGEN SATURATION: 95 % | RESPIRATION RATE: 18 BRPM | HEART RATE: 68 BPM | SYSTOLIC BLOOD PRESSURE: 141 MMHG | TEMPERATURE: 96.9 F | DIASTOLIC BLOOD PRESSURE: 85 MMHG | HEIGHT: 66 IN | WEIGHT: 271 LBS | BODY MASS INDEX: 43.55 KG/M2

## 2023-02-14 DIAGNOSIS — K21.9 GASTROESOPHAGEAL REFLUX DISEASE WITHOUT ESOPHAGITIS: ICD-10-CM

## 2023-02-14 DIAGNOSIS — Z12.11 SCREENING FOR COLON CANCER: ICD-10-CM

## 2023-02-14 RX ORDER — PROPOFOL 10 MG/ML
INJECTION, EMULSION INTRAVENOUS AS NEEDED
Status: DISCONTINUED | OUTPATIENT
Start: 2023-02-14 | End: 2023-02-14

## 2023-02-14 RX ORDER — LIDOCAINE HYDROCHLORIDE 10 MG/ML
0.5 INJECTION, SOLUTION EPIDURAL; INFILTRATION; INTRACAUDAL; PERINEURAL ONCE AS NEEDED
Status: DISCONTINUED | OUTPATIENT
Start: 2023-02-14 | End: 2023-02-18 | Stop reason: HOSPADM

## 2023-02-14 RX ORDER — SODIUM CHLORIDE, SODIUM LACTATE, POTASSIUM CHLORIDE, CALCIUM CHLORIDE 600; 310; 30; 20 MG/100ML; MG/100ML; MG/100ML; MG/100ML
125 INJECTION, SOLUTION INTRAVENOUS CONTINUOUS
Status: DISCONTINUED | OUTPATIENT
Start: 2023-02-14 | End: 2023-02-18 | Stop reason: HOSPADM

## 2023-02-14 RX ADMIN — PROPOFOL 50 MG: 10 INJECTION, EMULSION INTRAVENOUS at 13:37

## 2023-02-14 RX ADMIN — PROPOFOL 50 MG: 10 INJECTION, EMULSION INTRAVENOUS at 13:30

## 2023-02-14 RX ADMIN — PROPOFOL 50 MG: 10 INJECTION, EMULSION INTRAVENOUS at 13:32

## 2023-02-14 RX ADMIN — SODIUM CHLORIDE, SODIUM LACTATE, POTASSIUM CHLORIDE, AND CALCIUM CHLORIDE 125 ML/HR: .6; .31; .03; .02 INJECTION, SOLUTION INTRAVENOUS at 11:03

## 2023-02-14 RX ADMIN — PROPOFOL 50 MG: 10 INJECTION, EMULSION INTRAVENOUS at 13:20

## 2023-02-14 RX ADMIN — PROPOFOL 50 MG: 10 INJECTION, EMULSION INTRAVENOUS at 13:34

## 2023-02-14 RX ADMIN — PROPOFOL 50 MG: 10 INJECTION, EMULSION INTRAVENOUS at 13:19

## 2023-02-14 RX ADMIN — PROPOFOL 50 MG: 10 INJECTION, EMULSION INTRAVENOUS at 13:26

## 2023-02-14 RX ADMIN — SIMETHICONE 40 MG: 20 EMULSION ORAL at 13:36

## 2023-02-14 RX ADMIN — PROPOFOL 50 MG: 10 INJECTION, EMULSION INTRAVENOUS at 13:41

## 2023-02-14 RX ADMIN — PROPOFOL 50 MG: 10 INJECTION, EMULSION INTRAVENOUS at 13:23

## 2023-02-14 NOTE — ANESTHESIA POSTPROCEDURE EVALUATION
Post-Op Assessment Note    CV Status:  Stable  Pain Score: 0    Pain management: adequate     Mental Status:  Alert   Hydration Status:  Stable   PONV Controlled:  Controlled   Airway Patency:  Patent      Post Op Vitals Reviewed: Yes      Staff: CRNA         No notable events documented      BP   131/66   Temp     Pulse  78   Resp   20   SpO2   98

## 2023-02-14 NOTE — ANESTHESIA PREPROCEDURE EVALUATION
Procedure:  EGD  COLONOSCOPY    Relevant Problems   CARDIO   (+) Benign essential hypertension      ENDO   (+) Type 2 diabetes mellitus without complication, without long-term current use of insulin (HCC)      Other   (+) Morbid obesity with BMI of 40 0-44 9, adult [facility])        Physical Exam    Airway    Mallampati score: III  TM Distance: >3 FB  Neck ROM: full     Dental   upper dentures and lower dentures,     Cardiovascular  Rhythm: regular, Rate: normal, Cardiovascular exam normal    Pulmonary  Pulmonary exam normal Breath sounds clear to auscultation,     Other Findings        Anesthesia Plan  ASA Score- 3     Anesthesia Type- IV sedation with anesthesia with ASA Monitors  Additional Monitors:   Airway Plan:     Comment: Discussed risks/benefits, including medication reactions, awareness, aspiration, and serious/life threatening complications  Plan to maintain native airway with IVGA, monitored with EtCO2  Plan Factors-Exercise tolerance (METS): >4 METS  Patient summary reviewed  Patient instructed to abstain from smoking on day of procedure  Patient did not smoke on day of surgery  Induction- intravenous  Postoperative Plan-     Informed Consent- Anesthetic plan and risks discussed with patient  I personally reviewed this patient with the CRNA  Discussed and agreed on the Anesthesia Plan with the CRNA  Thomasina Cooks

## 2023-02-14 NOTE — H&P
History and Physical - [facility]  Dorothy Fortune 56 y o  female MRN: [id number]                  HPI: Dorothy Fortune is a 56y o  year old female who presents for screening colonoscopy and EGD for Monsalve's screening  REVIEW OF SYSTEMS: Per the HPI, and otherwise unremarkable      Historical Information   Past Medical History:   Diagnosis Date   • Anemia    • Aneurysm ([address] )    • Hypertension    • Migraine      Past Surgical History:   Procedure Laterality Date   • BREAST CYST EXCISION Right 2021   • HYSTERECTOMY     • IR CEREBRAL ANGIOGRAPHY  1/6/2023     Social History   Social History     Substance and Sexual Activity   Alcohol Use Never     Social History     Substance and Sexual Activity   Drug Use Never     Social History     Tobacco Use   Smoking Status Never   Smokeless Tobacco Never     Family History   Problem Relation Age of Onset   • Migraines Sister    • Aneurysm Sister    • No Known Problems Maternal Grandmother    • No Known Problems Maternal Grandfather    • No Known Problems Paternal Grandmother    • No Known Problems Paternal Grandfather    • Breast cancer Maternal Aunt        Meds/Allergies       Current Outpatient Medications:   •  amitriptyline (ELAVIL) 10 mg tablet  •  chlorthalidone 25 mg tablet  •  Cholecalciferol (Vitamin D3) 50 MCG (2000 UT) capsule  •  diclofenac (VOLTAREN) 75 mg EC tablet  •  losartan (COZAAR) 100 MG tablet  •  omeprazole (PriLOSEC) 40 MG capsule  •  verapamil (VERELAN PM) 120 MG 24 hr capsule  •  polyethylene glycol (GLYCOLAX) 17 GM/SCOOP powder    Current Facility-Administered Medications:   •  lactated ringers infusion, 125 mL/hr, Intravenous, Continuous, Restarted at 02/14/23 1156  •  lidocaine (PF) (XYLOCAINE-MPF) 1 % injection 0 5 mL, 0 5 mL, Infiltration, Once PRN    No Known Allergies    Objective     /85 (BP Location: Left arm)   Pulse 74   Temp (!) 96 6 °F (35 9 °C) (Temporal)   Resp 18   Ht 5' 5 5" (1 664 m)   Wt 123 kg (271 lb)   LMP  (LMP Unknown)   SpO2 96%   BMI 44 41 kg/m²       PHYSICAL EXAM    Gen: NAD  Head: NCAT  CV: RRR  CHEST: Clear  ABD: soft, NT/ND  EXT: no edema      ASSESSMENT/PLAN:  This is a 56y o  year old female here for EGD and colonoscopy, and she is stable and optimized for her procedure

## 2023-02-17 ENCOUNTER — TELEPHONE (OUTPATIENT)
Dept: GASTROENTEROLOGY | Facility: MEDICAL CENTER | Age: 48
End: 2023-02-17

## 2023-02-17 DIAGNOSIS — A04.8 H. PYLORI INFECTION: Primary | ICD-10-CM

## 2023-02-17 RX ORDER — OMEPRAZOLE 40 MG/1
40 CAPSULE, DELAYED RELEASE ORAL
Qty: 28 CAPSULE | Refills: 0 | Status: SHIPPED | OUTPATIENT
Start: 2023-02-17 | End: 2023-03-03

## 2023-02-17 RX ORDER — METRONIDAZOLE 250 MG/1
250 TABLET ORAL EVERY 6 HOURS
Qty: 56 TABLET | Refills: 0 | Status: SHIPPED | OUTPATIENT
Start: 2023-02-17 | End: 2023-03-03

## 2023-02-17 RX ORDER — TETRACYCLINE HYDROCHLORIDE 500 MG/1
500 CAPSULE ORAL EVERY 6 HOURS
Qty: 56 CAPSULE | Refills: 0 | Status: SHIPPED | OUTPATIENT
Start: 2023-02-17 | End: 2023-03-03

## 2023-02-17 RX ORDER — BISMUTH SUBSALICYLATE 262 MG/1
262 TABLET, CHEWABLE ORAL EVERY 6 HOURS
Qty: 30 TABLET | Refills: 4 | Status: SHIPPED | OUTPATIENT
Start: 2023-02-17 | End: 2023-03-03

## 2023-02-17 NOTE — RESULT ENCOUNTER NOTE
Please call the patient with the results    Stomach biopsy shows infection with H pylori  This is a bacteria that can live in the stomach and cause abdominal pain and ulcers  Please send H  pylori treatment per protocol    The  colon polyp removed was called an adenoma  This is a pre-cancerous lesion and was completely removed  There was no evidence of cancer in the polyp       She should have the colonoscopy repeated in 5 years due to a history of colon polyps    She is scheduled for office follow up in March

## 2023-02-17 NOTE — TELEPHONE ENCOUNTER
----- Message from Storm Carmona MD sent at 2/17/2023 10:06 AM EST -----  Please call the patient with the results    Stomach biopsy shows infection with H pylori  This is a bacteria that can live in the stomach and cause abdominal pain and ulcers  Please send H  pylori treatment per protocol    The  colon polyp removed was called an adenoma  This is a pre-cancerous lesion and was completely removed  There was no evidence of cancer in the polyp       She should have the colonoscopy repeated in 5 years due to a history of colon polyps    She is scheduled for office follow up in March

## 2023-03-02 ENCOUNTER — OFFICE VISIT (OUTPATIENT)
Dept: GASTROENTEROLOGY | Facility: MEDICAL CENTER | Age: 48
End: 2023-03-02

## 2023-03-02 VITALS
BODY MASS INDEX: 46.28 KG/M2 | TEMPERATURE: 97.6 F | HEART RATE: 91 BPM | DIASTOLIC BLOOD PRESSURE: 87 MMHG | WEIGHT: 282.4 LBS | SYSTOLIC BLOOD PRESSURE: 136 MMHG

## 2023-03-02 DIAGNOSIS — K59.09 OTHER CONSTIPATION: ICD-10-CM

## 2023-03-02 DIAGNOSIS — K21.9 GASTROESOPHAGEAL REFLUX DISEASE WITHOUT ESOPHAGITIS: ICD-10-CM

## 2023-03-02 DIAGNOSIS — A04.8 H. PYLORI INFECTION: Primary | ICD-10-CM

## 2023-03-02 NOTE — PROGRESS NOTES
Assessment/Plan:     Diagnoses and all orders for this visit:    H  pylori infection  Gastroesophageal reflux disease without esophagitis  Patient previously complaining of GERD, underwent endoscopy and was found to have H  pylori  She is currently on quadruple therapy  She has been having more nausea and bloating likely from the medications  I did encourage her to finish the medications that she only has a few days left  She will then need to wait at least 2 weeks off PPI therapy and get stool testing to confirm eradication  Other constipation  She was previously complaining of constipation, states currently this has resolved  She does mention that if she feels a little constipated she will eat ice cream, drink milk or eat yams which seem to help  We will see her back in 3 months or sooner if necessary  Subjective:      Patient ID: Rosalind Tovar is a 56 y o  female  HPI     This is a follow-up for GERD, constipation and to discuss her endoscopy and colonoscopy  She was previously complaining of intermittent heartburn symptoms was using omeprazole as needed, recommended to be taken on a daily basis  Underwent endoscopy that overall appeared within normal limits however biopsies did show H  pylori  She was prescribed quadruple therapy which she is still currently taking  She states she has been experiencing nausea and bloating, making it difficult to take the medications  She denies significant constipation at this time & is not using anything for her bowels  She states if she eats ice cream drinks milk or eats EMs she is able to have a bowel movement  Colonoscopy did show 2 small tubular adenomatous polyps, diverticuli and hemorrhoids  Recommended to be repeated in 5 years time      Patient Active Problem List   Diagnosis   • Benign essential hypertension   • Vitamin D deficiency   • Other insomnia   • Morbid obesity with BMI of 40 0-44 9, adult ([facility] )   • Cerebral aneurysm, nonruptured   • Type 2 diabetes mellitus without complication, without long-term current use of insulin ([facility])   • H  pylori infection   • Gastroesophageal reflux disease without esophagitis   • Other constipation     No Known Allergies  Current Outpatient Medications on File Prior to Visit   Medication Sig   • bismuth subsalicylate (PEPTO BISMOL) 262 MG chewable tablet Chew 1 tablet (262 mg total) every 6 (six) hours for 14 days   • chlorthalidone 25 mg tablet Take 0 5 tablets (12 5 mg total) by mouth in the morning   • Cholecalciferol (Vitamin D3) 50 MCG (2000 UT) capsule Take 1 capsule (2,000 Units total) by mouth every morning   • diclofenac (VOLTAREN) 75 mg EC tablet Take 1 tablet (75 mg total) by mouth 2 (two) times a day as needed (knee pain)   • losartan (COZAAR) 100 MG tablet Take 1 tablet (100 mg total) by mouth in the morning   • metroNIDAZOLE (FLAGYL) 250 mg tablet Take 1 tablet (250 mg total) by mouth every 6 (six) hours for 14 days   • omeprazole (PriLOSEC) 40 MG capsule Take 1 capsule (40 mg total) by mouth daily before breakfast   • omeprazole (PriLOSEC) 40 MG capsule Take 1 capsule (40 mg total) by mouth 2 (two) times a day before meals for 14 days   • tetracycline (ACHROMYCIN,SUMYCIN) 500 MG capsule Take 1 capsule (500 mg total) by mouth every 6 (six) hours for 14 days   • verapamil (VERELAN PM) 120 MG 24 hr capsule Take 1 capsule (120 mg total) by mouth in the morning   • amitriptyline (ELAVIL) 10 mg tablet Take 3 tablets (30 mg total) by mouth in the morning for 14 days, THEN 4 tablets (40 mg total) in the morning  • polyethylene glycol (GLYCOLAX) 17 GM/SCOOP powder Take 17 g by mouth daily (Patient not taking: Reported on 3/2/2023)     No current facility-administered medications on file prior to visit       Family History   Problem Relation Age of Onset   • Migraines Sister    • Aneurysm Sister    • No Known Problems Maternal Grandmother    • No Known Problems Maternal Grandfather    • No Known Problems Paternal Grandmother    • No Known Problems Paternal Grandfather    • Breast cancer Maternal Aunt      Past Medical History:   Diagnosis Date   • Anemia    • Aneurysm ([address] )    • Hypertension    • Migraine      Social History     Socioeconomic History   • Marital status: Legally      Spouse name: None   • Number of children: None   • Years of education: None   • Highest education level: None   Occupational History   • None   Tobacco Use   • Smoking status: Never   • Smokeless tobacco: Never   Vaping Use   • Vaping Use: Never used   Substance and Sexual Activity   • Alcohol use: Never   • Drug use: Never   • Sexual activity: Not Currently     Partners: Male   Other Topics Concern   • None   Social History Narrative   • None     Social Determinants of Health     Financial Resource Strain: Not on file   Food Insecurity: Not on file   Transportation Needs: Not on file   Physical Activity: Not on file   Stress: Not on file   Social Connections: Not on file   Intimate Partner Violence: Not on file   Housing Stability: Not on file     Past Surgical History:   Procedure Laterality Date   • BREAST CYST EXCISION Right 2021   • HYSTERECTOMY     • IR CEREBRAL ANGIOGRAPHY  1/6/2023         Review of Systems   Constitutional: Negative for fever  Gastrointestinal: Positive for abdominal pain  Negative for constipation, diarrhea, nausea and vomiting  Genitourinary: Negative for hematuria  Musculoskeletal: Negative for arthralgias  Neurological: Negative for headaches  All other systems reviewed and are negative  Objective:      /87   Pulse 91   Temp 97 6 °F (36 4 °C) (Tympanic)   Wt 128 kg (282 lb 6 4 oz)   LMP  (LMP Unknown)   BMI 46 28 kg/m²          Physical Exam  Constitutional:       Appearance: She is well-developed  She is obese  HENT:      Head: Normocephalic and atraumatic     Eyes:      Conjunctiva/sclera: Conjunctivae normal    Cardiovascular:      Rate and Rhythm: Normal rate and regular rhythm  Pulmonary:      Effort: Pulmonary effort is normal       Breath sounds: Normal breath sounds  Abdominal:      General: Bowel sounds are normal  There is no distension  Palpations: Abdomen is soft  Tenderness: There is no abdominal tenderness  Musculoskeletal:         General: Normal range of motion  Cervical back: Normal range of motion  Skin:     General: Skin is warm and dry  Neurological:      Mental Status: She is alert and oriented to person, place, and time     Psychiatric:         Mood and Affect: Mood normal          Behavior: Behavior normal

## 2023-03-22 DIAGNOSIS — G43.109 MIGRAINE WITH AURA AND WITHOUT STATUS MIGRAINOSUS, NOT INTRACTABLE: ICD-10-CM

## 2023-03-22 DIAGNOSIS — F41.9 ANXIETY AND DEPRESSION: ICD-10-CM

## 2023-03-22 DIAGNOSIS — F32.A ANXIETY AND DEPRESSION: ICD-10-CM

## 2023-03-22 DIAGNOSIS — G47.00 INSOMNIA, UNSPECIFIED TYPE: ICD-10-CM

## 2023-03-22 RX ORDER — AMITRIPTYLINE HYDROCHLORIDE 10 MG/1
TABLET, FILM COATED ORAL
Qty: 90 TABLET | Refills: 6 | Status: SHIPPED | OUTPATIENT
Start: 2023-03-22

## 2023-03-27 ENCOUNTER — LAB (OUTPATIENT)
Dept: LAB | Facility: HOSPITAL | Age: 48
End: 2023-03-27

## 2023-03-27 ENCOUNTER — HOSPITAL ENCOUNTER (EMERGENCY)
Facility: HOSPITAL | Age: 48
Discharge: HOME | End: 2023-03-28
Attending: EMERGENCY MEDICINE

## 2023-03-27 VITALS
TEMPERATURE: 98.8 F | DIASTOLIC BLOOD PRESSURE: 76 MMHG | RESPIRATION RATE: 19 BRPM | OXYGEN SATURATION: 96 % | HEART RATE: 76 BPM | SYSTOLIC BLOOD PRESSURE: 125 MMHG

## 2023-03-27 DIAGNOSIS — G43.909 MIGRAINE HEADACHE: Primary | ICD-10-CM

## 2023-03-27 DIAGNOSIS — Z86.79 HISTORY OF CEREBRAL ANEURYSM: ICD-10-CM

## 2023-03-27 DIAGNOSIS — I10 HTN (HYPERTENSION): ICD-10-CM

## 2023-03-27 DIAGNOSIS — A04.8 H. PYLORI INFECTION: ICD-10-CM

## 2023-03-27 RX ORDER — ACETAMINOPHEN 325 MG/1
975 TABLET ORAL ONCE
Status: COMPLETED | OUTPATIENT
Start: 2023-03-27 | End: 2023-03-27

## 2023-03-27 RX ORDER — MAGNESIUM SULFATE 1 G/100ML
1 INJECTION INTRAVENOUS ONCE
Status: COMPLETED | OUTPATIENT
Start: 2023-03-27 | End: 2023-03-27

## 2023-03-27 RX ORDER — KETOROLAC TROMETHAMINE 30 MG/ML
15 INJECTION, SOLUTION INTRAMUSCULAR; INTRAVENOUS ONCE
Status: COMPLETED | OUTPATIENT
Start: 2023-03-27 | End: 2023-03-27

## 2023-03-27 RX ORDER — METOCLOPRAMIDE HYDROCHLORIDE 5 MG/ML
10 INJECTION INTRAMUSCULAR; INTRAVENOUS EVERY 6 HOURS PRN
Status: DISCONTINUED | OUTPATIENT
Start: 2023-03-27 | End: 2023-03-28 | Stop reason: HOSPADM

## 2023-03-27 RX ADMIN — KETOROLAC TROMETHAMINE 15 MG: 30 INJECTION, SOLUTION INTRAMUSCULAR; INTRAVENOUS at 21:34

## 2023-03-27 RX ADMIN — MAGNESIUM SULFATE HEPTAHYDRATE 1 G: 1 INJECTION, SOLUTION INTRAVENOUS at 23:27

## 2023-03-27 RX ADMIN — ACETAMINOPHEN 975 MG: 325 TABLET ORAL at 21:34

## 2023-03-27 RX ADMIN — METOCLOPRAMIDE 10 MG: 5 INJECTION, SOLUTION INTRAMUSCULAR; INTRAVENOUS at 21:34

## 2023-03-28 ENCOUNTER — TELEPHONE (OUTPATIENT)
Dept: OTHER | Facility: OTHER | Age: 48
End: 2023-03-28

## 2023-03-28 DIAGNOSIS — G43.109 MIGRAINE WITH AURA AND WITHOUT STATUS MIGRAINOSUS, NOT INTRACTABLE: Primary | ICD-10-CM

## 2023-03-28 LAB — H PYLORI AG STL QL IA: NEGATIVE

## 2023-03-28 NOTE — TELEPHONE ENCOUNTER
Patient was seen in ED yesterday for migraine  Provider told her to schedule a follow up appointment with neurology  Please callback

## 2023-03-28 NOTE — ED ATTENDING ATTESTATION
3/27/2023  IAnastasiya DO, saw and evaluated the patient  I have discussed the patient with the resident/non-physician practitioner and agree with the resident's/non-physician practitioner's findings, Plan of Care, and MDM as documented in the resident's/non-physician practitioner's note, except where noted  All available labs and Radiology studies were reviewed  I was present for key portions of any procedure(s) performed by the resident/non-physician practitioner and I was immediately available to provide assistance  At this point I agree with the current assessment done in the Emergency Department  I have conducted an independent evaluation of this patient a history and physical is as follows:    55 yo female c/o 1wk hx HA progressive onset  Today had a flushing sensation in her face and some throbbing which went away, but HA persists  No visual changes, neck pain/stiffness, focal weakness/numbness/tingling, f/c/n/v  Has hx of clipped L MCA aneurysm at [address] 2018  Had Earl Osman here in January which was a 2x3mm post clipping dogear along the MCA terminus, and a small 2mm aneurysm superior division of M2  Imp: HA likely migraine plan: MMA, imaging if no improvement        ED Course         Critical Care Time  Procedures

## 2023-03-28 NOTE — ED PROVIDER NOTES
"History  Chief Complaint   Patient presents with   • Headache     Pt is complaining of a headache for about a week with no relief from tylenol, she says her blood pressure has been high at home, her face feels swollen and the right side feels tight      57 y/o female with hx of HTN, migraine headaches, and L MCA aneurysm s/p clipping at [facility] (2018) presents to the ED for evaluation of progressively worsening headache over the last week  She notes her headache was gradual in onset, waxing and waning, and achieved a maximal severity 2 days ago  She describes headache as a throbbing sensation and she took 3 Tylenol 2 days ago with minimal relief  She did not take any medication today for her headache, noting that she tried to [place] with it  \" She also a flushing and warm sensation in her face that went away spontaneously, and she also noticed an elevated blood pressure at home, and these symptoms prompted her to come in for evaluation today  She had a 4vCA in January 2023 which showed a 2 x 3 mm post clipping dogear along the MCA terminus and a small 2 mm aneurysm superior division of M2  She denies any fever, chills, visual changes, cough, dyspnea, chest pain, abdominal pain, nausea, vomiting, diarrhea, back pain, neck pain, lightheadedness, dizziness, numbness, or focal weakness  Prior to Admission Medications   Prescriptions Last Dose Informant Patient Reported? Taking?    Cholecalciferol (Vitamin D3) 50 MCG (2000 UT) capsule   No No   Sig: Take 1 capsule (2,000 Units total) by mouth every morning   amitriptyline (ELAVIL) 10 mg tablet   No No   Sig: Take 3 tabs by mouth daily   chlorthalidone 25 mg tablet   No No   Sig: Take 0 5 tablets (12 5 mg total) by mouth in the morning   diclofenac (VOLTAREN) 75 mg EC tablet   No No   Sig: Take 1 tablet (75 mg total) by mouth 2 (two) times a day as needed (knee pain)   losartan (COZAAR) 100 MG tablet   No No   Sig: Take 1 tablet (100 mg total) by mouth in the " morning   omeprazole (PriLOSEC) 40 MG capsule   No No   Sig: Take 1 capsule (40 mg total) by mouth daily before breakfast   omeprazole (PriLOSEC) 40 MG capsule   No No   Sig: Take 1 capsule (40 mg total) by mouth 2 (two) times a day before meals for 14 days   polyethylene glycol (GLYCOLAX) 17 GM/SCOOP powder   No No   Sig: Take 17 g by mouth daily   Patient not taking: Reported on 3/2/2023   verapamil (VERELAN PM) 120 MG 24 hr capsule   No No   Sig: Take 1 capsule (120 mg total) by mouth in the morning      Facility-Administered Medications: None       Past Medical History:   Diagnosis Date   • Anemia    • Aneurysm (HCC)    • Hypertension    • Migraine        Past Surgical History:   Procedure Laterality Date   • BREAST CYST EXCISION Right 2021   • HYSTERECTOMY     • IR CEREBRAL ANGIOGRAPHY  1/6/2023       Family History   Problem Relation Age of Onset   • Migraines Sister    • Aneurysm Sister    • No Known Problems Maternal Grandmother    • No Known Problems Maternal Grandfather    • No Known Problems Paternal Grandmother    • No Known Problems Paternal Grandfather    • Breast cancer Maternal Aunt      I have reviewed and agree with the history as documented  E-Cigarette/Vaping   • E-Cigarette Use Never User      E-Cigarette/Vaping Substances   • Nicotine No    • THC No    • CBD No    • Flavoring No    • Other No    • Unknown No      Social History     Tobacco Use   • Smoking status: Never   • Smokeless tobacco: Never   Vaping Use   • Vaping Use: Never used   Substance Use Topics   • Alcohol use: Never   • Drug use: Never        Review of Systems   Constitutional: Negative for chills and fever  HENT: Negative for congestion, rhinorrhea and sore throat  Respiratory: Negative for cough and shortness of breath  Cardiovascular: Negative for chest pain and palpitations  Gastrointestinal: Negative for abdominal pain, diarrhea, nausea and vomiting  Genitourinary: Negative for dysuria and hematuria  Musculoskeletal: Negative for back pain and neck pain  Neurological: Positive for headaches  Negative for dizziness, syncope, weakness, light-headedness and numbness  All other systems reviewed and are negative  Physical Exam  ED Triage Vitals   Temperature Pulse Respirations Blood Pressure SpO2   03/27/23 1922 03/27/23 1922 03/27/23 1922 03/27/23 1922 03/27/23 2000   98 8 °F (37 1 °C) 90 18 (!) 192/90 98 %      Temp Source Heart Rate Source Patient Position - Orthostatic VS BP Location FiO2 (%)   03/27/23 1922 03/27/23 1922 03/27/23 1922 03/27/23 1922 --   Oral Monitor Sitting Right arm       Pain Score       03/27/23 2133       5             Orthostatic Vital Signs  Vitals:    03/27/23 1922 03/27/23 2000 03/27/23 2030 03/27/23 2300   BP: (!) 192/90 166/95 154/74 125/76   Pulse: 90 84 80 76   Patient Position - Orthostatic VS: Sitting Lying Lying Lying       Physical Exam  Vitals and nursing note reviewed  Constitutional:       General: She is not in acute distress  Appearance: Normal appearance  She is normal weight  She is not ill-appearing  HENT:      Head: Normocephalic and atraumatic  Right Ear: External ear normal       Left Ear: External ear normal       Nose: Nose normal  No congestion or rhinorrhea  Mouth/Throat:      Mouth: Mucous membranes are moist       Pharynx: Oropharynx is clear  No oropharyngeal exudate or posterior oropharyngeal erythema  Eyes:      Extraocular Movements: Extraocular movements intact  Conjunctiva/sclera: Conjunctivae normal       Pupils: Pupils are equal, round, and reactive to light  Cardiovascular:      Rate and Rhythm: Normal rate and regular rhythm  Pulses: Normal pulses  Heart sounds: Normal heart sounds  No murmur heard  Pulmonary:      Effort: Pulmonary effort is normal  No respiratory distress  Breath sounds: Normal breath sounds  No wheezing or rales  Abdominal:      General: Abdomen is flat   Bowel sounds are normal  There is no distension  Palpations: Abdomen is soft  Tenderness: There is no abdominal tenderness  There is no right CVA tenderness, left CVA tenderness or guarding  Musculoskeletal:         General: No swelling or tenderness  Normal range of motion  Cervical back: Normal range of motion and neck supple  No tenderness  Skin:     General: Skin is warm and dry  Capillary Refill: Capillary refill takes less than 2 seconds  Neurological:      General: No focal deficit present  Mental Status: She is alert and oriented to person, place, and time  Cranial Nerves: No cranial nerve deficit  Sensory: No sensory deficit  Motor: No weakness  Comments: Awake, GCS 15, moving all extremity spontaneously  No facial asymmetry  Cranial nerves II-XII intact and symmetric  Strength 5/5 and sensation intact and symmetric in all 4 extremities  ED Medications  Medications   metoclopramide (REGLAN) injection 10 mg (10 mg Intravenous Given 3/27/23 2134)   ketorolac (TORADOL) injection 15 mg (15 mg Intravenous Given 3/27/23 2134)   acetaminophen (TYLENOL) tablet 975 mg (975 mg Oral Given 3/27/23 2134)   magnesium sulfate IVPB (premix) SOLN 1 g (0 g Intravenous Stopped 3/27/23 2357)       Diagnostic Studies  Results Reviewed     None                 No orders to display         Procedures  Procedures      ED Course  ED Course as of 03/28/23 0054   Mon Mar 27, 2023   2243 Patient feels mild improvement with migraine cocktail, still with slight headache  Will add magnesium IV to treatment  Medical Decision Making  55-year-old female with hx of HTN, migraine headaches, and L MCA aneurysm s/p clipping at [facility] (2018) presenting for evaluation of progressively worsening headache over the last week  She notes her headache was gradual in onset, waxing and waning, and achieved a maximal severity 2 days ago    She describes headache as a throbbing "sensation and she took 3 Tylenol 2 days ago with minimal relief  She did not take any medication today for her headache, noting that she tried to [place] with it  \" She also a flushing and warm sensation in her face that went away spontaneously, and she also noticed an elevated blood pressure at home, and these symptoms prompted her to come in for evaluation today  She had a 4vCA in January 2023 which showed a 2 x 3 mm post clipping dogear along the MCA terminus and a small 2 mm aneurysm superior division of M2  She denies any fever, chills, visual changes, cough, dyspnea, chest pain, abdominal pain, nausea, vomiting, diarrhea, back pain, neck pain, lightheadedness, dizziness, numbness, or focal weakness  On exam the patient is afebrile, initially mildly hypertensive but normal blood pressure on repeat evaluation, otherwise VSS  In no acute distress  Nontoxic-appearing  Heart with regular rate and rhythm, lungs clear to auscultation bilaterally, abdomen is soft and nontender  No nuchal rigidity  No focal neurological deficits  See above for full exam   Symptoms ongoing for a week, seems consistent with primary headache with known history of migraines  No concerning features for acute subarachnoid or aneurysmal rupture (no maximal severity at onset, no thunderclap presentation, no other neurological symptoms)  We will treat symptomatically with migraine cocktail, if no improvement will pursue CT imaging  On reevaluation the patient has mild to moderate improvement after Reglan, Toradol, and Tylenol  We will try IV magnesium and reassess  Patient with headache resolution after addition of magnesium  Recommended follow-up with PCP and neurology  I discussed all findings, treatment, red flags/return precautions, and outpatient follow-up and the patient/family understand and agree  Stable for discharge  Risk  OTC drugs  Prescription drug management              Disposition  Final diagnoses:   Migraine " headache   HTN (hypertension)   History of cerebral aneurysm     Time reflects when diagnosis was documented in both MDM as applicable and the Disposition within this note     Time User Action Codes Description Comment    3/28/2023 12:05 AM Cecelia Feil Add [G43 909] Migraine headache     3/28/2023 12:05 AM Cecelia Feil Add [R03 0] Blood pressure elevated without history of HTN     3/28/2023 12:05 AM Stevenson Kruger Remove [R03 0] Blood pressure elevated without history of HTN     3/28/2023 12:06 AM Cecelia Feil Add [I10] HTN (hypertension)     3/28/2023 12:06 AM Cecelia Feil Add [Z86 79] History of cerebral aneurysm       ED Disposition     ED Disposition   Discharge    Condition   Stable    Date/Time   Tue Mar 28, 2023 12:06 AM    Comment   Alina Aleman discharge to home/self care  Follow-up Information     Follow up With Specialties Details Why Contact Info Additional Information    Halle Oliva PA-C Family Medicine, Physician Assistant Call in 1 week For follow up [address] [place] [phone] [id number] Emergency Department Emergency Medicine Go to  If symptoms worsen [address] [phone]  [address] [facility] Emergency Department, [address] [place] [address], [address] [facility]          Patient's Medications   Discharge Prescriptions    No medications on file     No discharge procedures on file  PDMP Review     None           ED Provider  Attending physically available and evaluated Alina Aleman I managed the patient along with the ED Attending      Electronically Signed by         Wenceslao Pino MD  03/28/23 4768

## 2023-03-28 NOTE — LETTER
March 29, 2023     Patient: Delvis Black   YOB: 1975   Date of Visit: 3/28/2023       To Whom it May Concern:    Dionne Pagan is under my professional care  Please excuse her from work on 3/28/23 and 3/29/23  If you have any questions or concerns, please don't hesitate to call       Sincerely,   Lachelle Fix, DO

## 2023-03-29 RX ORDER — PREDNISONE 20 MG/1
TABLET ORAL
Qty: 12 TABLET | Refills: 0 | Status: SHIPPED | OUTPATIENT
Start: 2023-03-29 | End: 2023-04-04

## 2023-03-29 NOTE — TELEPHONE ENCOUNTER
Letter sent to patients mychart for yesterday and today  I will also send over a course of Prednisone to be taken for 6 days

## 2023-03-29 NOTE — TELEPHONE ENCOUNTER
Called pt    When did HA start? Over the weekend  States that after ER visit, her bp went down  But still having the HA  Pt takes verapamil 120 mg daily  She will call her PCP  Location/Description: She cannot describe it  States that it was just a normal HA  It alternate to left then right side of her head  Right side of ear and face were swollen  Face feels heavy  Nothing was done in the ER per pt  Pain scale: Yesterday, it was 4/10, Now, 2/10 but she is concerned about the swelling in the right side of her face and ear  Pt states that nothing was done in the ER about the swelling  Associated symptoms: none    Precipitating factors: unknown     Alleviating factors: This past Saturday, she took 3 tabs of tylenol  Current migraine medications are confirmed as:  Amitriptyline 10 mg  3 tabs daily    Medications tried in the past? Not tried decadron, depakote, or olanzapine     Pt missed work yesterday (3/28/23) and today (3/29/23)  She plan to go back to work  Requesting [facility]       Cb [phone], ok to leave a detailed message

## 2023-03-29 NOTE — TELEPHONE ENCOUNTER
Offered pt appt today with Dr Shama Austin but she cant make she has no money for Erick Javier  He doesn't have anymore sooner appts  Pt would like a call back from the nurse she is still having headaches and cant go to work  Please call patient at [phone]

## 2023-03-30 ENCOUNTER — OFFICE VISIT (OUTPATIENT)
Dept: FAMILY MEDICINE CLINIC | Facility: CLINIC | Age: 48
End: 2023-03-30

## 2023-03-30 VITALS
DIASTOLIC BLOOD PRESSURE: 90 MMHG | SYSTOLIC BLOOD PRESSURE: 120 MMHG | OXYGEN SATURATION: 98 % | HEART RATE: 94 BPM | WEIGHT: 289 LBS | HEIGHT: 66 IN | BODY MASS INDEX: 46.45 KG/M2

## 2023-03-30 DIAGNOSIS — M25.562 CHRONIC PAIN OF BOTH KNEES: ICD-10-CM

## 2023-03-30 DIAGNOSIS — G89.29 CHRONIC PAIN OF BOTH KNEES: ICD-10-CM

## 2023-03-30 DIAGNOSIS — E11.9 TYPE 2 DIABETES MELLITUS WITHOUT COMPLICATION, WITHOUT LONG-TERM CURRENT USE OF INSULIN (HCC): ICD-10-CM

## 2023-03-30 DIAGNOSIS — R51.9 NONINTRACTABLE HEADACHE, UNSPECIFIED CHRONICITY PATTERN, UNSPECIFIED HEADACHE TYPE: Primary | ICD-10-CM

## 2023-03-30 DIAGNOSIS — M25.561 CHRONIC PAIN OF BOTH KNEES: ICD-10-CM

## 2023-03-30 DIAGNOSIS — E55.9 VITAMIN D DEFICIENCY: ICD-10-CM

## 2023-03-30 DIAGNOSIS — I10 BENIGN ESSENTIAL HYPERTENSION: ICD-10-CM

## 2023-03-30 DIAGNOSIS — M17.0 PRIMARY OSTEOARTHRITIS OF BOTH KNEES: ICD-10-CM

## 2023-03-30 NOTE — LETTER
March 30, 2023     Patient: Deacon Nino  YOB: 1975  Date of Visit: 3/30/2023      To Whom it May Concern:    Oumar Monroy is under my professional care  Socrates Dumont was seen in my office on 3/30/2023  Socrates Dumont may return to work on 4/3/2023  If you have any questions or concerns, please don't hesitate to call           Sincerely,          Lucie Hammans, PA-C        CC: No Recipients

## 2023-03-30 NOTE — PROGRESS NOTES
Name: Claudia Burns      : 1975      MRN: [id number]  Encounter Provider: Laura Sanches PA-C  Encounter Date: 3/30/2023   Encounter department: [facility] PRIMARY CARE    Assessment & Plan     Patient Instructions   Assessment/plan:  1  Headache-patient was evaluated in the emergency room setting  She does continue to follow-up with neurology and will be starting prednisone therapy for this today as prescribed by them  2   Hypertension-presently stable  Blood pressure has normalized with regimen of chlorthalidone, verapamil, losartan  3   Diabetes-stable  Patient has follow-up and labs scheduled next month  4   Osteoarthritis of the knees-patient has been stable  She is off diclofenac therapy and would recommend holding off on this since it may raise her blood pressure again  1  Nonintractable headache, unspecified chronicity pattern, unspecified headache type    2  Benign essential hypertension  -     CBC and differential; Future; Expected date: 2023  -     Comprehensive metabolic panel; Future; Expected date: 2023  -     Hemoglobin A1C; Future; Expected date: 2023  -     Lipid Panel with Direct LDL reflex; Future; Expected date: 2023  -     Microalbumin / creatinine urine ratio; Future; Expected date: 2023  -     TSH, 3rd generation with Free T4 reflex; Future; Expected date: 2023    3  Type 2 diabetes mellitus without complication, without long-term current use of insulin (HCC)  -     CBC and differential; Future; Expected date: 2023  -     Comprehensive metabolic panel; Future; Expected date: 2023  -     Hemoglobin A1C; Future; Expected date: 2023  -     Lipid Panel with Direct LDL reflex; Future; Expected date: 2023  -     Microalbumin / creatinine urine ratio; Future; Expected date: 2023  -     TSH, 3rd generation with Free T4 reflex; Future; Expected date: 2023    4   Primary osteoarthritis of both knees  -     CBC and differential; Future; Expected date: 04/30/2023  -     Comprehensive metabolic panel; Future; Expected date: 04/30/2023  -     Hemoglobin A1C; Future; Expected date: 04/30/2023  -     Lipid Panel with Direct LDL reflex; Future; Expected date: 04/30/2023  -     Microalbumin / creatinine urine ratio; Future; Expected date: 04/30/2023  -     TSH, 3rd generation with Free T4 reflex; Future; Expected date: 04/30/2023    5  Chronic pain of both knees    6  Vitamin D deficiency  -     CBC and differential; Future; Expected date: 04/30/2023  -     Comprehensive metabolic panel; Future; Expected date: 04/30/2023  -     Hemoglobin A1C; Future; Expected date: 04/30/2023  -     Lipid Panel with Direct LDL reflex; Future; Expected date: 04/30/2023  -     Microalbumin / creatinine urine ratio; Future; Expected date: 04/30/2023  -     TSH, 3rd generation with Free T4 reflex; Future; Expected date: 04/30/2023         Subjective      PI: This is a 18-year-old female that presents to the office for follow-up of recent emergency room visit  She had significant headache and symptoms became worsening and persistent  She was concerned because she does have history of cerebral artery aneurysm and has had prior clipping  She does follow with neurosurgery for this regularly  She was eventually discharged to follow-up with neurology and after headache and some localized facial swelling persisted they called in a course of prednisone for her this morning  She has not yet had a chance to start it  She has not yet been back to work since the symptoms have began  Symptoms started on Monday the 27th  Review of Systems   Constitutional: Negative for chills, fatigue and fever  HENT: Negative for congestion, ear pain and sinus pressure  Eyes: Negative for visual disturbance  Respiratory: Negative for cough, chest tightness and shortness of breath  Cardiovascular: Negative for chest pain and palpitations  "  Gastrointestinal: Negative for diarrhea, nausea and vomiting  Endocrine: Negative for polyuria  Genitourinary: Negative for dysuria and frequency  Musculoskeletal: Negative for arthralgias and myalgias  Skin: Negative for pallor and rash  Neurological: Positive for headaches  Negative for dizziness, weakness, light-headedness and numbness  Psychiatric/Behavioral: Negative for agitation, behavioral problems and sleep disturbance  All other systems reviewed and are negative  Current Outpatient Medications on File Prior to Visit   Medication Sig   • amitriptyline (ELAVIL) 10 mg tablet Take 3 tabs by mouth daily   • chlorthalidone 25 mg tablet Take 0 5 tablets (12 5 mg total) by mouth in the morning   • Cholecalciferol (Vitamin D3) 50 MCG (2000 UT) capsule Take 1 capsule (2,000 Units total) by mouth every morning   • losartan (COZAAR) 100 MG tablet Take 1 tablet (100 mg total) by mouth in the morning   • omeprazole (PriLOSEC) 40 MG capsule Take 1 capsule (40 mg total) by mouth daily before breakfast   • polyethylene glycol (GLYCOLAX) 17 GM/SCOOP powder Take 17 g by mouth daily   • verapamil (VERELAN PM) 120 MG 24 hr capsule Take 1 capsule (120 mg total) by mouth in the morning   • [DISCONTINUED] diclofenac (VOLTAREN) 75 mg EC tablet Take 1 tablet (75 mg total) by mouth 2 (two) times a day as needed (knee pain)   • omeprazole (PriLOSEC) 40 MG capsule Take 1 capsule (40 mg total) by mouth 2 (two) times a day before meals for 14 days   • predniSONE 20 mg tablet Take 3 tablets (60 mg total) by mouth daily for 2 days, THEN 2 tablets (40 mg total) daily for 2 days, THEN 1 tablet (20 mg total) daily for 2 days   (Patient not taking: Reported on 3/30/2023)       Objective     /90 (BP Location: Left arm, Patient Position: Sitting, Cuff Size: Large)   Pulse 94   Ht 5' 5 5\" (1 664 m)   Wt 131 kg (289 lb)   LMP  (LMP Unknown)   SpO2 98%   BMI 47 36 kg/m²     Physical Exam  Vitals and nursing note " reviewed  Constitutional:       General: She is not in acute distress  Appearance: She is well-developed  HENT:      Head: Normocephalic and atraumatic  Right Ear: External ear normal       Left Ear: External ear normal       Nose: Nose normal       Mouth/Throat:      Pharynx: No oropharyngeal exudate  Eyes:      Conjunctiva/sclera: Conjunctivae normal       Pupils: Pupils are equal, round, and reactive to light  Neck:      Thyroid: No thyromegaly  Trachea: No tracheal deviation  Cardiovascular:      Rate and Rhythm: Normal rate and regular rhythm  Heart sounds: Normal heart sounds  No murmur heard  No friction rub  Pulmonary:      Effort: Pulmonary effort is normal  No respiratory distress  Breath sounds: Normal breath sounds  No wheezing or rales  Abdominal:      General: Bowel sounds are normal  There is no distension  Palpations: Abdomen is soft  Tenderness: There is no abdominal tenderness  There is no guarding or rebound  Musculoskeletal:         General: No tenderness  Normal range of motion  Cervical back: Normal range of motion and neck supple  Lymphadenopathy:      Cervical: No cervical adenopathy  Skin:     General: Skin is warm and dry  Findings: No erythema or rash  Neurological:      Mental Status: She is alert and oriented to person, place, and time  Cranial Nerves: No cranial nerve deficit  Coordination: Coordination normal    Psychiatric:         Behavior: Behavior normal          Thought Content:  Thought content normal        Anuja Herman PA-C

## 2023-03-30 NOTE — PATIENT INSTRUCTIONS
Assessment/plan:  1  Headache-patient was evaluated in the emergency room setting  She does continue to follow-up with neurology and will be starting prednisone therapy for this today as prescribed by them  2   Hypertension-presently stable  Blood pressure has normalized with regimen of chlorthalidone, verapamil, losartan  3   Diabetes-stable  Patient has follow-up and labs scheduled next month  4   Osteoarthritis of the knees-patient has been stable  She is off diclofenac therapy and would recommend holding off on this since it may raise her blood pressure again 
no

## 2023-04-18 ENCOUNTER — TELEPHONE (OUTPATIENT)
Dept: FAMILY MEDICINE CLINIC | Facility: CLINIC | Age: 48
End: 2023-04-18

## 2023-04-18 DIAGNOSIS — Z23 ENCOUNTER FOR IMMUNIZATION: ICD-10-CM

## 2023-04-18 DIAGNOSIS — Z12.4 SCREENING FOR CERVICAL CANCER: ICD-10-CM

## 2023-04-18 DIAGNOSIS — E87.6 HYPOKALEMIA: Primary | ICD-10-CM

## 2023-04-18 RX ORDER — POTASSIUM CHLORIDE 1.5 G/1.58G
20 POWDER, FOR SOLUTION ORAL DAILY
Qty: 30 EACH | Refills: 1 | Status: SHIPPED | OUTPATIENT
Start: 2023-04-18 | End: 2023-07-26 | Stop reason: SDUPTHER

## 2023-04-18 NOTE — TELEPHONE ENCOUNTER
Potassium level is critically low.  Would recommend starting on potassium chloride 20 mill equivalents daily and reassessing in 1 week.

## 2023-04-21 PROBLEM — E87.6 HYPOKALEMIA: Status: ACTIVE | Noted: 2023-04-21

## 2023-04-28 ENCOUNTER — APPOINTMENT (OUTPATIENT)
Dept: LAB | Facility: HOSPITAL | Age: 48
End: 2023-04-28

## 2023-04-28 DIAGNOSIS — Z00.8 HEALTH EXAMINATION IN POPULATION SURVEYS: ICD-10-CM

## 2023-04-28 LAB
CHOLEST SERPL-MCNC: 178 MG/DL
HDLC SERPL-MCNC: 58 MG/DL
LDLC SERPL CALC-MCNC: 106 MG/DL (ref 0–100)
NONHDLC SERPL-MCNC: 120 MG/DL
TRIGL SERPL-MCNC: 69 MG/DL

## 2023-05-01 LAB
EST. AVERAGE GLUCOSE BLD GHB EST-MCNC: 160 MG/DL
HBA1C MFR BLD: 7.2 %

## 2023-05-04 ENCOUNTER — APPOINTMENT (OUTPATIENT)
Dept: LAB | Facility: HOSPITAL | Age: 48
End: 2023-05-04

## 2023-05-04 DIAGNOSIS — E87.6 HYPOKALEMIA: ICD-10-CM

## 2023-05-04 LAB
ANION GAP SERPL CALCULATED.3IONS-SCNC: 8 MMOL/L (ref 4–13)
BUN SERPL-MCNC: 15 MG/DL (ref 5–25)
CALCIUM SERPL-MCNC: 9.4 MG/DL (ref 8.4–10.2)
CHLORIDE SERPL-SCNC: 97 MMOL/L (ref 96–108)
CO2 SERPL-SCNC: 31 MMOL/L (ref 21–32)
CREAT SERPL-MCNC: 0.72 MG/DL (ref 0.6–1.3)
GFR SERPL CREATININE-BSD FRML MDRD: 100 ML/MIN/1.73SQ M
GLUCOSE SERPL-MCNC: 99 MG/DL (ref 65–140)
POTASSIUM SERPL-SCNC: 3.5 MMOL/L (ref 3.5–5.3)
SODIUM SERPL-SCNC: 136 MMOL/L (ref 135–147)

## 2023-05-11 ENCOUNTER — OFFICE VISIT (OUTPATIENT)
Dept: FAMILY MEDICINE CLINIC | Facility: CLINIC | Age: 48
End: 2023-05-11

## 2023-05-11 ENCOUNTER — TELEPHONE (OUTPATIENT)
Dept: FAMILY MEDICINE CLINIC | Facility: CLINIC | Age: 48
End: 2023-05-11

## 2023-05-11 VITALS
HEART RATE: 72 BPM | BODY MASS INDEX: 47.03 KG/M2 | WEIGHT: 287 LBS | DIASTOLIC BLOOD PRESSURE: 68 MMHG | RESPIRATION RATE: 16 BRPM | SYSTOLIC BLOOD PRESSURE: 118 MMHG

## 2023-05-11 DIAGNOSIS — I67.1 CEREBRAL ANEURYSM, NONRUPTURED: ICD-10-CM

## 2023-05-11 DIAGNOSIS — I10 BENIGN ESSENTIAL HYPERTENSION: ICD-10-CM

## 2023-05-11 DIAGNOSIS — G89.29 CHRONIC INTRACTABLE HEADACHE, UNSPECIFIED HEADACHE TYPE: Primary | ICD-10-CM

## 2023-05-11 DIAGNOSIS — E11.9 TYPE 2 DIABETES MELLITUS WITHOUT COMPLICATION, WITHOUT LONG-TERM CURRENT USE OF INSULIN (HCC): ICD-10-CM

## 2023-05-11 DIAGNOSIS — R51.9 CHRONIC INTRACTABLE HEADACHE, UNSPECIFIED HEADACHE TYPE: Primary | ICD-10-CM

## 2023-05-11 NOTE — PROGRESS NOTES
Assessment and Plan:  Patient Instructions   Assessment/plan:  1  Intractable headaches-patient does have history of brain aneurysm and is following with neurosurgery  She is also following with Dr Forrest Cummings, neurology for this  She has been missing multiple days of work and was requesting leave of absence under [place]  She can forward paperwork to me and I will fill out to the best of my ability  2   Brain aneurysm-stable  Currently following with neurosurgery  She does have increased head pain when she is bending forward  3   Hypertension-presently stable with losartan and chlorthalidone and verapamil  4   Type 2 diabetes-presently stable with metformin with hemoglobin A1c of 7 2       1  Chronic intractable headache, unspecified headache type    2  Cerebral aneurysm, nonruptured    3  Benign essential hypertension    4  Type 2 diabetes mellitus without complication, without long-term current use of insulin (HCC)             Diagnoses and all orders for this visit:    Chronic intractable headache, unspecified headache type    Cerebral aneurysm, nonruptured    Benign essential hypertension    Type 2 diabetes mellitus without complication, without long-term current use of insulin (HCC)            Subjective:      Patient ID: Maynor Meehan is a 56 y o  female  CC:    No chief complaint on file  HPI:    HPI: This is a 43-year-old female that presents to the office with concerns over persistent headaches which have been quite severe and incapacitating  She has been following with neurologist for this as well as seeing neurosurgery for history of brain aneurysm  She does have increased pain with activities like bending forward  Many times with headache she has to lie down in a dark place and sometimes has nausea associated with them  She can have difficulty focusing her eyes but does not specifically have blurred vision or other visual disturbance    She has been having difficulty concentrating because of the pain  She has been missing multiple days of work and is requesting to take a leave of absence while she works with neurology and neurosurgery to better her condition  The following portions of the patient's history were reviewed and updated as appropriate: allergies, current medications, past family history, past medical history, past social history, past surgical history and problem list       Review of Systems   Constitutional: Negative for chills, fatigue and fever  HENT: Negative for congestion, ear pain and sinus pressure  Eyes: Negative for visual disturbance  Respiratory: Negative for cough, chest tightness and shortness of breath  Cardiovascular: Negative for chest pain and palpitations  Gastrointestinal: Positive for nausea  Negative for diarrhea and vomiting  Endocrine: Negative for polyuria  Genitourinary: Negative for dysuria and frequency  Musculoskeletal: Negative for arthralgias and myalgias  Skin: Negative for pallor and rash  Neurological: Positive for headaches  Negative for dizziness, weakness, light-headedness and numbness  Psychiatric/Behavioral: Negative for agitation, behavioral problems and sleep disturbance  All other systems reviewed and are negative  Data to review:       Objective:    Vitals:    05/11/23 0732   BP: 118/68   Pulse: 72   Resp: 16   Weight: 130 kg (287 lb)        Physical Exam  Constitutional:       General: She is not in acute distress  Appearance: Normal appearance  HENT:      Head: Normocephalic and atraumatic  Right Ear: Tympanic membrane normal       Left Ear: Tympanic membrane normal       Nose: No congestion or rhinorrhea  Eyes:      Conjunctiva/sclera: Conjunctivae normal       Pupils: Pupils are equal, round, and reactive to light  Neck:      Vascular: No carotid bruit  Cardiovascular:      Rate and Rhythm: Normal rate and regular rhythm  Heart sounds: No murmur heard    Pulmonary:      Effort: Pulmonary effort is normal  No respiratory distress  Breath sounds: Normal breath sounds  Abdominal:      Palpations: Abdomen is soft  Musculoskeletal:         General: Normal range of motion  Cervical back: Normal range of motion and neck supple  No muscular tenderness  Lymphadenopathy:      Cervical: No cervical adenopathy  Skin:     General: Skin is warm  Capillary Refill: Capillary refill takes less than 2 seconds  Neurological:      General: No focal deficit present  Mental Status: She is alert and oriented to person, place, and time     Psychiatric:         Mood and Affect: Mood normal

## 2023-05-11 NOTE — PATIENT INSTRUCTIONS
Assessment/plan:  1  Intractable headaches-patient does have history of brain aneurysm and is following with neurosurgery  She is also following with Dr Angelika Valverde, neurology for this  She has been missing multiple days of work and was requesting leave of absence under [place]  She can forward paperwork to me and I will fill out to the best of my ability  2   Brain aneurysm-stable  Currently following with neurosurgery  She does have increased head pain when she is bending forward  3   Hypertension-presently stable with losartan and chlorthalidone and verapamil  4   Type 2 diabetes-presently stable with metformin with hemoglobin A1c of 7 2

## 2023-05-11 NOTE — LETTER
May 11, 2023     Patient: Yari Bloom  YOB: 1975  Date of Visit: 5/11/2023      To Whom it May Concern:    Joann Butcher is under my professional care  Lisbeth Alvarez was seen in my office on 5/11/2023  Lisbeth Alvarez is following with Neurology and Neurosurgery for history of Brain Aneurysm and incapacitating headaches  She is not able to work at this time due to her symptoms  She will have further evaluation with the specialists this summer and may need to be out of work for approximately 12 weeks  If you have any questions or concerns, please don't hesitate to call           Sincerely,          Gucci Pitts PA-C        CC: No Recipients

## 2023-05-15 ENCOUNTER — TELEPHONE (OUTPATIENT)
Dept: FAMILY MEDICINE CLINIC | Facility: CLINIC | Age: 48
End: 2023-05-15

## 2023-05-15 NOTE — TELEPHONE ENCOUNTER
New form from Karon Faustin placed into [facility]

## 2023-05-16 ENCOUNTER — TELEPHONE (OUTPATIENT)
Dept: FAMILY MEDICINE CLINIC | Facility: CLINIC | Age: 48
End: 2023-05-16

## 2023-05-16 NOTE — TELEPHONE ENCOUNTER
Kalyan, [address] left this message  I think you have the form  Also if you could let me know the answers of their questions in this message I will give them a call  Good afternoon  My name is Sofia Julien  I'm calling from [facility] [employer]  I am calling on behalf of a patient of Briana [employer]  I am just calling on behalf of this patient as I  wanted to follow up on a fax that we had sent  If this is including an attending physician statement just regarding the time she's been advised out of work, we'll need that to be completed and returned  But I was also calling just to see if I can verify information regarding this patient being advised out of work  She advised that she was told to stop working at her recent visit on May 11th due to an aneurysm  So I was just needing to verify that information and any sort of time she's been taken out of work for my direct phone number to call back to verify this information  It's four, zero, two, three, five, one, two, five, zero, five  And our fax number to send back any sort of medical certification or attending physician statement for her disability claim that phone or fax number is four, zero, two, nine, nine, seven, one, eight, six, five Thank you so much  Have a wonderful rest of the day

## 2023-05-18 NOTE — TELEPHONE ENCOUNTER
Spoke to patient told her I faxed the Attending physician statement on 5/17/23 and the FMLA on on 5/12/23 Will mail both to her  I did not see any place that she needed to sign

## 2023-06-06 ENCOUNTER — OFFICE VISIT (OUTPATIENT)
Dept: NEUROLOGY | Facility: CLINIC | Age: 48
End: 2023-06-06
Payer: COMMERCIAL

## 2023-06-06 VITALS
WEIGHT: 287.7 LBS | TEMPERATURE: 97.6 F | HEART RATE: 89 BPM | DIASTOLIC BLOOD PRESSURE: 96 MMHG | BODY MASS INDEX: 46.24 KG/M2 | SYSTOLIC BLOOD PRESSURE: 140 MMHG | HEIGHT: 66 IN | OXYGEN SATURATION: 96 %

## 2023-06-06 DIAGNOSIS — R29.818 SUSPECTED SLEEP APNEA: ICD-10-CM

## 2023-06-06 DIAGNOSIS — G47.00 INSOMNIA, UNSPECIFIED TYPE: ICD-10-CM

## 2023-06-06 DIAGNOSIS — F32.A ANXIETY AND DEPRESSION: ICD-10-CM

## 2023-06-06 DIAGNOSIS — F41.9 ANXIETY AND DEPRESSION: ICD-10-CM

## 2023-06-06 DIAGNOSIS — G43.109 MIGRAINE WITH AURA AND WITHOUT STATUS MIGRAINOSUS, NOT INTRACTABLE: Primary | ICD-10-CM

## 2023-06-06 DIAGNOSIS — E66.01 MORBID OBESITY (HCC): ICD-10-CM

## 2023-06-06 PROCEDURE — 99214 OFFICE O/P EST MOD 30 MIN: CPT | Performed by: STUDENT IN AN ORGANIZED HEALTH CARE EDUCATION/TRAINING PROGRAM

## 2023-06-06 RX ORDER — AMITRIPTYLINE HYDROCHLORIDE 50 MG/1
50 TABLET, FILM COATED ORAL
Qty: 90 TABLET | Refills: 3 | Status: SHIPPED | OUTPATIENT
Start: 2023-06-06

## 2023-06-06 RX ORDER — RIMEGEPANT SULFATE 75 MG/75MG
TABLET, ORALLY DISINTEGRATING ORAL
Qty: 16 TABLET | Refills: 3 | Status: SHIPPED | OUTPATIENT
Start: 2023-06-06

## 2023-06-06 NOTE — LETTER
June 6, 2023     Patient: Lulu Poster   YOB: 1975   Date of Visit: 6/6/2023     To Whom It May Concern:    Ivette Moreland is under my professional care  Rianna Gr was seen in my office on 6/6/2023  She has had history of migraines and is following with me for that  She will  require intermittent absences for this and it would absolutely be more beneficial if she could work a regular first shift schedule as opposed to night shift, as this may help alleviate the frequency of headaches  If you have any questions or concerns, please don't hesitate to call        Sincerely,   Yocasta Ring, DO

## 2023-06-06 NOTE — PATIENT INSTRUCTIONS
Additional Testing  Sleep study    Headache Calendar  Please maintain a headache calendar  Consider using phone applications such as Migraine Buddy or [employer] Migraine Tracker     Headache/migraine treatment:   Acute medications (for immediate treatment of a headache): It is ok to take ibuprofen, acetaminophen or naproxen (Advil, Tylenol,  Aleve, Excedrin) if they help your headaches you should limit these to No more than 2-3 times a week to avoid medication overuse/rebound headaches  Prescription Abortive  Nurtec 75mg oral dissolving tablet as needed  Max 1 per day    Prescription preventive medications for headaches/migraines   (to take every day to help prevent headaches - not to take at the time of headache):  [x] Amitriptyline - increase to 50mg nightly     *Typically these types of medications take time until you see the benefit, although some may see improvement in days, often it may take weeks, especially if the medication is being titrated up to a beneficial level  Please contact us if there are any concerns or questions regarding the medication  Lifestyle Recommendations:  [x] SLEEP - Maintain a regular sleep schedule: Adults need at least 7-8 hours of uninterrupted a night  Maintain good sleep hygiene:  Going to bed and waking up at consistent times, avoiding excessive daytime naps, avoiding caffeinated beverages in the evening, avoid excessive stimulation in the evening and generally using bed primarily for sleeping  One hour before bedtime would recommend turning lights down lower, decreasing your activity (may read quietly, listen to music at a low volume)  When you get into bed, should eliminate all technology (no texting, emailing, playing with your phone, iPad or tablet in bed)  [x] HYDRATION - Maintain good hydration  Drink  2L of fluid a day (4 typical small water bottles)  [x] DIET - Maintain good nutrition   In particular don't skip meals and try and eat healthy balanced meals regularly  [x] TRIGGERS - Look for other triggers and avoid them: Limit caffeine to 1-2 cups a day or less  Avoid dietary triggers that you have noticed bring on your headaches (this could include aged cheese, peanuts, MSG, aspartame and nitrates)  [x] EXERCISE - physical exercise as we all know is good for you in many ways, and not only is good for your heart, but also is beneficial for your mental health, cognitive health and  chronic pain/headaches  I would encourage at the least 5 days of physical exercise weekly for at least 30 minutes  Education and Follow-up  [x] Please call with any questions or concerns  Of course if any new concerning symptoms go to the emergency department    [x] Follow up in 6 months

## 2023-06-06 NOTE — PROGRESS NOTES
Review of Systems   Constitutional: Negative  Negative for appetite change and fever  HENT: Negative  Negative for hearing loss, tinnitus, trouble swallowing and voice change  Eyes: Negative  Negative for photophobia, pain and visual disturbance  Respiratory: Negative  Negative for shortness of breath  Cardiovascular: Negative  Negative for palpitations  Gastrointestinal: Positive for nausea  Negative for vomiting  Endocrine: Negative  Negative for cold intolerance  Genitourinary: Negative  Negative for dysuria, frequency and urgency  Musculoskeletal: Negative  Negative for gait problem, myalgias and neck pain  Skin: Negative  Negative for rash  Allergic/Immunologic: Negative  Neurological: Positive for dizziness, light-headedness and headaches  Negative for tremors, seizures, syncope, facial asymmetry, speech difficulty, weakness and numbness  Hematological: Negative  Does not bruise/bleed easily  Psychiatric/Behavioral: Negative  Negative for confusion, hallucinations and sleep disturbance  All other systems reviewed and are negative  Since your last visit are your headaches worsened    Any change to the headache type? no    What is your current headache frequency: 3 last month     Are you taking your current medications as prescribed? yes     If no, why not?      Do you have any side effects? no    How may days per week do you take an abortive medicine? 0

## 2023-06-06 NOTE — PROGRESS NOTES
[facility] Consult - Follow up   PATIENT:  Arthur Hamilton  MRN:  [id number]  :  1975  DATE OF SERVICE:  2023  REFERRED BY: No ref  provider found  PMD: Josue Sewell PA-C    Assessment/Plan:   Arthur Hamilton is a delightful 56 y o  female with a past medical history that includes hypertension, cerebral aneurysm, insomnia, morbid obesity here for f/u evaluation of headache  Since her last visit, she reports that her headaches have worsened, but she is unable to elaborate on this  Her frequency has actually decreased from our initial visit, but it appears that she may have had a few flareups since  She was seen in the ED for an intractable migraine as well, but she did not let me know about any of these issues and has been working with her family medicine office for leave from work  She is scheduled to be off of work until August, but I did not recommend this  It appears that work has been very stressful for her so I am happy with a short leave, but think it would be beneficial in the long run if she tried to return to work sooner rather than later  I have also written a letter to see if her employer will work with her to change her shift to the daytime as she has found significant benefits from a headache standpoint with being able to sleep throughout the night  We discussed increasing the amitriptyline to 50 mg since she has found it beneficial and I have also prescribed Nurtec for abortive management  I am concerned about the possibility of sleep apnea contributing to to her symptoms and have recommended that she undergo a sleep study for evaluation  Workup:  - Neurologic assessment reveals unremarkable neurological exam  - With no new or concerning symptoms, no red flags and an unremarkable neurologic exam, there is no specific indication for further evaluation with MRI brain  However, this could be obtained at any time if indicated    - CTA head with and without contrast 10/25/2022: Postsurgical changes with left MCA aneurysm clip  No acute findings  Limited evaluation for residual/recurrent aneurysm  No stenosis  - Sleep study pending     Preventative:  - we discussed headache hygiene and lifestyle factors that may improve headaches  - Amitriptyline - increased to 50mg HS  - Currently on through other providers: Losartan, verapamil  - Past/ failed/contraindicated: None  - future options: SNRI, Topamax, CGRP med, botox     Acute:  - discussed not taking over-the-counter or prescription pain medications more than 2-3 days per week to prevent medication overuse/rebound headache  - Nurtec 75mg oral dissolving tablet  - Currently on through other providers: None  - Past/ failed/contraindicated: Ideally would like to avoid triptans due history of multiple intracranial aneurysms  - future options:  prochlorperazine, Toradol IM or p o , could consider trial of 5 days of Depakote 500 mg nightly or dexamethasone 2 mg daily for prolonged migraine, jose Acosta nurtec  Patient instructions   Additional Testing  Sleep study    Headache Calendar  Please maintain a headache calendar  Consider using phone applications such as Migraine [employer] or [employer] Migraine Tracker     Headache/migraine treatment:   Acute medications (for immediate treatment of a headache): It is ok to take ibuprofen, acetaminophen or naproxen (Advil, Tylenol,  Aleve, Excedrin) if they help your headaches you should limit these to No more than 2-3 times a week to avoid medication overuse/rebound headaches       Prescription Abortive  Nurtec 75mg oral dissolving tablet as needed  Max 1 per day    Prescription preventive medications for headaches/migraines   (to take every day to help prevent headaches - not to take at the time of headache):  [x]?  Amitriptyline - increase to 50mg nightly     *Typically these types of medications take time until you see the benefit, although some may see improvement in days, often it may take weeks, especially if the medication is being titrated up to a beneficial level  Please contact us if there are any concerns or questions regarding the medication       Lifestyle Recommendations:  [x]? SLEEP - Maintain a regular sleep schedule: Adults need at least 7-8 hours of uninterrupted a night  Maintain good sleep hygiene:  Going to bed and waking up at consistent times, avoiding excessive daytime naps, avoiding caffeinated beverages in the evening, avoid excessive stimulation in the evening and generally using bed primarily for sleeping  One hour before bedtime would recommend turning lights down lower, decreasing your activity (may read quietly, listen to music at a low volume)  When you get into bed, should eliminate all technology (no texting, emailing, playing with your phone, iPad or tablet in bed)  [x]? HYDRATION - Maintain good hydration  Drink  2L of fluid a day (4 typical small water bottles)  [x]? DIET - Maintain good nutrition  In particular don't skip meals and try and eat healthy balanced meals regularly  [x]? TRIGGERS - Look for other triggers and avoid them: Limit caffeine to 1-2 cups a day or less  Avoid dietary triggers that you have noticed bring on your headaches (this could include aged cheese, peanuts, MSG, aspartame and nitrates)  [x]? EXERCISE - physical exercise as we all know is good for you in many ways, and not only is good for your heart, but also is beneficial for your mental health, cognitive health and  chronic pain/headaches  I would encourage at the least 5 days of physical exercise weekly for at least 30 minutes       Education and Follow-up  [x]? Please call with any questions or concerns  Of course if any new concerning symptoms go to the emergency department  [x]? Follow up in 6 months  Subjective:   6/6/23: Since her last visit, she feels that her headaches have worsened  Currently reports 10/30 headache days per month   She is taking Amitriptyline 30mg daily and reports that it is helpful, especially with sleep  She is unable to elaborate on why her headaches are worse  She has also been off of work as per her PCP and is scheduled to be off of work until August      Previous History:  1/12/23: Ms Frank Joseph reports a longstanding history of migraines  She states that she previously used to follow with neurology at [facility], but we will need to obtain those records  She has a history of anxiety and depression as well as insomnia and was previously started on amitriptyline for treatment of those issues as well as headaches  She states that she feels it helped a little bit, but is currently on a low-dose of 20 mg nightly  We decided that we would try increasing it to 30 mg and subsequently 40 mg to see if she has any better response  From an abortive standpoint, she reports that Advil will take away the pain entirely in about 15 minutes  Of note, she continues to follow with neurosurgery for known intracranial aneurysms, one of which was surgically clipped previously in 2018 at [facility]  In the future, we may need to consider obtaining a sleep study to rule out obstructive sleep apnea, which could certainly be contributing to a combination of her mood related issues and headaches     Past Medical History:     Past Medical History:   Diagnosis Date   • Anemia    • Aneurysm ([facility] )    • Hypertension    • Migraine        Patient Active Problem List   Diagnosis   • Benign essential hypertension   • Vitamin D deficiency   • Other insomnia   • Morbid obesity with BMI of 40 0-44 9, adult ([facility] )   • Cerebral aneurysm, nonruptured   • Type 2 diabetes mellitus without complication, without long-term current use of insulin (HCC)   • H  pylori infection   • Gastroesophageal reflux disease without esophagitis   • Other constipation   • Hypokalemia   • Chronic intractable headache       Medications:      Current Outpatient Medications   Medication Sig Dispense Refill   • amitriptyline (ELAVIL) 10 mg tablet Take 3 tabs by mouth daily 90 tablet 6   • chlorthalidone 25 mg tablet Take 0 5 tablets (12 5 mg total) by mouth in the morning 180 tablet 1   • Cholecalciferol (Vitamin D3) 50 MCG (2000 UT) capsule Take 1 capsule (2,000 Units total) by mouth every morning 90 capsule 3   • losartan (COZAAR) 100 MG tablet Take 1 tablet (100 mg total) by mouth in the morning 90 tablet 3   • metFORMIN (GLUCOPHAGE) 500 mg tablet Take 1 tablet (500 mg total) by mouth 2 (two) times a day with meals 180 tablet 3   • omeprazole (PriLOSEC) 40 MG capsule Take 1 capsule (40 mg total) by mouth 2 (two) times a day before meals for 14 days 28 capsule 0   • omeprazole (PriLOSEC) 40 MG capsule Take 1 capsule (40 mg total) by mouth daily before breakfast 90 capsule 3   • polyethylene glycol (GLYCOLAX) 17 GM/SCOOP powder Take 17 g by mouth daily 255 g 0   • potassium chloride (Klor-Con) 20 mEq packet Take 20 mEq by mouth daily 30 each 1   • verapamil (VERELAN PM) 120 MG 24 hr capsule Take 1 capsule (120 mg total) by mouth in the morning 90 capsule 3     No current facility-administered medications for this visit          Allergies:    No Known Allergies    Family History:     Family History   Problem Relation Age of Onset   • Migraines Sister    • Aneurysm Sister    • No Known Problems Maternal Grandmother    • No Known Problems Maternal Grandfather    • No Known Problems Paternal Grandmother    • No Known Problems Paternal Grandfather    • Breast cancer Maternal Aunt        Social History:     Social History     Socioeconomic History   • Marital status: Legally      Spouse name: Not on file   • Number of children: Not on file   • Years of education: Not on file   • Highest education level: Not on file   Occupational History   • Not on file   Tobacco Use   • Smoking status: Never   • Smokeless tobacco: Never   Vaping Use   • Vaping Use: Never used   Substance and Sexual Activity   • "Alcohol use: Never   • Drug use: Never   • Sexual activity: Not Currently     Partners: Male   Other Topics Concern   • Not on file   Social History Narrative   • Not on file     Social Determinants of Health     Financial Resource Strain: Not on file   Food Insecurity: Not on file   Transportation Needs: Not on file   Physical Activity: Not on file   Stress: Not on file   Social Connections: Not on file   Intimate Partner Violence: Not on file   Housing Stability: Not on file         Objective:   Physical Exam:                                                               Vitals:            Constitutional:  /96 (BP Location: Left arm, Patient Position: Sitting, Cuff Size: Large)   Pulse 89   Temp 97 6 °F (36 4 °C) (Temporal)   Ht 5' 5 5\" (1 664 m)   Wt 130 kg (287 lb 11 2 oz)   LMP  (LMP Unknown)   SpO2 96%   BMI 47 15 kg/m²   BP Readings from Last 3 Encounters:   06/06/23 140/96   05/11/23 118/68   04/21/23 132/88     Pulse Readings from Last 3 Encounters:   06/06/23 89   05/11/23 72   04/21/23 86         Well developed, well nourished, well groomed  No dysmorphic features  HEENT:  Normocephalic atraumatic  See neuro exam   Chest:  Respirations appear regular and unlabored  Cardiovascular:  no observed significant swelling  Musculoskeletal:  (see below under neurologic exam for evaluation of motor function and gait)   Skin:  warm and dry, not diaphoretic  Psychiatric:  Normal behavior and appropriate affect       Neurological Examination:     Mental status/cognitive function:   Recent and remote memory intact  Attention span and concentration as well as fund of knowledge are appropriate for age  Normal language and spontaneous speech  Cranial Nerves:  III, IV, VI-Pupils were equal, round  Extraocular movements were full and conjugate   VII-facial expression symmetric  VIII-hearing grossly intact bilaterally   Motor Exam: symmetric bulk throughout   no atrophy, fasciculations or abnormal " movements noted  Coordination:  no apparent dysmetria, ataxia or tremor noted  Gait: steady casual gait  Review of Systems:   Constitutional: Negative  Negative for appetite change and fever  HENT: Negative  Negative for hearing loss, tinnitus, trouble swallowing and voice change  Eyes: Negative  Negative for photophobia, pain and visual disturbance  Respiratory: Negative  Negative for shortness of breath  Cardiovascular: Negative  Negative for palpitations  Gastrointestinal: Positive for nausea  Negative for vomiting  Endocrine: Negative  Negative for cold intolerance  Genitourinary: Negative  Negative for dysuria, frequency and urgency  Musculoskeletal: Negative  Negative for gait problem, myalgias and neck pain  Skin: Negative  Negative for rash  Allergic/Immunologic: Negative  Neurological: Positive for dizziness, light-headedness and headaches  Negative for tremors, seizures, syncope, facial asymmetry, speech difficulty, weakness and numbness  Hematological: Negative  Does not bruise/bleed easily  Psychiatric/Behavioral: Negative  Negative for confusion, hallucinations and sleep disturbance  All other systems reviewed and are negative  I have spent 20 minutes with Patient  today in which greater than 50% of this time was spent in counseling/coordination of care regarding Prognosis, Risks and benefits of tx options, Patient and family education, Importance of tx compliance, Impressions, Documenting in the medical record, Reviewing / ordering tests, medicine, procedures   and Obtaining or reviewing history    I also spent 10 minutes non face to face for this patient the same day       Activity Minutes   Precharting/reviewing 5   Patient care/counseling 20   Postcharting/care coordination 5       Author:  Edmar Kingsley DO 6/6/2023 9:58 AM

## 2023-06-15 ENCOUNTER — OFFICE VISIT (OUTPATIENT)
Dept: GASTROENTEROLOGY | Facility: MEDICAL CENTER | Age: 48
End: 2023-06-15
Payer: COMMERCIAL

## 2023-06-15 VITALS
DIASTOLIC BLOOD PRESSURE: 75 MMHG | HEART RATE: 92 BPM | SYSTOLIC BLOOD PRESSURE: 116 MMHG | BODY MASS INDEX: 47.2 KG/M2 | WEIGHT: 288 LBS | TEMPERATURE: 98 F

## 2023-06-15 DIAGNOSIS — A04.8 H. PYLORI INFECTION: ICD-10-CM

## 2023-06-15 DIAGNOSIS — K21.9 GASTROESOPHAGEAL REFLUX DISEASE WITHOUT ESOPHAGITIS: Primary | ICD-10-CM

## 2023-06-15 PROCEDURE — 99214 OFFICE O/P EST MOD 30 MIN: CPT | Performed by: INTERNAL MEDICINE

## 2023-06-15 RX ORDER — FAMOTIDINE 40 MG/1
40 TABLET, FILM COATED ORAL
Qty: 30 TABLET | Refills: 3 | Status: SHIPPED | OUTPATIENT
Start: 2023-06-15 | End: 2023-06-15

## 2023-06-15 RX ORDER — OMEPRAZOLE 40 MG/1
40 CAPSULE, DELAYED RELEASE ORAL
Qty: 90 CAPSULE | Refills: 3 | Status: SHIPPED | OUTPATIENT
Start: 2023-06-15

## 2023-06-15 RX ORDER — FAMOTIDINE 40 MG/1
40 TABLET, FILM COATED ORAL
Qty: 90 TABLET | Refills: 3 | Status: SHIPPED | OUTPATIENT
Start: 2023-06-15

## 2023-06-15 NOTE — PATIENT INSTRUCTIONS
GERD (Gastroesophageal Reflux Disease)   AMBULATORY CARE:   Gastroesophageal reflux disease (GERD)  is reflux that happens more than 2 times a week for a few weeks  Reflux means acid and food in your stomach back up into your esophagus  GERD can cause other health problems over time if it is not treated  Common causes of GERD:  GERD often happens because the lower muscle (sphincter) of the esophagus does not close properly  The sphincter normally opens to let food into the stomach  It then closes to keep food and stomach acid in the stomach  If the sphincter does not close properly, stomach acid and food back up (reflux) into the esophagus  The following may increase your risk for GERD:  Certain foods such as spicy foods, chocolate, foods that contain caffeine, peppermint, and fried foods    Hiatal hernia    Certain medicines such as calcium channel blockers (used to treat high blood pressure), allergy medicines, sedatives, or antidepressants    Pregnancy, obesity, or scleroderma    Lying down after a meal    Drinking alcohol or smoking cigarettes    Signs and symptoms:   Heartburn (burning pain in your chest)    Pain after meals that spreads to your neck, jaw, or shoulder    Pain that gets better when you change positions    Bitter or acid taste in your mouth    A dry cough    Trouble swallowing or pain with swallowing    Hoarseness or a sore throat    Burping or hiccups    Feeling full soon after you start eating    Call your local emergency number (911 in the [address] [facility]) if:   You have severe chest pain and sudden trouble breathing  Seek care immediately if:   You have trouble breathing after you vomit  You have trouble swallowing, or pain with swallowing  Your bowel movements are black, bloody, or tarry-looking  Your vomit looks like coffee grounds or has blood in it  Call your doctor or gastroenterologist if:   You feel full and cannot burp or vomit      You vomit large amounts, or you vomit often     You are losing weight without trying  Your symptoms get worse or do not improve with treatment  You have questions or concerns about your condition or care  Treatment for GERD:   Medicines  are used to decrease stomach acid  Medicine may also be used to help your lower esophageal sphincter and stomach contract (tighten) more  Surgery  is done to wrap the upper part of the stomach around the esophageal sphincter  This will strengthen the sphincter and prevent reflux  Manage GERD:       Do not have foods or drinks that may increase heartburn  These include chocolate, peppermint, fried or fatty foods, drinks that contain caffeine, or carbonated drinks (soda)  Other foods include spicy foods, onions, tomatoes, and tomato-based foods  Do not have foods or drinks that can irritate your esophagus, such as citrus fruits, juices, and alcohol  Do not eat large meals  When you eat a lot of food at one time, your stomach needs more acid to digest it  Eat 6 small meals each day instead of 3 large meals, and eat slowly  Do not eat meals 2 to 3 hours before bedtime  Elevate the head of your bed  Place 6-inch blocks under the head of your bed frame  You may also use more than one pillow under your head and shoulders while you sleep  Maintain a healthy weight  If you are overweight, weight loss may help relieve symptoms of GERD  Do not smoke  Smoking weakens the lower esophageal sphincter and increases the risk of GERD  Ask your healthcare provider for information if you currently smoke and need help to quit  E-cigarettes or smokeless tobacco still contain nicotine  Talk to your healthcare provider before you use these products  Do not put pressure on your abdomen  Pressure pushes acid up into your esophagus  Do not wear clothing that is tight around your waist  Do not bend over  Bend at the knees if you need to pick something up    Follow up with your doctor or gastroenterologist as directed:  Write down your questions so you remember to ask them during your visits  © Copyright [employer] 2022 Information is for End User's use only and may not be sold, redistributed or otherwise used for commercial purposes  The above information is an  only  It is not intended as medical advice for individual conditions or treatments  Talk to your doctor, nurse or pharmacist before following any medical regimen to see if it is safe and effective for you

## 2023-06-15 NOTE — PROGRESS NOTES
[facility] - Outpatient Follow-up Note  Cole Weiss 56 y o  female MRN: [id number]  Encounter: [id number]          ASSESSMENT AND PLAN:  55-year-old female with history of hypertension, type 2 diabetes cerebral aneurysm status post clipping 2018, GERD who presents for follow-up evaluation  1  Gastroesophageal reflux disease without esophagitis  She has history of chronic reflux  She underwent EGD showing small hiatal hernia in February 2023  She has continued to take omeprazole 40 mg daily but continues to have breakthrough symptoms  I recommend adding nighttime H2 blocker therapy with Pepcid 40 mg before bedtime  She will also continue dietary/lifestyle antireflux measures  She also has history of type 2 diabetes we discussed symptoms could be related to gastroparesis given her intermittent bloating  If she has ongoing symptoms despite acid suppression therapy consider gastric emptying study in the future  - omeprazole (PriLOSEC) 40 MG capsule; Take 1 capsule (40 mg total) by mouth daily before breakfast  Dispense: 90 capsule; Refill: 3  - famotidine (PEPCID) 40 MG tablet; Take 1 tablet (40 mg total) by mouth daily at bedtime  Dispense: 90 tablet; Refill: 3    2  H  pylori infection  Her February 2023 gastric biopsies were positive for H  pylori  She completed an antibiotic course and subsequent stool antigen testing was negative confirming eradication of the infection  Follow-up in 4 months    ______________________________________________________________________    SUBJECTIVE: 55-year-old female with history of hypertension, type 2 diabetes cerebral aneurysm status post clipping 2018, GERD who presents for follow-up evaluation  She was last seen in the GI office March 2023  She had previously undergone EGD and colonoscopy in February 2023  This showed irregular Z-line, small hiatal hernia    Colonoscopy noted 2 subcentimeter colon polyps, pancolonic diverticulosis and small hemorrhoids otherwise unremarkable  Gastric biopsies were positive for H  pylori and she was started on antibiotic treatment  Subsequent stool antigen testing was negative  Colon polyp showed sessile serrated adenoma she was recommended repeat in 5 years  Interval history: She has been taking omeprazole 40 mg in the morning  She continues to have reflux symptoms after eating and at night  There are no specific trigger foods  She reports bloating but denies early satiety  She reports regular, formed bowel movements without melena or hematochezia      April 2023 hemoglobin A1c 7 2, liver enzymes within normal limits, hemoglobin 12,         REVIEW OF SYSTEMS IS OTHERWISE NEGATIVE    10 point review of systems is negative other than stated as per HPI    Historical Information   Past Medical History:   Diagnosis Date   • Anemia    • Aneurysm ([address] )    • Hypertension    • Migraine      Past Surgical History:   Procedure Laterality Date   • BREAST CYST EXCISION Right 2021   • HYSTERECTOMY     • IR CEREBRAL ANGIOGRAPHY  1/6/2023     Social History   Social History     Substance and Sexual Activity   Alcohol Use Never     Social History     Substance and Sexual Activity   Drug Use Never     Social History     Tobacco Use   Smoking Status Never   Smokeless Tobacco Never     Family History   Problem Relation Age of Onset   • Migraines Sister    • Aneurysm Sister    • No Known Problems Maternal Grandmother    • No Known Problems Maternal Grandfather    • No Known Problems Paternal Grandmother    • No Known Problems Paternal Grandfather    • Breast cancer Maternal Aunt        Meds/Allergies       Current Outpatient Medications:   •  amitriptyline (ELAVIL) 50 mg tablet  •  chlorthalidone 25 mg tablet  •  Cholecalciferol (Vitamin D3) 50 MCG (2000 UT) capsule  •  losartan (COZAAR) 100 MG tablet  •  metFORMIN (GLUCOPHAGE) 500 mg tablet  •  omeprazole (PriLOSEC) 40 MG capsule  •  omeprazole (PriLOSEC) 40 MG capsule  •  polyethylene glycol (GLYCOLAX) 17 GM/SCOOP powder  •  potassium chloride (Klor-Con) 20 mEq packet  •  rimegepant sulfate (Nurtec) 75 mg TBDP  •  verapamil (VERELAN PM) 120 MG 24 hr capsule    No Known Allergies        Objective     Blood pressure 116/75, pulse 92, temperature 98 °F (36 7 °C), temperature source Tympanic, weight 131 kg (288 lb)  Body mass index is 47 2 kg/m²  PHYSICAL EXAM:      General Appearance:   Alert, cooperative, no distress   HEENT:   Normocephalic, atraumatic, anicteric  Neck:  Supple, symmetrical, trachea midline   Lungs:   Clear to auscultation bilaterally; no rales, rhonchi or wheezing; respirations unlabored    Heart[de-identified]   Regular rate and rhythm; no murmur, rub, or gallop  Abdomen:   Soft, non-tender, non-distended; normal bowel sounds; no masses, no organomegaly    Genitalia:   Deferred    Rectal:   Deferred    Extremities:  No cyanosis, clubbing or edema    Pulses:  2+ and symmetric    Skin:  No jaundice, rashes, or lesions    Lymph nodes:  No palpable cervical lymphadenopathy        Lab Results:   No visits with results within 1 Day(s) from this visit  Latest known visit with results is:   Appointment on 05/04/2023   Component Date Value   • Sodium 05/04/2023 136    • Potassium 05/04/2023 3 5    • Chloride 05/04/2023 97    • CO2 05/04/2023 31    • ANION GAP 05/04/2023 8    • BUN 05/04/2023 15    • Creatinine 05/04/2023 0 72    • Glucose 05/04/2023 99    • Calcium 05/04/2023 9 4    • eGFR 05/04/2023 100          Radiology Results:   No results found

## 2023-06-16 ENCOUNTER — TELEPHONE (OUTPATIENT)
Dept: NEUROLOGY | Facility: CLINIC | Age: 48
End: 2023-06-16

## 2023-06-16 NOTE — TELEPHONE ENCOUNTER
Received request for medical records from Sarah scanned into  and faxed to [employer]

## 2023-06-27 ENCOUNTER — TELEPHONE (OUTPATIENT)
Dept: FAMILY MEDICINE CLINIC | Facility: CLINIC | Age: 48
End: 2023-06-27

## 2023-06-27 NOTE — TELEPHONE ENCOUNTER
Pt called stating she had fax'd an FMLA form to us on Friday 6/23  Told her we had not received it, pt said she would refax it  Asked that we call her once completed

## 2023-07-21 ENCOUNTER — OFFICE VISIT (OUTPATIENT)
Dept: OBGYN CLINIC | Facility: MEDICAL CENTER | Age: 48
End: 2023-07-21

## 2023-07-21 ENCOUNTER — TELEPHONE (OUTPATIENT)
Dept: SLEEP CENTER | Facility: CLINIC | Age: 48
End: 2023-07-21

## 2023-07-21 VITALS
BODY MASS INDEX: 47.06 KG/M2 | SYSTOLIC BLOOD PRESSURE: 114 MMHG | HEART RATE: 92 BPM | WEIGHT: 292.8 LBS | DIASTOLIC BLOOD PRESSURE: 73 MMHG | HEIGHT: 66 IN

## 2023-07-21 DIAGNOSIS — M17.0 BILATERAL PRIMARY OSTEOARTHRITIS OF KNEE: Primary | ICD-10-CM

## 2023-07-21 RX ORDER — TRIAMCINOLONE ACETONIDE 40 MG/ML
40 INJECTION, SUSPENSION INTRA-ARTICULAR; INTRAMUSCULAR
Status: COMPLETED | OUTPATIENT
Start: 2023-07-21 | End: 2023-07-21

## 2023-07-21 RX ORDER — BUPIVACAINE HYDROCHLORIDE 2.5 MG/ML
2 INJECTION, SOLUTION INFILTRATION; PERINEURAL
Status: COMPLETED | OUTPATIENT
Start: 2023-07-21 | End: 2023-07-21

## 2023-07-21 RX ADMIN — BUPIVACAINE HYDROCHLORIDE 2 ML: 2.5 INJECTION, SOLUTION INFILTRATION; PERINEURAL at 15:45

## 2023-07-21 RX ADMIN — TRIAMCINOLONE ACETONIDE 40 MG: 40 INJECTION, SUSPENSION INTRA-ARTICULAR; INTRAMUSCULAR at 15:45

## 2023-07-21 NOTE — TELEPHONE ENCOUNTER
----- Message from Agueda Virgen MD sent at 7/21/2023  2:02 PM EDT -----  approved  ----- Message -----  From: Rowdy Chavez  Sent: 7/20/2023   8:44 AM EDT  To: Sleep Medicine [facility] Provider

## 2023-07-21 NOTE — PROGRESS NOTES
Assessment/Plan:  1. Bilateral primary osteoarthritis of knee      Orders Placed This Encounter   Procedures   • Large joint arthrocentesis: bilateral knee       · Patient has severe bilateral knee osteoarthritis. · Patient received bilateral knee steroid injections today. Tolerated the procedures well. Postinjection instructions reviewed. · Patient is not a surgical candidate at this time due to elevated BMI  · Tylenol as needed up to 3000 mg/day. · Continue activity as tolerated. · Patient is happy with the results that she gets from steroid injections for the time being. Return in about 3 months (around 10/21/2023). I answered all of the patient's questions during the visit and provided education of the patient's condition during the visit. The patient verbalized understanding of the information given and agrees with the plan. This note was dictated using LibriLoop software. It may contain errors including improperly dictated words. Please contact physician directly for any questions. Subjective   Chief Complaint:   Chief Complaint   Patient presents with   • Right Knee - Follow-up   • Left Knee - Follow-up     [facility]  Catrina Puentes is a 56 y.o. female who presents for follow up for bilateral knee pain. Patient received bilateral knee steroid injections at her last visit on 04/20/2023 and reports good pain relief from this injection. Patient reports the return of bilateral knee pain, right worse than left. 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 Patient not currently doing any physical therapy or home exercises. Patient would like repeat bilateral knee steroid injections today. Review of Systems  ROS:    See HPI for musculoskeletal review.    All other systems reviewed are negative     History:  Past Medical History:   Diagnosis Date   • Anemia    • Aneurysm ([address])    • Hypertension    • Migraine      Past Surgical History:   Procedure Laterality Date   • BREAST CYST EXCISION Right 2021   • HYSTERECTOMY     • IR CEREBRAL ANGIOGRAPHY  1/6/2023     Social History   Social History     Substance and Sexual Activity   Alcohol Use Never     Social History     Substance and Sexual Activity   Drug Use Never     Social History     Tobacco Use   Smoking Status Never   Smokeless Tobacco Never     Family History:   Family History   Problem Relation Age of Onset   • Migraines Sister    • Aneurysm Sister    • No Known Problems Maternal Grandmother    • No Known Problems Maternal Grandfather    • No Known Problems Paternal Grandmother    • No Known Problems Paternal Grandfather    • Breast cancer Maternal Aunt        Current Outpatient Medications on File Prior to Visit   Medication Sig Dispense Refill   • amitriptyline (ELAVIL) 50 mg tablet Take 1 tablet (50 mg total) by mouth daily at bedtime 90 tablet 3   • chlorthalidone 25 mg tablet Take 0.5 tablets (12.5 mg total) by mouth in the morning 180 tablet 1   • Cholecalciferol (Vitamin D3) 50 MCG (2000 UT) capsule Take 1 capsule (2,000 Units total) by mouth every morning 90 capsule 3   • famotidine (PEPCID) 40 MG tablet Take 1 tablet (40 mg total) by mouth daily at bedtime 90 tablet 3   • losartan (COZAAR) 100 MG tablet Take 1 tablet (100 mg total) by mouth in the morning 90 tablet 3   • metFORMIN (GLUCOPHAGE) 500 mg tablet Take 1 tablet (500 mg total) by mouth 2 (two) times a day with meals 180 tablet 3   • omeprazole (PriLOSEC) 40 MG capsule Take 1 capsule (40 mg total) by mouth daily before breakfast 90 capsule 3   • verapamil (VERELAN PM) 120 MG 24 hr capsule Take 1 capsule (120 mg total) by mouth in the morning 90 capsule 3   • potassium chloride (Klor-Con) 20 mEq packet Take 20 mEq by mouth daily 30 each 1   • rimegepant sulfate (Nurtec) 75 mg TBDP Take one NURTEC 75 mg at onset under tongue.  Limit 1 in 24 hours (Patient not taking: Reported on 6/15/2023) 16 tablet 3     No current facility-administered medications on file prior to visit. No Known Allergies     Objective     /73   Pulse 92   Ht 5' 5.5" (1.664 m)   Wt 133 kg (292 lb 12.8 oz)   LMP  (LMP Unknown)   BMI 47.98 kg/m²      PE:  AAOx 3  WDWN  Hearing intact, no drainage from eyes  no audible wheezing  no abdominal distension  LE compartments soft, skin intact    Ortho Exam:  bilateral Knee:   No erythema  no swelling  no effusion  no warmth  AROM: 0- 95  Stable to varus/valgus stress      Large joint arthrocentesis: bilateral knee  Universal Protocol:  Consent: Verbal consent obtained. Risks and benefits: risks, benefits and alternatives were discussed  Consent given by: patient  Time out: Immediately prior to procedure a "time out" was called to verify the correct patient, procedure, equipment, support staff and site/side marked as required.   Timeout called at: 7/21/2023 4:08 PM.  Patient understanding: patient states understanding of the procedure being performed  Site marked: the operative site was marked  Patient identity confirmed: verbally with patient    Supporting Documentation  Indications: pain and diagnostic evaluation   Procedure Details  Location: knee - bilateral knee  Needle size: 22 G  Ultrasound guidance: no  Approach: anterolateral    Medications (Right): 2 mL bupivacaine 0.25 %; 40 mg triamcinolone acetonide 40 mg/mLMedications (Left): 2 mL bupivacaine 0.25 %; 40 mg triamcinolone acetonide 40 mg/mL   Patient tolerance: patient tolerated the procedure well with no immediate complications  Dressing:  Sterile dressing applied

## 2023-07-26 ENCOUNTER — APPOINTMENT (OUTPATIENT)
Dept: LAB | Facility: CLINIC | Age: 48
End: 2023-07-26
Payer: COMMERCIAL

## 2023-07-26 ENCOUNTER — OFFICE VISIT (OUTPATIENT)
Dept: FAMILY MEDICINE CLINIC | Facility: CLINIC | Age: 48
End: 2023-07-26
Payer: COMMERCIAL

## 2023-07-26 VITALS
TEMPERATURE: 96.4 F | DIASTOLIC BLOOD PRESSURE: 84 MMHG | SYSTOLIC BLOOD PRESSURE: 122 MMHG | RESPIRATION RATE: 16 BRPM | HEART RATE: 92 BPM | HEIGHT: 66 IN | WEIGHT: 287.8 LBS | BODY MASS INDEX: 46.25 KG/M2

## 2023-07-26 DIAGNOSIS — E11.9 TYPE 2 DIABETES MELLITUS WITHOUT COMPLICATION, WITHOUT LONG-TERM CURRENT USE OF INSULIN (HCC): ICD-10-CM

## 2023-07-26 DIAGNOSIS — I10 BENIGN ESSENTIAL HYPERTENSION: ICD-10-CM

## 2023-07-26 DIAGNOSIS — G89.29 CHRONIC INTRACTABLE HEADACHE, UNSPECIFIED HEADACHE TYPE: ICD-10-CM

## 2023-07-26 DIAGNOSIS — E87.6 HYPOKALEMIA: ICD-10-CM

## 2023-07-26 DIAGNOSIS — E66.01 MORBID OBESITY WITH BMI OF 40.0-44.9, ADULT (HCC): ICD-10-CM

## 2023-07-26 DIAGNOSIS — R51.9 CHRONIC INTRACTABLE HEADACHE, UNSPECIFIED HEADACHE TYPE: ICD-10-CM

## 2023-07-26 DIAGNOSIS — Z00.00 ANNUAL PHYSICAL EXAM: Primary | ICD-10-CM

## 2023-07-26 LAB
ALBUMIN SERPL BCP-MCNC: 3.4 G/DL (ref 3.5–5)
ALP SERPL-CCNC: 90 U/L (ref 46–116)
ALT SERPL W P-5'-P-CCNC: 20 U/L (ref 12–78)
ANION GAP SERPL CALCULATED.3IONS-SCNC: 7 MMOL/L
AST SERPL W P-5'-P-CCNC: 10 U/L (ref 5–45)
BILIRUB SERPL-MCNC: 0.57 MG/DL (ref 0.2–1)
BUN SERPL-MCNC: 13 MG/DL (ref 5–25)
CALCIUM ALBUM COR SERPL-MCNC: 9.8 MG/DL (ref 8.3–10.1)
CALCIUM SERPL-MCNC: 9.3 MG/DL (ref 8.3–10.1)
CHLORIDE SERPL-SCNC: 102 MMOL/L (ref 96–108)
CHOLEST SERPL-MCNC: 157 MG/DL
CO2 SERPL-SCNC: 28 MMOL/L (ref 21–32)
CREAT SERPL-MCNC: 0.91 MG/DL (ref 0.6–1.3)
CREAT UR-MCNC: 303 MG/DL
EST. AVERAGE GLUCOSE BLD GHB EST-MCNC: 151 MG/DL
GFR SERPL CREATININE-BSD FRML MDRD: 75 ML/MIN/1.73SQ M
GLUCOSE P FAST SERPL-MCNC: 95 MG/DL (ref 65–99)
HBA1C MFR BLD: 6.9 %
HDLC SERPL-MCNC: 47 MG/DL
LDLC SERPL CALC-MCNC: 88 MG/DL (ref 0–100)
MICROALBUMIN UR-MCNC: 60.7 MG/L (ref 0–20)
MICROALBUMIN/CREAT 24H UR: 20 MG/G CREATININE (ref 0–30)
POTASSIUM SERPL-SCNC: 3.6 MMOL/L (ref 3.5–5.3)
PROT SERPL-MCNC: 7.8 G/DL (ref 6.4–8.4)
SODIUM SERPL-SCNC: 137 MMOL/L (ref 135–147)
TRIGL SERPL-MCNC: 109 MG/DL

## 2023-07-26 PROCEDURE — 99214 OFFICE O/P EST MOD 30 MIN: CPT | Performed by: NURSE PRACTITIONER

## 2023-07-26 PROCEDURE — 82043 UR ALBUMIN QUANTITATIVE: CPT

## 2023-07-26 PROCEDURE — 99396 PREV VISIT EST AGE 40-64: CPT | Performed by: NURSE PRACTITIONER

## 2023-07-26 PROCEDURE — 83036 HEMOGLOBIN GLYCOSYLATED A1C: CPT

## 2023-07-26 PROCEDURE — 36415 COLL VENOUS BLD VENIPUNCTURE: CPT

## 2023-07-26 PROCEDURE — 80053 COMPREHEN METABOLIC PANEL: CPT

## 2023-07-26 PROCEDURE — 82570 ASSAY OF URINE CREATININE: CPT

## 2023-07-26 PROCEDURE — 80061 LIPID PANEL: CPT

## 2023-07-26 RX ORDER — POTASSIUM CHLORIDE 1.5 G/1.58G
20 POWDER, FOR SOLUTION ORAL DAILY
Qty: 30 EACH | Refills: 6 | Status: SHIPPED | OUTPATIENT
Start: 2023-07-26

## 2023-07-26 NOTE — LETTER
July 26, 2023     Patient: Myron Good  YOB: 1975  Date of Visit: 7/26/2023      To Whom it May Concern:    [place] is under my professional care. Delgado Landis was seen in my office on 7/26/2023. Delgado Landis may return to work on 7/28/2023  with no restrictions. It is recommended that she can transition to a dayshift position to help maintain control of her migraine headaches. If you have any questions or concerns, please don't hesitate to call.          Sincerely,          HARSHAD Cochran        CC: No Recipients

## 2023-07-26 NOTE — ASSESSMENT & PLAN NOTE
Better controlled now that she has had some time off work. She is worried about returning because she would have to work nightshift. Letter written again to see if we can help support a transition to dayshift due to this.

## 2023-07-26 NOTE — ASSESSMENT & PLAN NOTE
Patient should continue her potasium supplement. Labs were just completed this morning results are still pending. She is on 20meq daily.

## 2023-07-26 NOTE — ASSESSMENT & PLAN NOTE
She is currently on metformin 500mg BID. Her A1c is currently in progress.      Lab Results   Component Value Date    HGBA1C 7.2 (H) 04/28/2023

## 2023-07-26 NOTE — PROGRESS NOTES
[facility] PRIMARY CARE    NAME: Kishore Hilliard  AGE: 56 y.o. SEX: female  : 1975     DATE: 2023     Assessment and Plan:     Problem List Items Addressed This Visit        Endocrine    Type 2 diabetes mellitus without complication, without long-term current use of insulin ([facility])     She is currently on metformin 500mg BID. Her A1c is currently in progress. Lab Results   Component Value Date    HGBA1C 7.2 (H) 2023               Cardiovascular and Mediastinum    Benign essential hypertension     Patient blood pressure is very well controlled. No changes to medications made today             Other    Hypokalemia     Patient should continue her potasium supplement. Labs were just completed this morning results are still pending. She is on 20meq daily. Relevant Medications    potassium chloride (Klor-Con) 20 mEq packet    Chronic intractable headache     Better controlled now that she has had some time off work. She is worried about returning because she would have to work nightshift. Letter written again to see if we can help support a transition to dayshift due to this. Other Visit Diagnoses     Annual physical exam    -  Primary          Immunizations and preventive care screenings were discussed with patient today. Appropriate education was printed on patient's after visit summary. Counseling:  Alcohol/drug use: discussed moderation in alcohol intake, the recommendations for healthy alcohol use, and avoidance of illicit drug use. Dental Health: discussed importance of regular tooth brushing, flossing, and dental visits. Injury prevention: discussed safety/seat belts, safety helmets, smoke detectors, carbon dioxide detectors, and smoking near bedding or upholstery.   Sexual health: discussed sexually transmitted diseases, partner selection, use of condoms, avoidance of unintended pregnancy, and contraceptive alternatives. Exercise: the importance of regular exercise/physical activity was discussed. Recommend exercise 3-5 times per week for at least 30 minutes. Return for november with MS. Chief Complaint:     Chief Complaint   Patient presents with   • Physical Exam     Patient in office for annual check up      History of Present Illness:     Adult Annual Physical   Patient here for a comprehensive physical exam. The patient reports problems - as below. patient A1c continue to slowly increase last was 7.2% in april  she was started on metformin at that time as well  her cholesterol has made siginificant improvements but since she is diabetic her LDL is not at goal. last   low potassium patient was started on 20mew daily of potassium and her last potassium in may was 3.5  Patient was take out of work because of her migraines. She has been out of work for 3 months. Her pcp took her out of work. They attempted to ask for dayshift but the employer did not accommodate. since she been out of work and sleeping regularly her migraines headaches have been better controlled as well as she had been working with neurology and neurosurgery. Diet and Physical Activity  Diet/Nutrition: poor diet. Exercise: walking. Depression Screening  PHQ-2/9 Depression Screening    Little interest or pleasure in doing things: 0 - not at all  Feeling down, depressed, or hopeless: 0 - not at all  PHQ-2 Score: 0  PHQ-2 Interpretation: Negative depression screen       General Health  Sleep: sleeps well. Hearing: normal - bilateral.  Vision: no vision problems and most recent eye exam <1 year ago. Dental: no dental visits for >1 year. /GYN Health  Patient is: hysterectomy        Review of Systems:     Review of Systems   Constitutional: Negative for activity change, appetite change and fatigue. Respiratory: Negative for chest tightness and shortness of breath.     Cardiovascular: Negative for chest pain, palpitations and leg swelling. Gastrointestinal: Negative for abdominal pain. Genitourinary: Negative for difficulty urinating. Neurological: Positive for headaches (but improved). Negative for dizziness and light-headedness. Psychiatric/Behavioral: Negative for dysphoric mood and sleep disturbance. The patient is not nervous/anxious.        Past Medical History:     Past Medical History:   Diagnosis Date   • Anemia    • Aneurysm ([address])    • Hypertension    • Migraine       Past Surgical History:     Past Surgical History:   Procedure Laterality Date   • BREAST CYST EXCISION Right 2021   • HYSTERECTOMY     • IR CEREBRAL ANGIOGRAPHY  1/6/2023      Social History:     Social History     Socioeconomic History   • Marital status:      Spouse name: None   • Number of children: None   • Years of education: None   • Highest education level: None   Occupational History   • None   Tobacco Use   • Smoking status: Never   • Smokeless tobacco: Never   Vaping Use   • Vaping Use: Never used   Substance and Sexual Activity   • Alcohol use: Never   • Drug use: Never   • Sexual activity: Not Currently     Partners: Male   Other Topics Concern   • None   Social History Narrative   • None     Social Determinants of Health     Financial Resource Strain: Not on file   Food Insecurity: Not on file   Transportation Needs: Not on file   Physical Activity: Not on file   Stress: Not on file   Social Connections: Not on file   Intimate Partner Violence: Not on file   Housing Stability: Not on file      Family History:     Family History   Problem Relation Age of Onset   • Migraines Sister    • Aneurysm Sister    • No Known Problems Maternal Grandmother    • No Known Problems Maternal Grandfather    • No Known Problems Paternal Grandmother    • No Known Problems Paternal Grandfather    • Breast cancer Maternal Aunt       Current Medications:     Current Outpatient Medications   Medication Sig Dispense Refill   • amitriptyline (ELAVIL) 50 mg tablet Take 1 tablet (50 mg total) by mouth daily at bedtime 90 tablet 3   • chlorthalidone 25 mg tablet Take 0.5 tablets (12.5 mg total) by mouth in the morning 180 tablet 1   • Cholecalciferol (Vitamin D3) 50 MCG (2000 UT) capsule Take 1 capsule (2,000 Units total) by mouth every morning 90 capsule 3   • famotidine (PEPCID) 40 MG tablet Take 1 tablet (40 mg total) by mouth daily at bedtime 90 tablet 3   • losartan (COZAAR) 100 MG tablet Take 1 tablet (100 mg total) by mouth in the morning 90 tablet 3   • metFORMIN (GLUCOPHAGE) 500 mg tablet Take 1 tablet (500 mg total) by mouth 2 (two) times a day with meals 180 tablet 3   • omeprazole (PriLOSEC) 40 MG capsule Take 1 capsule (40 mg total) by mouth daily before breakfast 90 capsule 3   • potassium chloride (Klor-Con) 20 mEq packet Take 20 mEq by mouth daily 30 each 6   • verapamil (VERELAN PM) 120 MG 24 hr capsule Take 1 capsule (120 mg total) by mouth in the morning 90 capsule 3   • rimegepant sulfate (Nurtec) 75 mg TBDP Take one NURTEC 75 mg at onset under tongue. Limit 1 in 24 hours (Patient not taking: Reported on 6/15/2023) 16 tablet 3     No current facility-administered medications for this visit. Allergies:     No Known Allergies   Physical Exam:     /84 (BP Location: Right arm, Patient Position: Sitting, Cuff Size: Adult)   Pulse 92   Temp (!) 96.4 °F (35.8 °C) (Temporal)   Resp 16   Ht 5' 5.5" (1.664 m)   Wt 131 kg (287 lb 12.8 oz)   LMP  (LMP Unknown)   BMI 47.16 kg/m²     Physical Exam  Vitals and nursing note reviewed. Constitutional:       General: She is not in acute distress. Appearance: Normal appearance. She is well-developed. She is morbidly obese. She is not diaphoretic. HENT:      Head: Normocephalic and atraumatic.       Right Ear: Tympanic membrane and external ear normal.      Left Ear: Tympanic membrane and external ear normal.      Nose: Nose normal.      Mouth/Throat:      Mouth: Mucous membranes are moist.      Pharynx: No oropharyngeal exudate or posterior oropharyngeal erythema. Eyes:      Extraocular Movements: Extraocular movements intact. Conjunctiva/sclera: Conjunctivae normal.      Pupils: Pupils are equal, round, and reactive to light. Neck:      Thyroid: No thyromegaly. Vascular: No carotid bruit or JVD. Cardiovascular:      Rate and Rhythm: Normal rate and regular rhythm. Pulses:           Carotid pulses are 2+ on the right side and 2+ on the left side. Heart sounds: Normal heart sounds, S1 normal and S2 normal. No murmur heard. Pulmonary:      Effort: Pulmonary effort is normal.      Breath sounds: Normal breath sounds. Abdominal:      General: Bowel sounds are normal.      Palpations: Abdomen is soft. Tenderness: There is no abdominal tenderness. Musculoskeletal:         General: Normal range of motion. Cervical back: Normal range of motion. Right lower leg: No edema. Left lower leg: No edema. Lymphadenopathy:      Cervical: No cervical adenopathy. Skin:     General: Skin is warm. Capillary Refill: Capillary refill takes less than 2 seconds. Neurological:      Mental Status: She is alert and oriented to person, place, and time. Motor: Motor function is intact. Coordination: Coordination is intact. Gait: Gait is intact. Psychiatric:         Mood and Affect: Mood normal.         Behavior: Behavior normal.         Thought Content:  Thought content normal.         Judgment: Judgment normal.          HARSHAD Rutledge  [facility] PRIMARY CARE

## 2023-07-27 ENCOUNTER — TELEPHONE (OUTPATIENT)
Dept: ADMINISTRATIVE | Facility: OTHER | Age: 48
End: 2023-07-27

## 2023-07-27 NOTE — TELEPHONE ENCOUNTER
Upon review of the In Basket request we see 12/01/2022 note documents "pt understands paps are not necessary after hyst." and "Cervox, uterus absent". Any additional questions or concerns should be emailed to the Practice Liaisons via the appropriate education email address, please do not reply via In Basket.     Thank you  Maegan Ness MA

## 2023-07-27 NOTE — TELEPHONE ENCOUNTER
----- Message from Minoo Ham sent at 7/26/2023  2:59 PM EDT -----  Regarding: care gap request  07/26/23 2:59 PM    Hello, our patient attached above has hadPap Smear (HPV) aka Cervical Cancer Screening completed/performed. Please assist in updating the patient chart by pulling the document from encounters Tab within Chart Review. The date of service is 12/1/2022.      Thank you,    Fely méndez A   [facility]

## 2023-08-28 ENCOUNTER — TELEPHONE (OUTPATIENT)
Dept: FAMILY MEDICINE CLINIC | Facility: CLINIC | Age: 48
End: 2023-08-28

## 2023-08-28 DIAGNOSIS — Z12.31 ENCOUNTER FOR SCREENING MAMMOGRAM FOR MALIGNANT NEOPLASM OF BREAST: Primary | ICD-10-CM

## 2023-09-04 ENCOUNTER — HOSPITAL ENCOUNTER (EMERGENCY)
Facility: HOSPITAL | Age: 48
Discharge: HOME | End: 2023-09-04
Attending: EMERGENCY MEDICINE | Admitting: EMERGENCY MEDICINE
Payer: COMMERCIAL

## 2023-09-04 ENCOUNTER — APPOINTMENT (EMERGENCY)
Dept: RADIOLOGY | Facility: HOSPITAL | Age: 48
End: 2023-09-04
Payer: COMMERCIAL

## 2023-09-04 VITALS
RESPIRATION RATE: 18 BRPM | SYSTOLIC BLOOD PRESSURE: 155 MMHG | DIASTOLIC BLOOD PRESSURE: 76 MMHG | OXYGEN SATURATION: 95 % | TEMPERATURE: 98 F | HEART RATE: 78 BPM

## 2023-09-04 DIAGNOSIS — R51.9 HEADACHE: Primary | ICD-10-CM

## 2023-09-04 DIAGNOSIS — I10 HYPERTENSION: ICD-10-CM

## 2023-09-04 DIAGNOSIS — R53.1 WEAKNESS: ICD-10-CM

## 2023-09-04 LAB
ALBUMIN SERPL BCP-MCNC: 3.6 G/DL (ref 3.5–5)
ALP SERPL-CCNC: 70 U/L (ref 34–104)
ALT SERPL W P-5'-P-CCNC: 18 U/L (ref 7–52)
ANION GAP SERPL CALCULATED.3IONS-SCNC: 8 MMOL/L
AST SERPL W P-5'-P-CCNC: 16 U/L (ref 13–39)
BASOPHILS # BLD AUTO: 0.03 THOUSANDS/ÂΜL (ref 0–0.1)
BASOPHILS NFR BLD AUTO: 1 % (ref 0–1)
BILIRUB SERPL-MCNC: 0.33 MG/DL (ref 0.2–1)
BUN SERPL-MCNC: 10 MG/DL (ref 5–25)
CALCIUM SERPL-MCNC: 8.8 MG/DL (ref 8.4–10.2)
CHLORIDE SERPL-SCNC: 103 MMOL/L (ref 96–108)
CO2 SERPL-SCNC: 28 MMOL/L (ref 21–32)
CREAT SERPL-MCNC: 0.69 MG/DL (ref 0.6–1.3)
EOSINOPHIL # BLD AUTO: 0.17 THOUSAND/ÂΜL (ref 0–0.61)
EOSINOPHIL NFR BLD AUTO: 3 % (ref 0–6)
ERYTHROCYTE [DISTWIDTH] IN BLOOD BY AUTOMATED COUNT: 16.4 % (ref 11.6–15.1)
GFR SERPL CREATININE-BSD FRML MDRD: 104 ML/MIN/1.73SQ M
GLUCOSE SERPL-MCNC: 97 MG/DL (ref 65–140)
HCT VFR BLD AUTO: 39 % (ref 34.8–46.1)
HGB BLD-MCNC: 11.7 G/DL (ref 11.5–15.4)
IMM GRANULOCYTES # BLD AUTO: 0.02 THOUSAND/UL (ref 0–0.2)
IMM GRANULOCYTES NFR BLD AUTO: 0 % (ref 0–2)
LYMPHOCYTES # BLD AUTO: 1.95 THOUSANDS/ÂΜL (ref 0.6–4.47)
LYMPHOCYTES NFR BLD AUTO: 31 % (ref 14–44)
MCH RBC QN AUTO: 23.7 PG (ref 26.8–34.3)
MCHC RBC AUTO-ENTMCNC: 30 G/DL (ref 31.4–37.4)
MCV RBC AUTO: 79 FL (ref 82–98)
MONOCYTES # BLD AUTO: 0.48 THOUSAND/ÂΜL (ref 0.17–1.22)
MONOCYTES NFR BLD AUTO: 8 % (ref 4–12)
NEUTROPHILS # BLD AUTO: 3.65 THOUSANDS/ÂΜL (ref 1.85–7.62)
NEUTS SEG NFR BLD AUTO: 57 % (ref 43–75)
NRBC BLD AUTO-RTO: 0 /100 WBCS
PLATELET # BLD AUTO: 322 THOUSANDS/UL (ref 149–390)
PMV BLD AUTO: 10.3 FL (ref 8.9–12.7)
POTASSIUM SERPL-SCNC: 3.1 MMOL/L (ref 3.5–5.3)
PROT SERPL-MCNC: 6.7 G/DL (ref 6.4–8.4)
RBC # BLD AUTO: 4.93 MILLION/UL (ref 3.81–5.12)
SODIUM SERPL-SCNC: 139 MMOL/L (ref 135–147)
WBC # BLD AUTO: 6.3 THOUSAND/UL (ref 4.31–10.16)

## 2023-09-04 PROCEDURE — 80053 COMPREHEN METABOLIC PANEL: CPT

## 2023-09-04 PROCEDURE — 70496 CT ANGIOGRAPHY HEAD: CPT

## 2023-09-04 PROCEDURE — 99284 EMERGENCY DEPT VISIT MOD MDM: CPT

## 2023-09-04 PROCEDURE — 36415 COLL VENOUS BLD VENIPUNCTURE: CPT

## 2023-09-04 PROCEDURE — G1004 CDSM NDSC: HCPCS

## 2023-09-04 PROCEDURE — 99285 EMERGENCY DEPT VISIT HI MDM: CPT | Performed by: EMERGENCY MEDICINE

## 2023-09-04 PROCEDURE — 85025 COMPLETE CBC W/AUTO DIFF WBC: CPT

## 2023-09-04 PROCEDURE — 70498 CT ANGIOGRAPHY NECK: CPT

## 2023-09-04 RX ORDER — ACETAMINOPHEN 325 MG/1
650 TABLET ORAL ONCE
Status: COMPLETED | OUTPATIENT
Start: 2023-09-04 | End: 2023-09-04

## 2023-09-04 RX ORDER — POTASSIUM CHLORIDE 20 MEQ/1
40 TABLET, EXTENDED RELEASE ORAL ONCE
Status: COMPLETED | OUTPATIENT
Start: 2023-09-04 | End: 2023-09-04

## 2023-09-04 RX ADMIN — IOHEXOL 85 ML: 350 INJECTION, SOLUTION INTRAVENOUS at 13:00

## 2023-09-04 RX ADMIN — POTASSIUM CHLORIDE 40 MEQ: 1500 TABLET, EXTENDED RELEASE ORAL at 14:03

## 2023-09-04 RX ADMIN — ACETAMINOPHEN 650 MG: 325 TABLET, FILM COATED ORAL at 15:17

## 2023-09-04 NOTE — ED PROVIDER NOTES
History  Chief Complaint   Patient presents with   • High Blood Pressure     Pt reports checking blood pressure this morning and noticing it was high. Denies CP, SOB, dizziness. Has less sensation in R side of face compared to left. Patient is a 57 yo female with PMH diabetes, HTN, migraine headaches, and left MCA aneurysm s/p clipping in 2018 with frequent neurosurgical follow-up. She presents to the ED for evaluation of vague, intermittent neurologic symptoms for the past several days. She complains of feeling "off" for the past week, included intermittent right sided weakness, which she believes she has not experienced since initial discovery of her aneurysm. She also reports that she began feeling right sided facial tingling and left sided headache yesterday. . Due to these symptoms, she began taking her BP frequently overnight and noted SBP as high as 179 at home. She says symptoms have mostly resolved since coming to the ED, except her headache. She denies any other focal neurologic symptoms including trouble speaking or swallowing, difficulty ambulating, dizziness. She also denies chest pain, SOB. Prior to Admission Medications   Prescriptions Last Dose Informant Patient Reported? Taking?    Cholecalciferol (Vitamin D3) 50 MCG (2000 UT) capsule  Self No No   Sig: Take 1 capsule (2,000 Units total) by mouth every morning   amitriptyline (ELAVIL) 50 mg tablet  Self No No   Sig: Take 1 tablet (50 mg total) by mouth daily at bedtime   chlorthalidone 25 mg tablet  Self No No   Sig: Take 0.5 tablets (12.5 mg total) by mouth in the morning   famotidine (PEPCID) 40 MG tablet  Self No No   Sig: Take 1 tablet (40 mg total) by mouth daily at bedtime   losartan (COZAAR) 100 MG tablet  Self No No   Sig: Take 1 tablet (100 mg total) by mouth in the morning   metFORMIN (GLUCOPHAGE) 500 mg tablet  Self No No   Sig: Take 1 tablet (500 mg total) by mouth 2 (two) times a day with meals   omeprazole (PriLOSEC) 40 MG capsule  Self No No   Sig: Take 1 capsule (40 mg total) by mouth daily before breakfast   potassium chloride (Klor-Con) 20 mEq packet   No No   Sig: Take 20 mEq by mouth daily   rimegepant sulfate (Nurtec) 75 mg TBDP  Self No No   Sig: Take one NURTEC 75 mg at onset under tongue. Limit 1 in 24 hours   Patient not taking: Reported on 6/15/2023   verapamil (VERELAN PM) 120 MG 24 hr capsule  Self No No   Sig: Take 1 capsule (120 mg total) by mouth in the morning      Facility-Administered Medications: None       Past Medical History:   Diagnosis Date   • Anemia    • Aneurysm (HCC)    • Hypertension    • Migraine        Past Surgical History:   Procedure Laterality Date   • BREAST CYST EXCISION Right 2021   • HYSTERECTOMY     • IR CEREBRAL ANGIOGRAPHY  1/6/2023       Family History   Problem Relation Age of Onset   • Migraines Sister    • Aneurysm Sister    • No Known Problems Maternal Grandmother    • No Known Problems Maternal Grandfather    • No Known Problems Paternal Grandmother    • No Known Problems Paternal Grandfather    • Breast cancer Maternal Aunt      I have reviewed and agree with the history as documented. E-Cigarette/Vaping   • E-Cigarette Use Never User      E-Cigarette/Vaping Substances   • Nicotine No    • THC No    • CBD No    • Flavoring No    • Other No    • Unknown No      Social History     Tobacco Use   • Smoking status: Never   • Smokeless tobacco: Never   Vaping Use   • Vaping Use: Never used   Substance Use Topics   • Alcohol use: Never   • Drug use: Never        Review of Systems   Constitutional: Negative for chills and fever. Respiratory: Negative for shortness of breath. Cardiovascular: Negative for chest pain. Gastrointestinal: Positive for nausea (intermittent). Negative for abdominal pain and vomiting. Endocrine: Polyuria: chronic. Musculoskeletal: Negative for neck pain and neck stiffness.    Neurological: Positive for weakness (right sided, resolved), light-headedness (intermittent over last week ), numbness (right sided facial "tingling") and headaches. Negative for dizziness, syncope, facial asymmetry and speech difficulty. Physical Exam  ED Triage Vitals   Temperature Pulse Respirations Blood Pressure SpO2   09/04/23 0833 09/04/23 0833 09/04/23 0833 09/04/23 0835 09/04/23 0833   98 °F (36.7 °C) 103 18 (!) 186/106 99 %      Temp Source Heart Rate Source Patient Position - Orthostatic VS BP Location FiO2 (%)   09/04/23 0833 09/04/23 0833 09/04/23 0833 09/04/23 0833 --   Temporal Monitor Lying Right arm       Pain Score       09/04/23 1517       3             Orthostatic Vital Signs  Vitals:    09/04/23 1300 09/04/23 1400 09/04/23 1430 09/04/23 1500   BP: 153/84 140/75 149/74 155/76   Pulse: 82 84 80 78   Patient Position - Orthostatic VS:           Physical Exam  Constitutional:       General: She is not in acute distress. Appearance: Normal appearance. She is not ill-appearing, toxic-appearing or diaphoretic. HENT:      Head: Normocephalic and atraumatic. Eyes:      General: No scleral icterus. Right eye: No discharge. Left eye: No discharge. Extraocular Movements: Extraocular movements intact. Conjunctiva/sclera: Conjunctivae normal.      Pupils: Pupils are equal, round, and reactive to light. Cardiovascular:      Rate and Rhythm: Normal rate and regular rhythm. Pulses: Normal pulses. Heart sounds: Normal heart sounds. Pulmonary:      Effort: Pulmonary effort is normal.      Breath sounds: Normal breath sounds. Musculoskeletal:      Cervical back: Normal range of motion and neck supple. No rigidity. Skin:     General: Skin is warm and dry. Neurological:      General: No focal deficit present. Mental Status: She is alert and oriented to person, place, and time. Cranial Nerves: No cranial nerve deficit. Sensory: No sensory deficit. Motor: No weakness.    Psychiatric:         Mood and Affect: Mood normal.         Behavior: Behavior normal.         Thought Content:  Thought content normal.         Judgment: Judgment normal.         ED Medications  Medications   iohexol (OMNIPAQUE) 350 MG/ML injection (MULTI-DOSE) 85 mL (85 mL Intravenous Given 9/4/23 1300)   potassium chloride (K-DUR,KLOR-CON) CR tablet 40 mEq (40 mEq Oral Given 9/4/23 1403)   acetaminophen (TYLENOL) tablet 650 mg (650 mg Oral Given 9/4/23 1517)       Diagnostic Studies  Results Reviewed     Procedure Component Value Units Date/Time    Comprehensive metabolic panel [[id number]]  (Abnormal) Collected: 09/04/23 1045    Lab Status: Final result Specimen: Blood from Arm, Right Updated: 09/04/23 1127     Sodium 139 mmol/L      Potassium 3.1 mmol/L      Chloride 103 mmol/L      CO2 28 mmol/L      ANION GAP 8 mmol/L      BUN 10 mg/dL      Creatinine 0.69 mg/dL      Glucose 97 mg/dL      Calcium 8.8 mg/dL      AST 16 U/L      ALT 18 U/L      Alkaline Phosphatase 70 U/L      Total Protein 6.7 g/dL      Albumin 3.6 g/dL      Total Bilirubin 0.33 mg/dL      eGFR 104 ml/min/1.73sq m     Narrative:      [place] guidelines for Chronic Kidney Disease (CKD):   •  Stage 1 with normal or high GFR (GFR > 90 mL/min/1.73 square meters)  •  Stage 2 Mild CKD (GFR = 60-89 mL/min/1.73 square meters)  •  Stage 3A Moderate CKD (GFR = 45-59 mL/min/1.73 square meters)  •  Stage 3B Moderate CKD (GFR = 30-44 mL/min/1.73 square meters)  •  Stage 4 Severe CKD (GFR = 15-29 mL/min/1.73 square meters)  •  Stage 5 End Stage CKD (GFR <15 mL/min/1.73 square meters)  Note: GFR calculation is accurate only with a steady state creatinine    CBC and differential [[id number]]  (Abnormal) Collected: 09/04/23 1045    Lab Status: Final result Specimen: Blood from Arm, Right Updated: 09/04/23 1052     WBC 6.30 Thousand/uL      RBC 4.93 Million/uL      Hemoglobin 11.7 g/dL      Hematocrit 39.0 %      MCV 79 fL      MCH 23.7 pg      MCHC 30.0 g/dL      RDW 16.4 %      MPV 10.3 fL      Platelets 322 Thousands/uL      nRBC 0 /100 WBCs      Neutrophils Relative 57 %      Immat GRANS % 0 %      Lymphocytes Relative 31 %      Monocytes Relative 8 %      Eosinophils Relative 3 %      Basophils Relative 1 %      Neutrophils Absolute 3.65 Thousands/µL      Immature Grans Absolute 0.02 Thousand/uL      Lymphocytes Absolute 1.95 Thousands/µL      Monocytes Absolute 0.48 Thousand/µL      Eosinophils Absolute 0.17 Thousand/µL      Basophils Absolute 0.03 Thousands/µL                  CTA head and neck with and without contrast   Final Result by [facility], DO (09/04 1319)      No acute intracranial abnormality. No evidence of subarachnoid hemorrhage. Status post left frontotemporal craniotomy and left MCA region aneurysm clip placement. Small outpouchings are noted in the area of the aneurysm clip stable from prior study suspicious for residual/recurrent aneurysm as described on conventional    angiogram report from January 2023. Workstation performed: [id number]               Procedures  Procedures      ED Course                                       Medical Decision Making  Lucrecia Velasquez is a 56 y.o. who presents with complaints of intermittent neurologic symptoms over the past week. Vital signs are hypertensive, -160, afebrile  Pe: no acute focal neurologic deficits    Ddx: aneursym vs. Other acute intracranial pathology       Plan:   CT/CTA head and neck:    No acute intracranial abnormality. No evidence of subarachnoid hemorrhage. Status post left frontotemporal craniotomy and left MCA region aneurysm clip placement. Small outpouchings are noted in the area of the aneurysm clip stable from prior study suspicious for residual/recurrent aneurysm as described on conventional angiogram report from January 2023. All blood work WNL  Tylenol for headache  Discussed with neurosurgery- see separate telemedicine note.  No need for formal evaluation at this time. CT stable. Reassessment: no episodes of neurologic symptoms in ED. Patient reports feeling better than she did prior to ED. Disposition: Patient stable for discharge home. Recommend follow up with PCP in one week if symptoms persist. Return precautions provided. Patient understands and is agreeable to plan. Amount and/or Complexity of Data Reviewed  Labs: ordered. Radiology: ordered. Risk  OTC drugs. Prescription drug management. Disposition  Final diagnoses:   Headache   Hypertension   Weakness     Time reflects when diagnosis was documented in both MDM as applicable and the Disposition within this note     Time User Action Codes Description Comment    9/4/2023  3:12 PM Guillermina Moisés [R51.9] Headache     9/4/2023  3:12 PM Luis Mejia Hypertension     9/4/2023  3:12 PM David Montez [R53.1] Weakness       ED Disposition     ED Disposition   Discharge    Condition   Stable    Date/Time   Mon Sep 4, 2023  3:13 PM    Comment   Sushila Cantu discharge to home/self care.                Follow-up Information     Follow up With Specialties Details Why Contact Info    Eryn Nunez PA-C Family Medicine, Physician Assistant Go in 1 week  [address]  [address] [facility], [place] [address]  [phone]            Discharge Medication List as of 9/4/2023  3:16 PM      CONTINUE these medications which have NOT CHANGED    Details   amitriptyline (ELAVIL) 50 mg tablet Take 1 tablet (50 mg total) by mouth daily at bedtime, Starting Tue 6/6/2023, Normal      chlorthalidone 25 mg tablet Take 0.5 tablets (12.5 mg total) by mouth in the morning, Starting Fri 1/13/2023, Normal      Cholecalciferol (Vitamin D3) 50 MCG (2000 UT) capsule Take 1 capsule (2,000 Units total) by mouth every morning, Starting Fri 10/14/2022, Normal      famotidine (PEPCID) 40 MG tablet Take 1 tablet (40 mg total) by mouth daily at bedtime, Starting Thu 6/15/2023, Normal      losartan (COZAAR) 100 MG tablet Take 1 tablet (100 mg total) by mouth in the morning, Starting Fri 4/21/2023, Normal      metFORMIN (GLUCOPHAGE) 500 mg tablet Take 1 tablet (500 mg total) by mouth 2 (two) times a day with meals, Starting Fri 4/21/2023, Normal      omeprazole (PriLOSEC) 40 MG capsule Take 1 capsule (40 mg total) by mouth daily before breakfast, Starting Thu 6/15/2023, Normal      potassium chloride (Klor-Con) 20 mEq packet Take 20 mEq by mouth daily, Starting Wed 7/26/2023, Normal      rimegepant sulfate (Nurtec) 75 mg TBDP Take one NURTEC 75 mg at onset under tongue. Limit 1 in 24 hours, Normal      verapamil (VERELAN PM) 120 MG 24 hr capsule Take 1 capsule (120 mg total) by mouth in the morning, Starting Fri 1/13/2023, Normal           No discharge procedures on file. PDMP Review     None           ED Provider  Attending physically available and evaluated Osman Teague. I managed the patient along with the ED Attending.     Electronically Signed by         Skyler Mcrae MD  09/04/23 0924

## 2023-09-04 NOTE — TELEMEDICINE
e-Consult (IPC)     Consults     Contacted by Dr. Navid Velazquez at [facility] 56 y.o. female MRN: [id number]  Unit/Bed#: ED 23 Encounter: [id number]    Reason for Consult    Per provider report, patient presents with vague intermittent neuro symptoms including right sided weakness and tingling in the right side of her face x 1 week. She is known to [address] [facility] office -previous history of clipped left MCA aneurysm in 2018. She underwent a formal angiogram in January 2023 which demonstrated small recurrence at the origin of the clipping as well as another small M2 aneurysm both of which were 2 to 3 mm and very small. At that time the recommendation was no further treatment but ongoing follow-up in 1 years time with repeat CTA. Dwayne Christianson Available past medical history,social history, surgical history, medication list, drug allergies and review of systems were reviewed. /84   Pulse 82   Temp 98 °F (36.7 °C) (Temporal)   Resp 18   LMP  (LMP Unknown)   SpO2 96%      Clinical exam per provider report, nonfocal    Imaging personally reviewed. · CTA head and neck with and without contrast 9/4/2023: No acute intracranial abnormality. No evidence of subarachnoid hemorrhage. Status post left frontal temporal craniotomy and left MCA region aneurysm clip placement. Small outpouchings are noted in the area of the aneurysm clip stable from prior study suspicious for residual/recurrent aneurysm as described on conventional angiogram report from January 2023. Assessment and Recommendations    1. Imaging reviewed, stable findings noted without acute intracranial abnormality  2. Unclear etiology of patient's presenting symptoms however it is unrelated to her known aneurysm history  3. Consider neurology consultation and possible stroke work-up if warranted  4. Rest of care per primary team  5. Follow-up as scheduled in January 2024 with repeat CTA for ongoing management of aneurysms  6.  Neurosurgery to sign off, please call with questions or concerns    All questions answered. Provider is in agreement with the course of action. >5 min, >50% time spent reviewing/analyzing record, written report will be generated in the EMR.

## 2023-09-04 NOTE — DISCHARGE INSTRUCTIONS
You can take tylenol as needed for headache. Please return to the ED if you have worsening headache, visual changes, weakness, trouble speaking, loss of consciousness, dizziness, or other neurologic symptoms. Otherwise, please follow up with your PCP.

## 2023-09-04 NOTE — Clinical Note
Jazmín Alanizaddy was seen and treated in our emergency department on 9/4/2023. Diagnosis:     Makayla Hassan  may return to work on return date. She may return on this date: 09/05/2023         If you have any questions or concerns, please don't hesitate to call.       Lianne Garg MD    ______________________________           _______________          _______________  Hospital Representative                              Date                                Time

## 2023-09-04 NOTE — ED ATTENDING ATTESTATION
9/4/2023  I, Stef Enciso MD, saw and evaluated the patient. I have discussed the patient with the resident/non-physician practitioner and agree with the resident's/non-physician practitioner's findings, Plan of Care, and MDM as documented in the resident's/non-physician practitioner's note, except where noted. All available labs and Radiology studies were reviewed. I was present for key portions of any procedure(s) performed by the resident/non-physician practitioner and I was immediately available to provide assistance. At this point I agree with the current assessment done in the Emergency Department.   I have conducted an independent evaluation of this patient a history and physical is as follows:  Pt stated feeling unwell several days aog noted elevated BP This am felt numbness in r head pain in L and some weakness over the past week no problems speaking the weakness is intermittent and on R PE: alert heart reg lungs clear abd soft nontender neuro nonfocal MDM: will do ct cta head and neck check labs  ED Course         Critical Care Time  Procedures

## 2023-10-20 ENCOUNTER — TELEPHONE (OUTPATIENT)
Dept: NEUROLOGY | Facility: CLINIC | Age: 48
End: 2023-10-20

## 2023-10-20 NOTE — TELEPHONE ENCOUNTER
Re-scheduled for 02/14/2024, Dr.Patel Joya, 3:30 pm, due to no insurance and needing only a wed and late afternoon.     Thank you,     Maia Appl

## 2023-10-25 ENCOUNTER — ANNUAL EXAM (OUTPATIENT)
Dept: OBGYN CLINIC | Facility: CLINIC | Age: 48
End: 2023-10-25

## 2023-10-25 VITALS
HEIGHT: 65 IN | DIASTOLIC BLOOD PRESSURE: 80 MMHG | SYSTOLIC BLOOD PRESSURE: 122 MMHG | WEIGHT: 281.6 LBS | OXYGEN SATURATION: 99 % | BODY MASS INDEX: 46.92 KG/M2

## 2023-10-25 DIAGNOSIS — Z01.419 ENCOUNTER FOR GYNECOLOGICAL EXAMINATION (GENERAL) (ROUTINE) WITHOUT ABNORMAL FINDINGS: Primary | ICD-10-CM

## 2023-10-25 DIAGNOSIS — Z12.31 SCREENING MAMMOGRAM FOR BREAST CANCER: ICD-10-CM

## 2023-10-25 DIAGNOSIS — N90.7 VULVAR CYST: ICD-10-CM

## 2023-10-25 PROCEDURE — 99396 PREV VISIT EST AGE 40-64: CPT | Performed by: NURSE PRACTITIONER

## 2023-10-25 NOTE — PROGRESS NOTES
Assessment / Plan    1. Encounter for gynecological examination (general) (routine) without abnormal findings  Well woman exam  Cervical cancer screening is no longer indicated. Up to date on colonoscopy    2. Screening mammogram for breast cancer  Has order and appt scheduled. 3. Vulvar cyst  Sebaceous. Reassured patient. Recommend hot compresses with topical antibiotic ointment 3x daily for one week. RV prn if increases in size or becomes painful. Estella Wilkins is a 53 y.o. female who presents for her annual gynecologic exam.    41 yo G0  Reports having some "boils" in the genital area. Hx of hysterectomy, , AUB  Last pap: pre-2020  Pap hx: NL  STD hx: none  2022 mammo negative; has update scheduled 23.  2022 Colonoscopy, polypectomies x2; rpt 5 yrs    Sexually active: not currently, last time was years ago  [facility] method: hysterectomy  Calcium intake: 1-2 servings per day; guidelines given. Exercise: no; guidelines given  Safety: feels safe    Periods are absent  Current contraception: status post hysterectomy  History of abnormal Pap smear: no  Family history of breast,uterine, ovarian or colon cancer: yes - mat aunt breast ca      Menstrual History:  OB History          0    Para   0    Term   0            AB        Living             SAB        IAB        Ectopic        Multiple        Live Births                      No LMP recorded (lmp unknown). Patient has had a hysterectomy. The following portions of the patient's history were reviewed and updated as appropriate: allergies, current medications, past family history, past medical history, past social history, past surgical history, and problem list.    Review of Systems      Review of Systems   Constitutional:  Negative for chills and fever. Respiratory:  Negative for cough and shortness of breath.     Gastrointestinal:  Negative for abdominal distention, abdominal pain, blood in stool, constipation, diarrhea, nausea and vomiting. Genitourinary:  Positive for genital sores ("boil"). Negative for difficulty urinating, dysuria, frequency, hematuria, menstrual problem, pelvic pain, urgency, vaginal bleeding and vaginal discharge. Musculoskeletal:  Negative for arthralgias and myalgias. Breasts:  Negative for skin changes, dimpling, asymmetry, nipple discharge, redness, tenderness or palpable masses    Objective      /80 (BP Location: Right arm, Patient Position: Sitting, Cuff Size: Standard)   Ht 5' 5" (1.651 m)   Wt 128 kg (281 lb 9.6 oz)   LMP  (LMP Unknown)   SpO2 99%   BMI 46.86 kg/m²   Physical Exam  Constitutional:       General: She is not in acute distress. Appearance: Normal appearance. She is well-developed. She is not ill-appearing or diaphoretic. Comments: Bmi 46.9   HENT:      Head: Normocephalic and atraumatic. Eyes:      Pupils: Pupils are equal, round, and reactive to light. Neck:      Thyroid: No thyromegaly. Pulmonary:      Effort: Pulmonary effort is normal.   Chest:   Breasts:     Breasts are symmetrical.      Right: No inverted nipple, mass, nipple discharge, skin change or tenderness. Left: No inverted nipple, mass, nipple discharge, skin change or tenderness. Abdominal:      General: There is no distension. Palpations: Abdomen is soft. There is no mass. Tenderness: There is no abdominal tenderness. There is no guarding or rebound. Genitourinary:     General: Normal vulva. Exam position: Lithotomy position. Labia:         Right: No rash, tenderness, lesion or injury. Left: No rash, tenderness, lesion or injury. Vagina: No signs of injury and foreign body. No vaginal discharge, erythema, tenderness or bleeding. Adnexa:         Right: No mass or tenderness. Left: No mass or tenderness. Comments: Cervix and uterus surgically absent.   Exam limited by habitus  Musculoskeletal: Cervical back: Neck supple. Lymphadenopathy:      Cervical: No cervical adenopathy. Upper Body:      Right upper body: No supraclavicular adenopathy. Left upper body: No supraclavicular adenopathy. Skin:     General: Skin is warm and dry. Neurological:      General: No focal deficit present. Mental Status: She is alert and oriented to person, place, and time. Psychiatric:         Mood and Affect: Mood normal.         Behavior: Behavior normal.         Thought Content:  Thought content normal.         Judgment: Judgment normal.

## 2023-10-25 NOTE — PATIENT INSTRUCTIONS
For sebaceous cyst:  Hot compresses for 10 minutes three times a day, with topical triple antibiotic ointment three times a day for one week. Weight Management   WHAT YOU NEED TO KNOW:   Being overweight increases your risk of health conditions such as heart disease, high blood pressure, type 2 diabetes, and certain types of cancer. It can also increase your risk for osteoarthritis, sleep apnea, and other respiratory problems. Aim for a slow, steady weight loss. Even a small amount of weight loss can lower your risk of health problems. DISCHARGE INSTRUCTIONS:   How to lose weight safely:  A safe and healthy way to lose weight is to eat fewer calories and get regular exercise. You can lose up about 1 pound a week by decreasing the number of calories you eat by 500 calories each day. You can decrease calories by eating smaller portion sizes or by cutting out high-calorie foods. Read labels to find out how many calories are in the foods you eat. You can also burn calories with exercise such as walking, swimming, or biking. You will be more likely to keep weight off if you make these changes part of your lifestyle. Exercise at least 30 minutes per day on most days of the week. You can also fit in more physical activity by taking the stairs instead of the elevator or parking farther away from stores. Ask your healthcare provider about the best exercise plan for you. Healthy meal plan for weight management:  A healthy meal plan includes a variety of foods, contains fewer calories, and helps you stay healthy. A healthy meal plan includes the following:     Eat whole-grain foods more often. A healthy meal plan should contain fiber. Fiber is the part of grains, fruits, and vegetables that is not broken down by your body. Whole-grain foods are healthy and provide extra fiber in your diet. Some examples of whole-grain foods are whole-wheat breads and pastas, oatmeal, brown rice, and bulgur.     Eat a variety of vegetables every day. Include dark, leafy greens such as spinach, kale, gracie greens, and mustard greens. Eat yellow and orange vegetables such as carrots, sweet potatoes, and winter squash. Eat a variety of fruits every day. Choose fresh or canned fruit (canned in its own juice or light syrup) instead of juice. Fruit juice has very little or no fiber. Eat low-fat dairy foods. Drink fat-free (skim) milk or 1% milk. Eat fat-free yogurt and low-fat cottage cheese. Try low-fat cheeses such as mozzarella and other reduced-fat cheeses. Choose meat and other protein foods that are low in fat. Choose beans or other legumes such as split peas or lentils. Choose fish, skinless poultry (chicken or turkey), or lean cuts of red meat (beef or pork). Before you cook meat or poultry, cut off any visible fat. Use less fat and oil. Try baking foods instead of frying them. Add less fat, such as margarine, sour cream, regular salad dressing, and mayonnaise to foods. Eat fewer high-fat foods. Some examples of high-fat foods include french fries, doughnuts, ice cream, and cakes. Eat fewer sweets. Limit foods and drinks that are high in sugar. This includes candy, cookies, regular soda, and sweetened drinks. Ways to decrease calories:   Eat smaller portions. Use a small plate with smaller servings. Do not eat second helpings. When you eat at a restaurant, ask for a box and place half of your meal in the box before you eat. Share an entrée with someone else. Replace high-calorie snacks with healthy, low-calorie snacks. Choose fresh fruit, vegetables, fat-free rice cakes, or air-popped popcorn instead of potato chips, nuts, or chocolate. Choose water or calorie-free drinks instead of soda or sweetened drinks. Do not shop for groceries when you are hungry. You may be more likely to make unhealthy food choices. Take a grocery list of healthy foods and shop after you have eaten.     Eat regular meals. Do not skip meals. Skipping meals can lead to overeating later in the day. This can make it harder for you to lose weight. Eat a healthy snack in place of a meal if you do not have time to eat a regular meal. Talk with a dietitian to help you create a meal plan and schedule that is right for you. Other things to consider as you try to lose weight:   Be aware of situations that may give you the urge to overeat, such as eating while watching television. Find ways to avoid these situations. For example, read a book, go for a walk, or do crafts. Meet with a weight loss support group or friends who are also trying to lose weight. This may help you stay motivated to continue working on your weight loss goals. © Copyright Thana Givens 2023 Information is for End User's use only and may not be sold, redistributed or otherwise used for commercial purposes. The above information is an  only. It is not intended as medical advice for individual conditions or treatments. Talk to your doctor, nurse or pharmacist before following any medical regimen to see if it is safe and effective for you. Calcium and Osteoporosis   AMBULATORY CARE:   Why calcium is important for osteoporosis:  Calcium is important for osteoporosis because calcium helps build bone mass. Osteoporosis is a long-term medical condition that causes your body to break down more bone than it makes. Your bones become weak, brittle, and more likely to fracture. Your calcium needs:   Women:      19 to 50 years: 1,000 mg    Over 50: 1,200 mg    Pregnant or breastfeeding, 19 years to 50 years: 1,000 mg    Men:      19 to 70: 1,000 mg    Over 70: 1,200 mg    Foods that are high in calcium: The following list shows the number of calcium milligrams (mg) per serving. Your dietitian or healthcare provider can help you create a balanced meal plan for your calcium needs.   Dairy:      1 cup of low-fat plain yogurt (415 mg) or low-fat fruit yogurt (245 to 384 mg)    1½ ounces of shredded cheddar cheese (306 mg) or part skim mozzarella cheese (275 mg)    1 cup of skim, 2%, or whole milk (300 mg)    1 cup of cottage cheese made with 2% milk fat (138 mg)    ½ cup of frozen yogurt (103 mg)    Other foods:      1 cup of calcium-fortified orange juice (300 mg)    ½ cup of cooked gracie greens (220 mg)    4 canned sardines, with bones (242 mg)    ½ cup of tofu (with added calcium) (204 mg)       How to get extra calcium:   Add powdered milk to puddings, cocoa, custard, or hot cereal.    Sift powdered milk into flour when you make cakes, cookies, or breads. Use low-fat or fat-free milk instead of water in pancake mix, mashed potatoes, pudding, or hot breakfast cereal.    Add low-fat or fat-free cheese to salad, soup, or pasta. Add tofu (with added calcium) to vegetable stir-martinez. Take calcium supplements if you cannot get enough calcium from the foods you eat. Your body can absorb the most calcium from supplements when you take 500 mg or less at one time. Do not take more than 2,500 mg of calcium supplements each day. Follow up with your doctor or dietitian as directed:  Write down your questions so you remember to ask them during your visits. © Copyright Brenda Albelsy 2023 Information is for End User's use only and may not be sold, redistributed or otherwise used for commercial purposes. The above information is an  only. It is not intended as medical advice for individual conditions or treatments. Talk to your doctor, nurse or pharmacist before following any medical regimen to see if it is safe and effective for you. EXERCISE RECOMMENDATIONS:  Recommend regular exercise, 30 minutes of cardiovascular, 4-5 times a week (brisk walking, jogging, biking, elliptical).

## 2023-11-01 ENCOUNTER — OFFICE VISIT (OUTPATIENT)
Dept: OBGYN CLINIC | Facility: MEDICAL CENTER | Age: 48
End: 2023-11-01

## 2023-11-01 VITALS
WEIGHT: 284.2 LBS | HEIGHT: 65 IN | DIASTOLIC BLOOD PRESSURE: 80 MMHG | HEART RATE: 85 BPM | SYSTOLIC BLOOD PRESSURE: 123 MMHG | BODY MASS INDEX: 47.35 KG/M2

## 2023-11-01 DIAGNOSIS — M17.0 BILATERAL PRIMARY OSTEOARTHRITIS OF KNEE: Primary | ICD-10-CM

## 2023-11-01 DIAGNOSIS — E66.01 OBESITY, MORBID (MORE THAN 100 LBS OVER IDEAL WEIGHT OR BMI > 40) (HCC): ICD-10-CM

## 2023-11-01 PROCEDURE — 20610 DRAIN/INJ JOINT/BURSA W/O US: CPT | Performed by: PHYSICIAN ASSISTANT

## 2023-11-01 PROCEDURE — 99213 OFFICE O/P EST LOW 20 MIN: CPT | Performed by: PHYSICIAN ASSISTANT

## 2023-11-01 RX ORDER — TRIAMCINOLONE ACETONIDE 40 MG/ML
40 INJECTION, SUSPENSION INTRA-ARTICULAR; INTRAMUSCULAR
Status: COMPLETED | OUTPATIENT
Start: 2023-11-01 | End: 2023-11-01

## 2023-11-01 RX ORDER — LIDOCAINE HYDROCHLORIDE 10 MG/ML
1 INJECTION, SOLUTION INFILTRATION; PERINEURAL
Status: COMPLETED | OUTPATIENT
Start: 2023-11-01 | End: 2023-11-01

## 2023-11-01 RX ADMIN — LIDOCAINE HYDROCHLORIDE 1 ML: 10 INJECTION, SOLUTION INFILTRATION; PERINEURAL at 08:15

## 2023-11-01 RX ADMIN — TRIAMCINOLONE ACETONIDE 40 MG: 40 INJECTION, SUSPENSION INTRA-ARTICULAR; INTRAMUSCULAR at 08:15

## 2023-11-01 NOTE — PROGRESS NOTES
Assessment/Plan:  1. Bilateral primary osteoarthritis of knee    2. Obesity, morbid (more than 100 lbs over ideal weight or BMI > 40) (formerly [facility])      Orders Placed This Encounter   Procedures    Large joint arthrocentesis: bilateral knee    Ambulatory Referral to Weight Management       Patient has severe bilateral knee osteoarthritis. Patient received bilateral knee steroid injections today. Tolerated the procedures well. Postinjection instructions reviewed. Patient is not a surgical candidate at this time due to BMI >40. Weight management referral provided today. Tylenol as needed up to 3000 mg/day. Continue activity as tolerated. Patient is happy with the results that she gets from steroid injections for the time being. Return in about 3 months (around 2/1/2024) for bilat knee CSI. I answered all of the patient's questions during the visit and provided education of the patient's condition during the visit. The patient verbalized understanding of the information given and agrees with the plan. This note was dictated using Alaris software. It may contain errors including improperly dictated words. Please contact physician directly for any questions. Subjective   Chief Complaint:   Chief Complaint   Patient presents with    Left Knee - Follow-up    Right Knee - Follow-up     HPI  Venkatesh Gaspar is a 53 y.o. female who presents for follow up for bilateral knee pain. Patient received bilateral knee steroid injections at her last visit on 7/21/23 and reports good pain relief from this injection. Patient reports the return of bilateral knee pain. 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 Patient not currently doing any physical therapy or home exercises. Patient states she does have knee braces at home but does not wear them. Patient states tylenol arthritis provides minimal relief. Patient would like repeat bilateral knee steroid injections today. Review of Systems  ROS:    See HPI for musculoskeletal review.    All other systems reviewed are negative     History:  Past Medical History:   Diagnosis Date    Anemia     Aneurysm ([address])     Hypertension     Migraine     Morbid obesity with BMI of 45.0-49.9, adult [facility])      Past Surgical History:   Procedure Laterality Date    BREAST CYST EXCISION Right 2021    HYSTERECTOMY  2020    AUB; still has ovaries    IR CEREBRAL ANGIOGRAPHY  01/06/2023     Social History   Social History     Substance and Sexual Activity   Alcohol Use Never     Social History     Substance and Sexual Activity   Drug Use Never     Social History     Tobacco Use   Smoking Status Never   Smokeless Tobacco Never     Family History:   Family History   Problem Relation Age of Onset    Migraines Sister     Aneurysm Sister     No Known Problems Maternal Grandmother     No Known Problems Maternal Grandfather     No Known Problems Paternal Grandmother     No Known Problems Paternal Grandfather     Breast cancer Maternal Aunt     Colon cancer Neg Hx     Ovarian cancer Neg Hx     Uterine cancer Neg Hx        Current Outpatient Medications on File Prior to Visit   Medication Sig Dispense Refill    amitriptyline (ELAVIL) 50 mg tablet Take 1 tablet (50 mg total) by mouth daily at bedtime 90 tablet 3    chlorthalidone 25 mg tablet Take 0.5 tablets (12.5 mg total) by mouth in the morning 180 tablet 1    Cholecalciferol (Vitamin D3) 50 MCG (2000 UT) capsule Take 1 capsule (2,000 Units total) by mouth every morning 90 capsule 3    famotidine (PEPCID) 40 MG tablet Take 1 tablet (40 mg total) by mouth daily at bedtime 90 tablet 3    losartan (COZAAR) 100 MG tablet Take 1 tablet (100 mg total) by mouth in the morning 90 tablet 3    metFORMIN (GLUCOPHAGE) 500 mg tablet Take 1 tablet (500 mg total) by mouth 2 (two) times a day with meals 180 tablet 3    omeprazole (PriLOSEC) 40 MG capsule Take 1 capsule (40 mg total) by mouth daily before breakfast 90 capsule 3    potassium chloride (Klor-Con) 20 mEq packet Take 20 mEq by mouth daily 30 each 6    rimegepant sulfate (Nurtec) 75 mg TBDP Take one NURTEC 75 mg at onset under tongue. Limit 1 in 24 hours (Patient not taking: Reported on 6/15/2023) 16 tablet 3    verapamil (VERELAN PM) 120 MG 24 hr capsule Take 1 capsule (120 mg total) by mouth in the morning 90 capsule 3     No current facility-administered medications on file prior to visit. No Known Allergies     Objective     /80   Pulse 85   Ht 5' 5" (1.651 m)   Wt 129 kg (284 lb 3.2 oz)   LMP  (LMP Unknown)   BMI 47.29 kg/m²      PE:  AAOx 3  WDWN  Hearing intact, no drainage from eyes  no audible wheezing  no abdominal distension  LE compartments soft, skin intact    Ortho Exam:  bilateral Knee:   No erythema  no swelling  no effusion  no warmth  AROM: 0- 95 bilaterally  Stable to varus/valgus stress    Large joint arthrocentesis: bilateral knee  Universal Protocol:  Consent: Verbal consent obtained.   Risks and benefits: risks, benefits and alternatives were discussed  Consent given by: patient  Patient understanding: patient states understanding of the procedure being performed  Site marked: the operative site was marked  Patient identity confirmed: verbally with patient  Supporting Documentation  Indications: pain   Procedure Details  Location: knee - bilateral knee  Needle size: 22 G  Ultrasound guidance: no  Approach: anterolateral    Medications (Right): 40 mg triamcinolone acetonide 40 mg/mL; 1 mL lidocaine 1 %Medications (Left): 40 mg triamcinolone acetonide 40 mg/mL; 1 mL lidocaine 1 %   Patient tolerance: patient tolerated the procedure well with no immediate complications  Dressing:  Sterile dressing applied

## 2023-11-17 ENCOUNTER — HOSPITAL ENCOUNTER (OUTPATIENT)
Dept: MAMMOGRAPHY | Facility: CLINIC | Age: 48
Discharge: HOME | End: 2023-11-17

## 2023-11-17 VITALS — HEIGHT: 65 IN | BODY MASS INDEX: 47.32 KG/M2 | WEIGHT: 284 LBS

## 2023-11-17 DIAGNOSIS — Z12.31 ENCOUNTER FOR SCREENING MAMMOGRAM FOR MALIGNANT NEOPLASM OF BREAST: ICD-10-CM

## 2023-11-17 PROCEDURE — 77067 SCR MAMMO BI INCL CAD: CPT

## 2023-11-17 PROCEDURE — 77063 BREAST TOMOSYNTHESIS BI: CPT

## 2023-11-29 ENCOUNTER — OFFICE VISIT (OUTPATIENT)
Dept: FAMILY MEDICINE CLINIC | Facility: CLINIC | Age: 48
End: 2023-11-29

## 2023-11-29 VITALS
DIASTOLIC BLOOD PRESSURE: 94 MMHG | HEART RATE: 88 BPM | SYSTOLIC BLOOD PRESSURE: 140 MMHG | WEIGHT: 283 LBS | TEMPERATURE: 97.6 F | OXYGEN SATURATION: 96 % | BODY MASS INDEX: 47.09 KG/M2

## 2023-11-29 DIAGNOSIS — E66.01 MORBID OBESITY WITH BMI OF 40.0-44.9, ADULT (HCC): ICD-10-CM

## 2023-11-29 DIAGNOSIS — I10 BENIGN ESSENTIAL HYPERTENSION: ICD-10-CM

## 2023-11-29 DIAGNOSIS — G43.809 OTHER MIGRAINE WITHOUT STATUS MIGRAINOSUS, NOT INTRACTABLE: ICD-10-CM

## 2023-11-29 DIAGNOSIS — I67.1 CEREBRAL ANEURYSM, NONRUPTURED: ICD-10-CM

## 2023-11-29 DIAGNOSIS — Z59.9 FINANCIAL DIFFICULTIES: ICD-10-CM

## 2023-11-29 DIAGNOSIS — E11.9 TYPE 2 DIABETES MELLITUS WITHOUT COMPLICATION, WITHOUT LONG-TERM CURRENT USE OF INSULIN (HCC): Primary | ICD-10-CM

## 2023-11-29 PROBLEM — G43.909 MIGRAINE HEADACHE: Status: ACTIVE | Noted: 2023-11-29

## 2023-11-29 PROCEDURE — 99213 OFFICE O/P EST LOW 20 MIN: CPT | Performed by: PHYSICIAN ASSISTANT

## 2023-11-29 SDOH — ECONOMIC STABILITY - INCOME SECURITY: PROBLEM RELATED TO HOUSING AND ECONOMIC CIRCUMSTANCES, UNSPECIFIED: Z59.9

## 2023-11-29 NOTE — PATIENT INSTRUCTIONS
Assessment/plan:  1. Type 2 diabetes-stable with last hemoglobin A1c of 6.7. Patient currently on metformin therapy. 2.  Benign essential hypertension-stable on verapamil, losartan 100 mg daily and chlorthalidone 25 mg daily. Patient is having difficulty with finances currently and paying for her medications. Would encourage her to at least continue the blood pressure medications given her history of cerebral aneurysm and will refer our  to her to see if there is any medical assistance program she may qualify for. 3.  Cerebral aneurysm-stable per neurosurgery. 4.  Migraine headaches-follows with neurology. Recommend follow-up in 6 months with labs.

## 2023-11-29 NOTE — PROGRESS NOTES
Name: Anthony Pollock      : 1975      MRN: [id number]  Encounter Provider: Aidan Maldonado PA-C  Encounter Date: 2023   Encounter department: [facility] PRIMARY [facility]    Assessment & Plan     Patient Instructions   Assessment/plan:  1. Type 2 diabetes-stable with last hemoglobin A1c of 6.7. Patient currently on metformin therapy. 2.  Benign essential hypertension-stable on verapamil, losartan 100 mg daily and chlorthalidone 25 mg daily. Patient is having difficulty with finances currently and paying for her medications. Would encourage her to at least continue the blood pressure medications given her history of cerebral aneurysm and will refer our  to her to see if there is any medical assistance program she may qualify for. 3.  Cerebral aneurysm-stable per neurosurgery. 4.  Migraine headaches-follows with neurology. Recommend follow-up in 6 months with labs. 1. Type 2 diabetes mellitus without complication, without long-term current use of insulin (HCC)  -     Albumin / creatinine urine ratio; Future; Expected date: 2023  -     Comprehensive metabolic panel; Future; Expected date: 2023  -     Hemoglobin A1C; Future; Expected date: 2023  -     CBC and differential; Future; Expected date: 2023  -     Lipid Panel with Direct LDL reflex; Future; Expected date: 2023  -     TSH, 3rd generation with Free T4 reflex; Future; Expected date: 2023    2. Benign essential hypertension  -     Albumin / creatinine urine ratio; Future; Expected date: 2023  -     Comprehensive metabolic panel; Future; Expected date: 2023  -     Hemoglobin A1C; Future; Expected date: 2023  -     CBC and differential; Future; Expected date: 2023  -     Lipid Panel with Direct LDL reflex; Future; Expected date: 2023  -     TSH, 3rd generation with Free T4 reflex; Future; Expected date: 2023    3.  Cerebral aneurysm, nonruptured  - Albumin / creatinine urine ratio; Future; Expected date: 11/29/2023  -     Comprehensive metabolic panel; Future; Expected date: 11/29/2023  -     Hemoglobin A1C; Future; Expected date: 11/29/2023  -     CBC and differential; Future; Expected date: 11/29/2023  -     Lipid Panel with Direct LDL reflex; Future; Expected date: 11/29/2023  -     TSH, 3rd generation with Free T4 reflex; Future; Expected date: 11/29/2023    4. Morbid obesity with BMI of 40.0-44.9, adult (HCC)  -     Albumin / creatinine urine ratio; Future; Expected date: 11/29/2023  -     Comprehensive metabolic panel; Future; Expected date: 11/29/2023  -     Hemoglobin A1C; Future; Expected date: 11/29/2023  -     CBC and differential; Future; Expected date: 11/29/2023  -     Lipid Panel with Direct LDL reflex; Future; Expected date: 11/29/2023  -     TSH, 3rd generation with Free T4 reflex; Future; Expected date: 11/29/2023    5. Other migraine without status migrainosus, not intractable  -     Albumin / creatinine urine ratio; Future; Expected date: 11/29/2023  -     Comprehensive metabolic panel; Future; Expected date: 11/29/2023  -     Hemoglobin A1C; Future; Expected date: 11/29/2023  -     CBC and differential; Future; Expected date: 11/29/2023  -     Lipid Panel with Direct LDL reflex; Future; Expected date: 11/29/2023  -     TSH, 3rd generation with Free T4 reflex; Future; Expected date: 11/29/2023    6. Financial difficulties  -     Ambulatory Referral to Social Work Care Management Program; Future           Subjective      HPI: This is a 45-year-old female who presents to the office for follow-up of chronic health conditions. She does have a history of type 2 diabetes which has been stable with last hemoglobin A1c of 6.7. She is currently on metformin therapy. She also has history of hypertension and cerebral artery aneurysm.   She does continue to follow with neurosurgery and blood pressure has been mostly stable with chlorthalidone, losartan, and verapamil. She states that she has been going through a stressful time after losing her job recently. She has been looking for new jobs but she is having difficulty paying her rent and affording her medications. Review of Systems   Constitutional:  Negative for chills, fatigue and fever. HENT:  Negative for congestion, ear pain and sinus pressure. Eyes:  Negative for visual disturbance. Respiratory:  Negative for cough, chest tightness and shortness of breath. Cardiovascular:  Negative for chest pain and palpitations. Gastrointestinal:  Negative for diarrhea, nausea and vomiting. Endocrine: Negative for polyuria. Genitourinary:  Negative for dysuria and frequency. Musculoskeletal:  Negative for arthralgias and myalgias. Skin:  Negative for pallor and rash. Neurological:  Negative for dizziness, weakness, light-headedness, numbness and headaches. Psychiatric/Behavioral:  Negative for agitation, behavioral problems and sleep disturbance. All other systems reviewed and are negative.       Current Outpatient Medications on File Prior to Visit   Medication Sig   • amitriptyline (ELAVIL) 50 mg tablet Take 1 tablet (50 mg total) by mouth daily at bedtime   • chlorthalidone 25 mg tablet Take 0.5 tablets (12.5 mg total) by mouth in the morning   • Cholecalciferol (Vitamin D3) 50 MCG (2000 UT) capsule Take 1 capsule (2,000 Units total) by mouth every morning   • famotidine (PEPCID) 40 MG tablet Take 1 tablet (40 mg total) by mouth daily at bedtime   • losartan (COZAAR) 100 MG tablet Take 1 tablet (100 mg total) by mouth in the morning   • metFORMIN (GLUCOPHAGE) 500 mg tablet Take 1 tablet (500 mg total) by mouth 2 (two) times a day with meals   • omeprazole (PriLOSEC) 40 MG capsule Take 1 capsule (40 mg total) by mouth daily before breakfast   • potassium chloride (Klor-Con) 20 mEq packet Take 20 mEq by mouth daily   • verapamil (VERELAN PM) 120 MG 24 hr capsule Take 1 capsule (120 mg total) by mouth in the morning   • rimegepant sulfate (Nurtec) 75 mg TBDP Take one NURTEC 75 mg at onset under tongue. Limit 1 in 24 hours (Patient not taking: Reported on 6/15/2023)       Objective     /94 (BP Location: Left arm, Patient Position: Sitting, Cuff Size: Large)   Pulse 88   Temp 97.6 °F (36.4 °C) (Temporal)   Wt 128 kg (283 lb)   LMP  (LMP Unknown)   SpO2 96%   BMI 47.09 kg/m²     Physical Exam  Vitals and nursing note reviewed. Constitutional:       General: She is not in acute distress. Appearance: She is well-developed. HENT:      Head: Normocephalic and atraumatic. Right Ear: External ear normal.      Left Ear: External ear normal.      Nose: Nose normal.      Mouth/Throat:      Pharynx: No oropharyngeal exudate. Eyes:      Conjunctiva/sclera: Conjunctivae normal.      Pupils: Pupils are equal, round, and reactive to light. Neck:      Thyroid: No thyromegaly. Trachea: No tracheal deviation. Cardiovascular:      Rate and Rhythm: Normal rate and regular rhythm. Heart sounds: Normal heart sounds. No murmur heard. No friction rub. Pulmonary:      Effort: Pulmonary effort is normal. No respiratory distress. Breath sounds: Normal breath sounds. No wheezing or rales. Abdominal:      General: Bowel sounds are normal. There is no distension. Palpations: Abdomen is soft. Tenderness: There is no abdominal tenderness. There is no guarding or rebound. Musculoskeletal:         General: No tenderness. Normal range of motion. Cervical back: Normal range of motion and neck supple. Lymphadenopathy:      Cervical: No cervical adenopathy. Skin:     General: Skin is warm and dry. Findings: No erythema or rash. Neurological:      Mental Status: She is alert and oriented to person, place, and time. Cranial Nerves: No cranial nerve deficit.       Coordination: Coordination normal.   Psychiatric:         Behavior: Behavior normal. Thought Content:  Thought content normal.       Carmen Enriquez PA-C

## 2023-11-30 ENCOUNTER — PATIENT OUTREACH (OUTPATIENT)
Dept: FAMILY MEDICINE CLINIC | Facility: CLINIC | Age: 48
End: 2023-11-30

## 2023-11-30 DIAGNOSIS — Z59.89 DOES NOT HAVE HEALTH INSURANCE: Primary | ICD-10-CM

## 2023-11-30 SDOH — ECONOMIC STABILITY - INCOME SECURITY: OTHER PROBLEMS RELATED TO HOUSING AND ECONOMIC CIRCUMSTANCES: Z59.89

## 2023-11-30 NOTE — PROGRESS NOTES
OP CM called to pt in regards to not having insurance. Pt states she was working for [employer] but she is no longer there. Pt states she is working but they usually do not give her 40 hours. Pt states she was offered health insurance but states it does not kick in until Jan.  Pt works at [employer]. Pt resides alone and states she is now behind in her rent.   Pt

## 2023-12-04 ENCOUNTER — PATIENT OUTREACH (OUTPATIENT)
Dept: FAMILY MEDICINE CLINIC | Facility: CLINIC | Age: 48
End: 2023-12-04

## 2023-12-04 NOTE — PROGRESS NOTES
[facility] completed a chart review prior to contacting patient. Called patient to discuss referral received. Referral need:  Medically uninsured     Unable to reach patient, left a message requesting a call back. If no return call is received [facility] will make a second attempt to reach patient in about 1 week.     Anticipated next contact date: 12/11/23

## 2023-12-05 ENCOUNTER — PATIENT OUTREACH (OUTPATIENT)
Dept: FAMILY MEDICINE CLINIC | Facility: CLINIC | Age: 48
End: 2023-12-05

## 2023-12-05 NOTE — PROGRESS NOTES
Return call received. [facility] completed a chart review. Discuss with patient referral received. Referral need: Medically uninsured, SNAP and [facility]     Patient identity verify by name and date of birth. Introduced myself and explained my role. Patient agrees to Case management out [employer] services. 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 Pt pays [phone] for rent     Offer patient to complete a Medical assistance, SNAP and LIHEAP application online on the [facility] website. Pt Agreeable. Application successfully completed and submitted. Application Number e-Form # [id number]     Pt advice of required verification documents needed for applications to be process. Pt Verbalized understanding, will be gathering documents as requested. [facility] will continue to follow up and assist patient as needed.     Anticipated next contact date:     2/12/23

## 2023-12-08 ENCOUNTER — PATIENT OUTREACH (OUTPATIENT)
Dept: FAMILY MEDICINE CLINIC | Facility: CLINIC | Age: 48
End: 2023-12-08

## 2023-12-08 NOTE — PROGRESS NOTES
[address] [place] [address] 165 completed a chart review prior to contacting patient. Called patient to follow up on medical assistance application. Statues of patient case review on the [facility] website, the following verification documents are being requested. All paychecks from the month of November     Prove of Rent / Rent receipt or lease    Copy of Permanent resident card (front and back)    Copy electric bill    Health sustaining medication from completed and sign by pt PCP. Spoke to patient and updated on the statues of her application. Patient will be emailing all documents to [address] [place] [address] with the exception of Health Sustaining Medication form. [facility] will be emailing and [place] text form to PCP for completion. Patient also provided with contact number for [facility] and   [employer] for rent assistance. [address] [place] [address] will continue to follow up and assist patient as needed.      Anticipated next contact 12/15/23

## 2023-12-15 ENCOUNTER — PATIENT OUTREACH (OUTPATIENT)
Dept: FAMILY MEDICINE CLINIC | Facility: CLINIC | Age: 48
End: 2023-12-15

## 2023-12-15 NOTE — PROGRESS NOTES
[address] [place] [address] completed a chart review prior to contacting patient. Called patient to follow up on Medical assistance, SNAP and [facility] application. No verification documents have been received as previously discuss. Left a message to return her call. If no return call is received [facility] [employer] will make a second attempt to reach patient in about 1 week.     Anticipated next contact date: 12/22/23

## 2023-12-21 ENCOUNTER — TELEPHONE (OUTPATIENT)
Dept: OBGYN CLINIC | Facility: MEDICAL CENTER | Age: 48
End: 2023-12-21

## 2023-12-21 NOTE — TELEPHONE ENCOUNTER
Lvm in regards to appt 1/30 with  stating we will have to reschedule due to the provider's schedule changing that day. Stated to call our office back at [phone] so that we can reschedule.

## 2023-12-22 ENCOUNTER — PATIENT OUTREACH (OUTPATIENT)
Dept: FAMILY MEDICINE CLINIC | Facility: CLINIC | Age: 48
End: 2023-12-22

## 2023-12-22 NOTE — PROGRESS NOTES
[facility] completed a chart review prior to contacting patient.     Called patient to follow up on Medical assistance, SNAP and LIHEAP application.  No verification documents have been received as previously discuss. Left a message to return her call. If no return call is received  covering [facility] will make a final attempt to reach patient in about 2 week.    Unable to reach letter sent.    Anticipated next contact date: 1/5/2024

## 2023-12-22 NOTE — LETTER
December 22, 2023     Virikath BERKLEY Arrington    Patient: [place] Arrington   YOB: 1975   Date of Visit: 12/22/2023       Dear Dr. Arrington:    I am a  with [facility].  I have made several attempts to contact you by phone.  Please call me back at your earliest convenient time so that I can assist you with your care needs. My direct number is [phone]    Sincerely,         MARIA ELENA Mora  Care Management   Outpatient Care Management  [facility] [place] [address]  Phone: [phone]

## 2023-12-26 ENCOUNTER — PATIENT OUTREACH (OUTPATIENT)
Dept: FAMILY MEDICINE CLINIC | Facility: CLINIC | Age: 48
End: 2023-12-26

## 2023-12-26 NOTE — PROGRESS NOTES
Incoming return call received from patient.     Per patient she return all requested financial documents to the department of human service via regular mail.    No other needs concerns identify at the time of call.    Patient informed of [facility] medical leave and coverage.     Covering [facility] to follow up with patient and assist with medical assistance, SNAP and LIHEAP application as needed.

## 2024-01-07 DIAGNOSIS — I10 BENIGN ESSENTIAL HYPERTENSION: ICD-10-CM

## 2024-01-08 RX ORDER — VERAPAMIL HYDROCHLORIDE 120 MG/1
120 CAPSULE, EXTENDED RELEASE ORAL DAILY
Qty: 90 CAPSULE | Refills: 0 | Status: SHIPPED | OUTPATIENT
Start: 2024-01-08

## 2024-01-09 ENCOUNTER — APPOINTMENT (EMERGENCY)
Dept: RADIOLOGY | Facility: HOSPITAL | Age: 49
End: 2024-01-09
Payer: COMMERCIAL

## 2024-01-09 ENCOUNTER — HOSPITAL ENCOUNTER (EMERGENCY)
Facility: HOSPITAL | Age: 49
Discharge: HOME | End: 2024-01-09
Attending: EMERGENCY MEDICINE
Payer: COMMERCIAL

## 2024-01-09 VITALS
SYSTOLIC BLOOD PRESSURE: 131 MMHG | RESPIRATION RATE: 20 BRPM | BODY MASS INDEX: 46.34 KG/M2 | DIASTOLIC BLOOD PRESSURE: 95 MMHG | TEMPERATURE: 99.5 F | OXYGEN SATURATION: 95 % | HEART RATE: 84 BPM | WEIGHT: 278.44 LBS

## 2024-01-09 DIAGNOSIS — J40 BRONCHITIS: Primary | ICD-10-CM

## 2024-01-09 DIAGNOSIS — I67.1 CEREBRAL ANEURYSM, NONRUPTURED: Primary | ICD-10-CM

## 2024-01-09 DIAGNOSIS — Z01.812 PRE-PROCEDURAL LABORATORY EXAMINATIONS: ICD-10-CM

## 2024-01-09 DIAGNOSIS — I67.1 BRAIN ANEURYSM: ICD-10-CM

## 2024-01-09 LAB
2HR DELTA HS TROPONIN: 1 NG/L
ALBUMIN SERPL BCP-MCNC: 4 G/DL (ref 3.5–5)
ALP SERPL-CCNC: 78 U/L (ref 34–104)
ALT SERPL W P-5'-P-CCNC: 15 U/L (ref 7–52)
ANION GAP SERPL CALCULATED.3IONS-SCNC: 8 MMOL/L
AST SERPL W P-5'-P-CCNC: 18 U/L (ref 13–39)
ATRIAL RATE: 99 BPM
BACTERIA UR QL AUTO: ABNORMAL /HPF
BASOPHILS # BLD AUTO: 0.04 THOUSANDS/ÂΜL (ref 0–0.1)
BASOPHILS NFR BLD AUTO: 1 % (ref 0–1)
BILIRUB SERPL-MCNC: 0.37 MG/DL (ref 0.2–1)
BILIRUB UR QL STRIP: NEGATIVE
BUN SERPL-MCNC: 11 MG/DL (ref 5–25)
CALCIUM SERPL-MCNC: 9.1 MG/DL (ref 8.4–10.2)
CARDIAC TROPONIN I PNL SERPL HS: 5 NG/L
CARDIAC TROPONIN I PNL SERPL HS: 6 NG/L
CHLORIDE SERPL-SCNC: 102 MMOL/L (ref 96–108)
CLARITY UR: ABNORMAL
CO2 SERPL-SCNC: 31 MMOL/L (ref 21–32)
COLOR UR: YELLOW
CREAT SERPL-MCNC: 0.7 MG/DL (ref 0.6–1.3)
EOSINOPHIL # BLD AUTO: 0.23 THOUSAND/ÂΜL (ref 0–0.61)
EOSINOPHIL NFR BLD AUTO: 3 % (ref 0–6)
ERYTHROCYTE [DISTWIDTH] IN BLOOD BY AUTOMATED COUNT: 16.7 % (ref 11.6–15.1)
FLUAV RNA RESP QL NAA+PROBE: NEGATIVE
FLUBV RNA RESP QL NAA+PROBE: NEGATIVE
GFR SERPL CREATININE-BSD FRML MDRD: 103 ML/MIN/1.73SQ M
GLUCOSE SERPL-MCNC: 114 MG/DL (ref 65–140)
GLUCOSE UR STRIP-MCNC: NEGATIVE MG/DL
HCT VFR BLD AUTO: 39.3 % (ref 34.8–46.1)
HGB BLD-MCNC: 12.1 G/DL (ref 11.5–15.4)
HGB UR QL STRIP.AUTO: NEGATIVE
IMM GRANULOCYTES # BLD AUTO: 0.02 THOUSAND/UL (ref 0–0.2)
IMM GRANULOCYTES NFR BLD AUTO: 0 % (ref 0–2)
KETONES UR STRIP-MCNC: NEGATIVE MG/DL
LEUKOCYTE ESTERASE UR QL STRIP: NEGATIVE
LIPASE SERPL-CCNC: 22 U/L (ref 11–82)
LYMPHOCYTES # BLD AUTO: 2.53 THOUSANDS/ÂΜL (ref 0.6–4.47)
LYMPHOCYTES NFR BLD AUTO: 33 % (ref 14–44)
MCH RBC QN AUTO: 22.9 PG (ref 26.8–34.3)
MCHC RBC AUTO-ENTMCNC: 30.8 G/DL (ref 31.4–37.4)
MCV RBC AUTO: 74 FL (ref 82–98)
MONOCYTES # BLD AUTO: 0.6 THOUSAND/ÂΜL (ref 0.17–1.22)
MONOCYTES NFR BLD AUTO: 8 % (ref 4–12)
MUCOUS THREADS UR QL AUTO: ABNORMAL
NEUTROPHILS # BLD AUTO: 4.2 THOUSANDS/ÂΜL (ref 1.85–7.62)
NEUTS SEG NFR BLD AUTO: 55 % (ref 43–75)
NITRITE UR QL STRIP: NEGATIVE
NON-SQ EPI CELLS URNS QL MICRO: ABNORMAL /HPF
NRBC BLD AUTO-RTO: 0 /100 WBCS
P AXIS: 74 DEGREES
PH UR STRIP.AUTO: 6 [PH] (ref 4.5–8)
PLATELET # BLD AUTO: 350 THOUSANDS/UL (ref 149–390)
PMV BLD AUTO: 10.3 FL (ref 8.9–12.7)
POTASSIUM SERPL-SCNC: 3.4 MMOL/L (ref 3.5–5.3)
PR INTERVAL: 148 MS
PROT SERPL-MCNC: 7.3 G/DL (ref 6.4–8.4)
PROT UR STRIP-MCNC: ABNORMAL MG/DL
QRS AXIS: 50 DEGREES
QRSD INTERVAL: 70 MS
QT INTERVAL: 308 MS
QTC INTERVAL: 395 MS
RBC # BLD AUTO: 5.29 MILLION/UL (ref 3.81–5.12)
RBC #/AREA URNS AUTO: ABNORMAL /HPF
RSV RNA RESP QL NAA+PROBE: NEGATIVE
S PYO DNA THROAT QL NAA+PROBE: NOT DETECTED
SARS-COV-2 RNA RESP QL NAA+PROBE: NEGATIVE
SODIUM SERPL-SCNC: 141 MMOL/L (ref 135–147)
SP GR UR STRIP.AUTO: >=1.03 (ref 1–1.03)
T WAVE AXIS: 0 DEGREES
UROBILINOGEN UR QL STRIP.AUTO: 0.2 E.U./DL
VENTRICULAR RATE: 99 BPM
WBC # BLD AUTO: 7.62 THOUSAND/UL (ref 4.31–10.16)
WBC #/AREA URNS AUTO: ABNORMAL /HPF

## 2024-01-09 PROCEDURE — 99284 EMERGENCY DEPT VISIT MOD MDM: CPT

## 2024-01-09 PROCEDURE — 80053 COMPREHEN METABOLIC PANEL: CPT | Performed by: PHYSICIAN ASSISTANT

## 2024-01-09 PROCEDURE — 81001 URINALYSIS AUTO W/SCOPE: CPT

## 2024-01-09 PROCEDURE — 83690 ASSAY OF LIPASE: CPT | Performed by: PHYSICIAN ASSISTANT

## 2024-01-09 PROCEDURE — 85025 COMPLETE CBC W/AUTO DIFF WBC: CPT | Performed by: PHYSICIAN ASSISTANT

## 2024-01-09 PROCEDURE — 99285 EMERGENCY DEPT VISIT HI MDM: CPT | Performed by: PHYSICIAN ASSISTANT

## 2024-01-09 PROCEDURE — 93005 ELECTROCARDIOGRAM TRACING: CPT

## 2024-01-09 PROCEDURE — 87651 STREP A DNA AMP PROBE: CPT | Performed by: PHYSICIAN ASSISTANT

## 2024-01-09 PROCEDURE — 71046 X-RAY EXAM CHEST 2 VIEWS: CPT

## 2024-01-09 PROCEDURE — 84484 ASSAY OF TROPONIN QUANT: CPT | Performed by: PHYSICIAN ASSISTANT

## 2024-01-09 PROCEDURE — 36415 COLL VENOUS BLD VENIPUNCTURE: CPT | Performed by: PHYSICIAN ASSISTANT

## 2024-01-09 PROCEDURE — 0241U HB NFCT DS VIR RESP RNA 4 TRGT: CPT | Performed by: PHYSICIAN ASSISTANT

## 2024-01-09 RX ORDER — ALBUTEROL SULFATE 90 UG/1
2 AEROSOL, METERED RESPIRATORY (INHALATION) ONCE
Status: COMPLETED | OUTPATIENT
Start: 2024-01-09 | End: 2024-01-09

## 2024-01-09 RX ORDER — ACETAMINOPHEN 325 MG/1
650 TABLET ORAL ONCE
Status: COMPLETED | OUTPATIENT
Start: 2024-01-09 | End: 2024-01-09

## 2024-01-09 RX ORDER — FLUTICASONE PROPIONATE 50 MCG
1 SPRAY, SUSPENSION (ML) NASAL DAILY
Qty: 16 G | Refills: 0 | Status: SHIPPED | OUTPATIENT
Start: 2024-01-09

## 2024-01-09 RX ORDER — AZITHROMYCIN 250 MG/1
TABLET, FILM COATED ORAL
Qty: 6 TABLET | Refills: 0 | Status: SHIPPED | OUTPATIENT
Start: 2024-01-09 | End: 2024-01-13

## 2024-01-09 RX ORDER — METHYLPREDNISOLONE 4 MG/1
TABLET ORAL
Qty: 21 TABLET | Refills: 0 | Status: SHIPPED | OUTPATIENT
Start: 2024-01-09

## 2024-01-09 RX ADMIN — ACETAMINOPHEN 325MG 650 MG: 325 TABLET ORAL at 16:04

## 2024-01-09 RX ADMIN — ALBUTEROL SULFATE 2 PUFF: 90 AEROSOL, METERED RESPIRATORY (INHALATION) at 17:56

## 2024-01-09 NOTE — PROGRESS NOTES
Received a communication from [facility] requesting new order for CTA as original will be  before scheduled imaging. Replaced order as requested.
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
## 2024-01-10 ENCOUNTER — TELEPHONE (OUTPATIENT)
Age: 49
End: 2024-01-10

## 2024-01-10 LAB
ATRIAL RATE: 91 BPM
P AXIS: 61 DEGREES
PR INTERVAL: 160 MS
QRS AXIS: 58 DEGREES
QRSD INTERVAL: 70 MS
QT INTERVAL: 394 MS
QTC INTERVAL: 484 MS
T WAVE AXIS: 17 DEGREES
VENTRICULAR RATE: 91 BPM

## 2024-01-10 NOTE — TELEPHONE ENCOUNTER
Pt called in to let Jamie Rizo that she was in the ER on 1/9 cough/bronchitis. Patient is scheduled on 1/11 to follow up.

## 2024-01-11 ENCOUNTER — OFFICE VISIT (OUTPATIENT)
Dept: FAMILY MEDICINE CLINIC | Facility: CLINIC | Age: 49
End: 2024-01-11
Payer: COMMERCIAL

## 2024-01-11 ENCOUNTER — TELEPHONE (OUTPATIENT)
Age: 49
End: 2024-01-11

## 2024-01-11 VITALS
HEART RATE: 112 BPM | HEIGHT: 65 IN | WEIGHT: 276 LBS | TEMPERATURE: 97.9 F | BODY MASS INDEX: 45.98 KG/M2 | DIASTOLIC BLOOD PRESSURE: 90 MMHG | OXYGEN SATURATION: 98 % | SYSTOLIC BLOOD PRESSURE: 140 MMHG

## 2024-01-11 DIAGNOSIS — E11.9 TYPE 2 DIABETES MELLITUS WITHOUT COMPLICATION, WITHOUT LONG-TERM CURRENT USE OF INSULIN (HCC): ICD-10-CM

## 2024-01-11 DIAGNOSIS — E66.01 MORBID OBESITY WITH BMI OF 40.0-44.9, ADULT (HCC): ICD-10-CM

## 2024-01-11 DIAGNOSIS — J40 BRONCHITIS: Primary | ICD-10-CM

## 2024-01-11 DIAGNOSIS — E87.6 HYPOKALEMIA: ICD-10-CM

## 2024-01-11 DIAGNOSIS — I10 BENIGN ESSENTIAL HYPERTENSION: ICD-10-CM

## 2024-01-11 PROBLEM — M19.90 OSTEOARTHRITIS: Status: ACTIVE | Noted: 2024-01-11

## 2024-01-11 PROBLEM — J45.21 MILD INTERMITTENT ASTHMA WITH ACUTE EXACERBATION: Status: ACTIVE | Noted: 2024-01-11

## 2024-01-11 PROCEDURE — 99214 OFFICE O/P EST MOD 30 MIN: CPT | Performed by: PHYSICIAN ASSISTANT

## 2024-01-11 NOTE — PROGRESS NOTES
Name: Jamaica Arrington      : 1975      MRN: [id number]  Encounter Provider: Jamie Rzio PA-C  Encounter Date: 2024   Encounter department: [facility] PRIMARY CARE    Assessment & Plan     Patient Instructions   Assessment/plan:  1.  Bronchitis-presently stable.  Patient just started on antibiotic, steroid, and inhaler yesterday.  She is seeing some benefit and will continue.  2.  Hypokalemia-potassium was stable at 3.4 in the hospital.  Will reassess with labs in April as scheduled.  3.  Type 2 diabetes-stable, no medication changes.  Continue diet and exercise efforts and will follow-up with labs in 3 months.  4.  Osteoarthritis-patient is getting some swelling in the ankles especially after working more than 5 consecutive days.  A note will be provided for her to try to limit consecutive shifts to 5 days.  5.  Benign essential hypertension-presently stable.  Recommend avoiding decongestants.    1. Bronchitis    2. Hypokalemia    3. Type 2 diabetes mellitus without complication, without long-term current use of insulin ([facility])    4. Morbid obesity with BMI of 40.0-44.9, adult ([facility])    5. Benign essential hypertension           Subjective      HPI: This is a 48-year-old female that presents to the office for ER follow-up of bronchitis.  She was seen in emergency department 2 days ago and had COVID testing, flu testing, RSV testing which was negative.  She was diagnosed with bronchitis and asthma exacerbation and started on Zithromax, prednisone, and albuterol inhaler.  Patient states that she just got the prednisone and the antibiotic yesterday to begin.  She has felt a little bit better since but it is still early on.  She has continued working.  She has not had any trouble breathing at home.  She is due for her next inhaler therapy.  She does have multiple comorbidities including osteoarthritis, asthma, diabetes, and hypertension.  She feels that she has been having trouble working more  than 5 consecutive days and sometimes her ankles began to swell.  She is requesting a note to limit her work to 5 consecutive days.      Review of Systems   Constitutional:  Positive for fatigue. Negative for chills and fever.   HENT:  Positive for congestion. Negative for ear pain and sinus pressure.    Eyes:  Negative for visual disturbance.   Respiratory:  Positive for cough and wheezing. Negative for chest tightness and shortness of breath.    Cardiovascular:  Negative for chest pain and palpitations.   Gastrointestinal:  Negative for diarrhea, nausea and vomiting.   Endocrine: Negative for polyuria.   Genitourinary:  Negative for dysuria and frequency.   Musculoskeletal:  Negative for arthralgias and myalgias.   Skin:  Negative for pallor and rash.   Neurological:  Negative for dizziness, weakness, light-headedness, numbness and headaches.   Psychiatric/Behavioral:  Negative for agitation, behavioral problems and sleep disturbance.    All other systems reviewed and are negative.      Current Outpatient Medications on File Prior to Visit   Medication Sig   • amitriptyline (ELAVIL) 50 mg tablet Take 1 tablet (50 mg total) by mouth daily at bedtime   • azithromycin (ZITHROMAX) 250 mg tablet Take 2 tablets today then 1 tablet daily x 4 days   • chlorthalidone 25 mg tablet Take 0.5 tablets (12.5 mg total) by mouth in the morning   • Cholecalciferol (Vitamin D3) 50 MCG (2000 UT) capsule Take 1 capsule (2,000 Units total) by mouth every morning   • famotidine (PEPCID) 40 MG tablet Take 1 tablet (40 mg total) by mouth daily at bedtime   • fluticasone (FLONASE) 50 mcg/act nasal spray 1 spray into each nostril daily   • losartan (COZAAR) 100 MG tablet Take 1 tablet (100 mg total) by mouth in the morning   • methylPREDNISolone 4 MG tablet therapy pack Use as directed on package   • omeprazole (PriLOSEC) 40 MG capsule Take 1 capsule (40 mg total) by mouth daily before breakfast   • potassium chloride (Klor-Con) 20 mEq  "packet Take 20 mEq by mouth daily   • verapamil (VERELAN PM) 120 MG 24 hr capsule Take 1 capsule (120 mg total) by mouth in the morning   • metFORMIN (GLUCOPHAGE) 500 mg tablet Take 1 tablet (500 mg total) by mouth 2 (two) times a day with meals (Patient not taking: Reported on 1/11/2024)   • [DISCONTINUED] rimegepant sulfate (Nurtec) 75 mg TBDP Take one NURTEC 75 mg at onset under tongue. Limit 1 in 24 hours (Patient not taking: Reported on 6/15/2023)       Objective     /90 (BP Location: Left arm, Patient Position: Sitting, Cuff Size: Extra-Large)   Pulse (!) 112   Temp 97.9 °F (36.6 °C) (Tympanic)   Ht 5' 5\" (1.651 m)   Wt 125 kg (276 lb)   LMP  (LMP Unknown)   SpO2 98%   BMI 45.93 kg/m²     Physical Exam  Vitals and nursing note reviewed.   Constitutional:       General: She is not in acute distress.     Appearance: She is well-developed.   HENT:      Head: Normocephalic and atraumatic.      Right Ear: External ear normal.      Left Ear: External ear normal.      Nose: Nose normal.      Mouth/Throat:      Pharynx: No oropharyngeal exudate.   Eyes:      Conjunctiva/sclera: Conjunctivae normal.      Pupils: Pupils are equal, round, and reactive to light.   Neck:      Thyroid: No thyromegaly.      Trachea: No tracheal deviation.   Cardiovascular:      Rate and Rhythm: Normal rate and regular rhythm.      Heart sounds: Normal heart sounds. No murmur heard.     No friction rub.   Pulmonary:      Effort: Pulmonary effort is normal. No respiratory distress.      Breath sounds: Wheezing present. No rales.      Comments: Crackles and wheezes bilateral lung fields.  Abdominal:      General: Bowel sounds are normal. There is no distension.      Palpations: Abdomen is soft.      Tenderness: There is no abdominal tenderness. There is no guarding or rebound.   Musculoskeletal:         General: No tenderness. Normal range of motion.      Cervical back: Normal range of motion and neck supple.   Lymphadenopathy:     "  Cervical: No cervical adenopathy.   Skin:     General: Skin is warm and dry.      Findings: No erythema or rash.   Neurological:      Mental Status: She is alert and oriented to person, place, and time.      Cranial Nerves: No cranial nerve deficit.      Coordination: Coordination normal.   Psychiatric:         Behavior: Behavior normal.         Thought Content: Thought content normal.       Jamie Rizo PA-C

## 2024-01-11 NOTE — TELEPHONE ENCOUNTER
Eufemia from Dr. Junaid rivers office called to state, Patient is not a Patient of Dr. Nicole. Advise Eufemia if any further questions at [phone]

## 2024-01-11 NOTE — LETTER
January 11, 2024     Patient: Jamaica Arrington  YOB: 1975  Date of Visit: 1/11/2024      To Whom it May Concern:    Jamaica Arrington is under my professional care. Jamaica was seen in my office on 1/11/2024. Jamaica has a history of osteoarthritis, Asthma, Diabetes, and hypertension.  Because of her medical conditions I do not feel she is able to work more than 5 consecutive days.  Please consider work accommodations for her. Thank you.    If you have any questions or concerns, please don't hesitate to call.         Sincerely,          Jamie Rizo PA-C        CC: No Recipients

## 2024-01-11 NOTE — PATIENT INSTRUCTIONS
Assessment/plan:  1.  Bronchitis-presently stable.  Patient just started on antibiotic, steroid, and inhaler yesterday.  She is seeing some benefit and will continue.  2.  Hypokalemia-potassium was stable at 3.4 in the hospital.  Will reassess with labs in April as scheduled.  3.  Type 2 diabetes-stable, no medication changes.  Continue diet and exercise efforts and will follow-up with labs in 3 months.  4.  Osteoarthritis-patient is getting some swelling in the ankles especially after working more than 5 consecutive days.  A note will be provided for her to try to limit consecutive shifts to 5 days.  5.  Benign essential hypertension-presently stable.  Recommend avoiding decongestants.

## 2024-01-15 ENCOUNTER — TELEPHONE (OUTPATIENT)
Dept: NEUROSURGERY | Facility: CLINIC | Age: 49
End: 2024-01-15

## 2024-01-15 NOTE — TELEPHONE ENCOUNTER
Pt left VM, returned VM and left message to return our call and advised CT appt and office appt dates and times

## 2024-01-17 ENCOUNTER — PATIENT OUTREACH (OUTPATIENT)
Dept: FAMILY MEDICINE CLINIC | Facility: CLINIC | Age: 49
End: 2024-01-17

## 2024-01-17 NOTE — PROGRESS NOTES
CMOC completed a chart review prior to contacting patient.    Called patient to follow up on Medical Assistance, SNAP and LIHEAP application.     Statues of application review on the [employer] web site. All applications were denied.     Unable to reach patient, left a message requesting a call back. If no return call is received CMOC will make a second attempt to reach patient in around 1 week.    Anticipated next contact date: 1/24/24

## 2024-01-17 NOTE — TELEPHONE ENCOUNTER
Pt called in stating that she saw Jamie Rizo on 1/11/24 and was told to call back with an update. Pt states she is starting to feel better but is still having a cough. Please advise.

## 2024-01-17 NOTE — TELEPHONE ENCOUNTER
Patient returned phone call. I attempted to transfer her to in the office clerical department. Please call her back to advise

## 2024-01-24 ENCOUNTER — HOSPITAL ENCOUNTER (OUTPATIENT)
Dept: CT IMAGING | Facility: HOSPITAL | Age: 49
Discharge: HOME | End: 2024-01-24
Attending: NEUROLOGICAL SURGERY
Payer: COMMERCIAL

## 2024-01-24 ENCOUNTER — PATIENT OUTREACH (OUTPATIENT)
Dept: FAMILY MEDICINE CLINIC | Facility: CLINIC | Age: 49
End: 2024-01-24

## 2024-01-24 DIAGNOSIS — I67.1 CEREBRAL ANEURYSM, NONRUPTURED: ICD-10-CM

## 2024-01-24 DIAGNOSIS — I67.1 BRAIN ANEURYSM: ICD-10-CM

## 2024-01-24 PROCEDURE — 70496 CT ANGIOGRAPHY HEAD: CPT

## 2024-01-24 PROCEDURE — G1004 CDSM NDSC: HCPCS

## 2024-01-24 RX ADMIN — IOHEXOL 85 ML: 350 INJECTION, SOLUTION INTRAVENOUS at 08:02

## 2024-01-24 NOTE — PROGRESS NOTES
CMOC completed a chart review prior to contacting patient.    2ND attempt made to reach patient without success to follow up on Medical Assistance, SNAP and LIHEAP application.     Statues of application review on the [employer] web site. All applications were denied.     Unable to reach letter and Mychart message sent to patient.    If no return call is received, CMOC will close referral due to lack of patient engagement.

## 2024-01-24 NOTE — LETTER
01/24/24    Dear Jamaica Arrington,    [facility] a  [facility] I have made several attempts to contact you by phone.  Please call me back at your earliest convenient time so that I can assist you with your care needs. My direct number is [phone]    Sincerely,         MARIA ELENA Mora  Care Management   Outpatient Care Management  [place] [id number] [facility]  [address] [place] [address]  Phone: [phone]

## 2024-01-25 ENCOUNTER — PATIENT OUTREACH (OUTPATIENT)
Dept: FAMILY MEDICINE CLINIC | Facility: CLINIC | Age: 49
End: 2024-01-25

## 2024-01-25 NOTE — PROGRESS NOTES
`incoming return call received. Per patient she was denied MA,SNAP and LIHEAP due to being over the income limits.     Offer patient a [employer] application, pt agreeable, email sent to financial counselors to mail an application to patients home.    Will also email to patient a [place] of [place] in [place] area. Pt appreciative for [facility] assistance. Pt also referred thorough find Help     CMOC will continue to follow up with patient to assist with [employer] application as needed.    Anticipated next contact date: 2/1/24

## 2024-01-27 ENCOUNTER — PATIENT OUTREACH (OUTPATIENT)
Dept: FAMILY MEDICINE CLINIC | Facility: CLINIC | Age: 49
End: 2024-01-27

## 2024-01-27 NOTE — PROGRESS NOTES
Incoming email received from Financial counselor confirming [facility] application was mail to patient's home.     CMOC will continue to follow up and assist patient as needed.

## 2024-01-31 ENCOUNTER — TELEPHONE (OUTPATIENT)
Dept: OBGYN CLINIC | Facility: CLINIC | Age: 49
End: 2024-01-31

## 2024-01-31 ENCOUNTER — OFFICE VISIT (OUTPATIENT)
Dept: NEUROSURGERY | Facility: CLINIC | Age: 49
End: 2024-01-31
Payer: COMMERCIAL

## 2024-01-31 VITALS
HEIGHT: 65 IN | BODY MASS INDEX: 46.98 KG/M2 | TEMPERATURE: 98.3 F | OXYGEN SATURATION: 97 % | SYSTOLIC BLOOD PRESSURE: 140 MMHG | HEART RATE: 78 BPM | RESPIRATION RATE: 16 BRPM | WEIGHT: 282 LBS | DIASTOLIC BLOOD PRESSURE: 98 MMHG

## 2024-01-31 DIAGNOSIS — I67.1 CEREBRAL ANEURYSM, NONRUPTURED: Primary | ICD-10-CM

## 2024-01-31 PROCEDURE — 99214 OFFICE O/P EST MOD 30 MIN: CPT | Performed by: NURSE PRACTITIONER

## 2024-01-31 NOTE — PROGRESS NOTES
Neurosurgery Office Note  Jamaica Arrington 48 y.o. female MRN: [id number]      Assessment/Plan     Cerebral aneurysm, nonruptured  Patient seen outpatient office today for 1 year follow-up with CTA status post clipped left MCA aneurysm  S/p elective left MCA aneurysm clipping in 2018 at [facility].  She continues to complain of intermittent left-sided headaches  Exam is non-focal.     Imaging:  CTA head w/wo, 1/24/24:No significant interval change since prior examination. Status post left MCA aneurysm clipping with stable small outpouchings along the region of the aneurysm clip as described above.     Plan:  Reviewed CTA imaging extensively with patient.  Recommend continuing healthy habits such as eating a balanced diet and exercising regularly  Extensively discussed natural history of aneurysms.  Discussed modifiable risk factors including hypertension, hyperlipidemia, and smoking.  Patient does have history of hypertension on medication.  She states her younger sister has a cerebral aneurysm.  She denies any history of hyperlipidemia or smoking.  Discussed signs symptoms of aneurysm rupture including severe, sudden onset headache, neck pain, nausea and vomiting, and seizure.  Reiterated that the symptoms should prompt patient to visit in emergency department immediately  Patient will follow-up in 1 year with repeat CTA head for ongoing surveillance or sooner symptoms worsen as joint appointment with Dr. Alex  Patient made aware to seek care sooner if she develops any new or worsening neurological change or red flag signs  Patient made aware to contact neurosurgery with any further questions or concerns.       Diagnoses and all orders for this visit:    Cerebral aneurysm, nonruptured  -     CTA head w wo contrast; Future  -     BUN; Future  -     Creatinine, serum; Future          I have spent a total time of 30 minutes on 01/31/24 in caring for this patient including Diagnostic results, Prognosis, Instructions for  management, Patient and family education, Importance of tx compliance, Risk factor reductions, Impressions, Counseling / Coordination of care, Documenting in the medical record, Reviewing / ordering tests, medicine, procedures  , Obtaining or reviewing history  , and Communicating with other healthcare professionals .      CHIEF COMPLAINT    Chief Complaint   Patient presents with    Follow-up     With CTA       HISTORY    History of Present Illness     48 y.o. year old female with past medical history significant for GERD, type 2 diabetes, asthma, hypertension, migraines, osteoarthritis, obesity, and status post left MCA aneurysm clipping.  Patient seen in outpatient office today for 1 year follow-up with repeat CTA.    Patient was originally seen by her service in November 2022.  She had elective left MCA aneurysm clipping in 2018 at [facility], due to her insurance changing she was sent to our service and we have been following her since.  Per chart review her aneurysm was an incidental finding during workup for headaches.  She never suffered from any subarachnoid hemorrhage.  No follow-up angiogram was done postprocedure and there was some concern for venous contamination or possible residual aneurysm on her follow-up CTA.  She ultimately underwent a cerebral angiogram on 1/6/2023 by Dr. Alex, this showed a small 2 x 3 mm left MCA aneurysm dogear/recurrence.  Small sub-2 mm left M2 aneurysm.  No further treatment was recommended but continued observation.    She does endorse her younger sister having a cerebral aneurysm.  She does endorse history of hypertension on medication.  She denies any history of hyperlipidemia or smoking.  She denies any recent falls or traumas and states her balance is off but she does use a cane.  She continues to endorse occasional left-sided headaches at site of previous craniotomy.  She also endorses generalized weakness.  She becomes very tearful during visit about her current job  situation and money strains.  She offers no other complaints.    HPI    See Discussion    REVIEW OF SYSTEMS    Review of Systems   Constitutional:  Positive for fatigue.   HENT:  Negative for tinnitus.    Eyes:  Negative for visual disturbance.   Respiratory:  Positive for shortness of breath (with walking) and wheezing.    Gastrointestinal:  Positive for abdominal pain.   Genitourinary:  Positive for vaginal bleeding.   Musculoskeletal:  Positive for arthralgias (knees) and gait problem (knee pain, a little unsteady, uses cane).   Neurological:  Positive for headaches (mostly every day, left sided, moderate). Negative for seizures, weakness and numbness.   Psychiatric/Behavioral:          Memory trouble       ROS reviewed with patient and agree and changes were made as needed    Meds/Allergies     Current Outpatient Medications   Medication Sig Dispense Refill    amitriptyline (ELAVIL) 50 mg tablet Take 1 tablet (50 mg total) by mouth daily at bedtime 90 tablet 3    chlorthalidone 25 mg tablet Take 0.5 tablets (12.5 mg total) by mouth in the morning 180 tablet 1    Cholecalciferol (Vitamin D3) 50 MCG (2000 UT) capsule Take 1 capsule (2,000 Units total) by mouth every morning 90 capsule 3    famotidine (PEPCID) 40 MG tablet Take 1 tablet (40 mg total) by mouth daily at bedtime 90 tablet 3    fluticasone (FLONASE) 50 mcg/act nasal spray 1 spray into each nostril daily 16 g 0    losartan (COZAAR) 100 MG tablet Take 1 tablet (100 mg total) by mouth in the morning 90 tablet 3    omeprazole (PriLOSEC) 40 MG capsule Take 1 capsule (40 mg total) by mouth daily before breakfast 90 capsule 3    verapamil (VERELAN PM) 120 MG 24 hr capsule Take 1 capsule (120 mg total) by mouth in the morning 90 capsule 0    metFORMIN (GLUCOPHAGE) 500 mg tablet Take 1 tablet (500 mg total) by mouth 2 (two) times a day with meals (Patient not taking: Reported on 1/11/2024) 180 tablet 3    methylPREDNISolone 4 MG tablet therapy pack Use as  "directed on package (Patient not taking: Reported on 1/31/2024) 21 tablet 0    potassium chloride (Klor-Con) 20 mEq packet Take 20 mEq by mouth daily (Patient not taking: Reported on 1/31/2024) 30 each 6     No current facility-administered medications for this visit.       No Known Allergies    PAST HISTORY    Past Medical History:   Diagnosis Date    Anemia     Aneurysm (HCC)     Anxiety     Arthritis     Depression     Headache(784.0)     Hypertension     Memory loss     Migraine     Morbid obesity with BMI of 45.0-49.9, adult (HCC)        Past Surgical History:   Procedure Laterality Date    BRAIN SURGERY      BREAST CYST EXCISION Right 2021    HYSTERECTOMY  2020    AUB; still has ovaries    IR CEREBRAL ANGIOGRAPHY  01/06/2023       Social History     Tobacco Use    Smoking status: Never    Smokeless tobacco: Never   Vaping Use    Vaping status: Never Used   Substance Use Topics    Alcohol use: Never    Drug use: Never       Family History   Problem Relation Age of Onset    Stroke Mother     Arthritis Mother     Migraines Sister     Aneurysm Sister     Hypertension Sister     No Known Problems Maternal Grandmother     No Known Problems Maternal Grandfather     No Known Problems Paternal Grandmother     No Known Problems Paternal Grandfather     Breast cancer Maternal Aunt         age unknown    Cancer Maternal Aunt         Breast cancer    Colon cancer Neg Hx     Ovarian cancer Neg Hx     Uterine cancer Neg Hx          Above history personally reviewed.       EXAM    Vitals:Blood pressure 140/98, pulse 78, temperature 98.3 °F (36.8 °C), temperature source Temporal, resp. rate 16, height 5' 5\" (1.651 m), weight 128 kg (282 lb), SpO2 97%.,Body mass index is 46.93 kg/m².     Physical Exam  Vitals reviewed.   Constitutional:       General: She is awake. She is not in acute distress.     Appearance: Normal appearance. She is not ill-appearing.   HENT:      Head: Normocephalic and atraumatic.   Eyes:      Extraocular " Movements: Extraocular movements intact and EOM normal.      Conjunctiva/sclera: Conjunctivae normal.      Pupils: Pupils are equal, round, and reactive to light.   Cardiovascular:      Rate and Rhythm: Normal rate.   Pulmonary:      Effort: Pulmonary effort is normal. No respiratory distress.   Chest:      Chest wall: No tenderness.   Abdominal:      General: There is no distension.      Palpations: Abdomen is soft.      Tenderness: There is no abdominal tenderness.   Musculoskeletal:         General: Normal range of motion.      Cervical back: Normal range of motion and neck supple.   Skin:     General: Skin is warm and dry.   Neurological:      Mental Status: She is alert and oriented to person, place, and time.      Motor: Motor strength is normal.     Coordination: Finger-Nose-Finger Test normal.      Deep Tendon Reflexes:      Reflex Scores:       Bicep reflexes are 1+ on the right side and 1+ on the left side.       Patellar reflexes are 1+ on the right side and 1+ on the left side.  Psychiatric:         Attention and Perception: Attention and perception normal.         Mood and Affect: Mood and affect normal.         Speech: Speech normal.         Behavior: Behavior normal. Behavior is cooperative.         Thought Content: Thought content normal.         Cognition and Memory: Cognition and memory normal.         Judgment: Judgment normal.         Neurologic Exam     Mental Status   Oriented to person, place, and time.   Follows 2 step commands.   Attention: normal. Concentration: normal.   Speech: speech is normal   Level of consciousness: alert  Knowledge: good. Able to perform simple calculations.   Able to name object. Normal comprehension.     Cranial Nerves     CN III, IV, VI   Pupils are equal, round, and reactive to light.  Extraocular motions are normal.   CN III: no CN III palsy  CN VI: no CN VI palsy  Nystagmus: none   Diplopia: none  Conjugate gaze: present    CN V   Facial sensation intact.      CN VII   Facial expression full, symmetric.     CN VIII   CN VIII normal.   Hearing: intact    CN IX, X   CN IX normal.     CN XI   CN XI normal.     CN XII   CN XII normal.     Motor Exam   Muscle bulk: normal  Overall muscle tone: normal  Right arm pronator drift: absent  Left arm pronator drift: absent    Strength   Strength 5/5 throughout.     Sensory Exam   Light touch normal.     Gait, Coordination, and Reflexes     Coordination   Finger to nose coordination: normal    Tremor   Resting tremor: absent  Intention tremor: absent  Action tremor: absent    Reflexes   Right biceps: 1+  Left biceps: 1+  Right patellar: 1+  Left patellar: 1+  Right Ruvalcaba: absent  Left Ruvalcaba: absent  Right ankle clonus: absent  Left ankle clonus: absent        MEDICAL DECISION MAKING    Imaging Studies:     CTA head w wo contrast    Result Date: 1/26/2024  Narrative: CTA BRAIN WITH CONTRAST INDICATION: I67.1: Cerebral aneurysm, nonruptured COMPARISON:   9/4/2023 and angiogram from 1/6/2023 and MR angiogram from 12/7/2022. TECHNIQUE:   Post contrast imaging was performed after administration of iodinated contrast through the neck and brain. Post contrast axial 0.625 mm images timed to opacify the arterial system. 3D rendering was performed on an independent workstation.   MIP reconstructions performed. Coronal reconstructions were performed of the noncontrast portion of the brain. Radiation dose length product (DLP) for this visit:  1126 mGy-cm .  This examination, like all CT scans performed in the [facility], was performed utilizing techniques to minimize radiation dose exposure, including the use of iterative reconstruction and automated exposure control. IV Contrast:  85 mL of iohexol (OMNIPAQUE) IMAGE QUALITY:   Diagnostic FINDINGS: NONCONTRAST BRAIN: PARENCHYMA:  No intracranial mass, mass effect or midline shift. No CT signs of acute infarction.  No acute parenchymal hemorrhage. VENTRICLES AND EXTRA-AXIAL  SPACES:  Normal for the patient's age. VISUALIZED ORBITS: Normal visualized orbits. PARANASAL SINUSES: Normal visualized paranasal sinuses. INTRACRANIAL VASCULATURE INTERNAL CAROTID ARTERIES:  Normal enhancement of the intracranial portions of the internal carotid arteries.  Normal ophthalmic artery origins.  Normal ICA terminus. There appears to be a tiny infundibular origin of the right artery, stable. ANTERIOR CIRCULATION:  Symmetric A1 segments and anterior cerebral arteries with normal enhancement.  Normal anterior communicating artery. MIDDLE CEREBRAL ARTERY CIRCULATION: The patient is status post left MCA aneurysm clipping. There is stable outpouchings noted adjacent to the aneurysm clip allowing for background venous contamination. An aneurysm on series 9, image 168 measures 3 mm. There is an aneurysm more distally along the M2 division measuring 2 to 3 mm on series 9, image 162. The middle cerebral arteries otherwise appear patent without evidence for high-grade stenosis or focal occlusion. DISTAL VERTEBRAL ARTERIES:  Normal distal vertebral arteries.  Posterior inferior cerebellar artery origins are normal. Normal vertebral basilar junction. BASILAR ARTERY:  Basilar artery is normal in caliber.  Normal superior cerebellar arteries. POSTERIOR CEREBRAL ARTERIES: Both posterior cerebral arteries arises from the basilar tip.  Both arteries demonstrate normal enhancement.   Normal posterior communicating arteries. DURAL VENOUS SINUSES:  Normal. NON VASCULAR ANATOMY BONY STRUCTURES: Status post left frontotemporal craniotomy.     Impression: No significant interval change since prior examination. Status post left MCA aneurysm clipping with stable small outpouchings along the region of the aneurysm clip as described above. Workstation performed: [id number]     XR chest 2 views    Result Date: 1/9/2024  Narrative: CHEST INDICATION:   cough and fever. COMPARISON:  None EXAM PERFORMED/VIEWS:  XR CHEST PA & LATERAL  FINDINGS: Cardiomediastinal silhouette appears unremarkable. The lungs are clear.  No pneumothorax or pleural effusion. Osseous structures appear within normal limits for patient age.     Impression: No acute cardiopulmonary disease. Workstation performed: [id number]       I have personally reviewed pertinent reports.   and I have personally reviewed pertinent films in PACS

## 2024-01-31 NOTE — TELEPHONE ENCOUNTER
Pt called stating she had a hysterectomy in 2020 and is now having vaginal bleeding. Pt was last seen by Zoey 10/25/23 and prior to that was a patient of Peyton's from [facility]. Pt needs to be evaluated but would I put her on the schedule for Zoey or one of the doctors?  Please advise.

## 2024-01-31 NOTE — ASSESSMENT & PLAN NOTE
Patient seen outpatient office today for 1 year follow-up with CTA status post clipped left MCA aneurysm  S/p elective left MCA aneurysm clipping in 2018 at [facility].  She continues to complain of intermittent left-sided headaches  Exam is non-focal.     Imaging:  CTA head w/wo, 1/24/24:No significant interval change since prior examination. Status post left MCA aneurysm clipping with stable small outpouchings along the region of the aneurysm clip as described above.     Plan:  Reviewed CTA imaging extensively with patient.  Recommend continuing healthy habits such as eating a balanced diet and exercising regularly  Extensively discussed natural history of aneurysms.  Discussed modifiable risk factors including hypertension, hyperlipidemia, and smoking.  Patient does have history of hypertension on medication.  She states her younger sister has a cerebral aneurysm.  She denies any history of hyperlipidemia or smoking.  Discussed signs symptoms of aneurysm rupture including severe, sudden onset headache, neck pain, nausea and vomiting, and seizure.  Reiterated that the symptoms should prompt patient to visit in emergency department immediately  Patient will follow-up in 1 year with repeat CTA head for ongoing surveillance or sooner symptoms worsen as joint appointment with Dr. Alex  Patient made aware to seek care sooner if she develops any new or worsening neurological change or red flag signs  Patient made aware to contact neurosurgery with any further questions or concerns.

## 2024-02-01 ENCOUNTER — PATIENT OUTREACH (OUTPATIENT)
Dept: FAMILY MEDICINE CLINIC | Facility: CLINIC | Age: 49
End: 2024-02-01

## 2024-02-01 NOTE — PROGRESS NOTES
CMOC completed a chart review prior to contacting patient.    Called patient to follow up on [facility] application. Unable to reach patient, left a message requesting a call back.    If no return call is received,CMOC will make a second attempt to reach patient in about 1 week.    Anticipated next contact date: 2/8/24

## 2024-02-02 ENCOUNTER — OFFICE VISIT (OUTPATIENT)
Dept: OBGYN CLINIC | Facility: MEDICAL CENTER | Age: 49
End: 2024-02-02
Payer: COMMERCIAL

## 2024-02-02 VITALS
SYSTOLIC BLOOD PRESSURE: 146 MMHG | BODY MASS INDEX: 46.98 KG/M2 | DIASTOLIC BLOOD PRESSURE: 93 MMHG | HEART RATE: 98 BPM | WEIGHT: 282 LBS | HEIGHT: 65 IN

## 2024-02-02 DIAGNOSIS — M17.0 BILATERAL PRIMARY OSTEOARTHRITIS OF KNEE: Primary | ICD-10-CM

## 2024-02-02 DIAGNOSIS — E66.01 OBESITY, MORBID (MORE THAN 100 LBS OVER IDEAL WEIGHT OR BMI > 40) (HCC): ICD-10-CM

## 2024-02-02 PROCEDURE — 99213 OFFICE O/P EST LOW 20 MIN: CPT | Performed by: ORTHOPAEDIC SURGERY

## 2024-02-02 PROCEDURE — 20610 DRAIN/INJ JOINT/BURSA W/O US: CPT | Performed by: ORTHOPAEDIC SURGERY

## 2024-02-02 RX ADMIN — LIDOCAINE HYDROCHLORIDE 3 ML: 10 INJECTION, SOLUTION INFILTRATION; PERINEURAL at 15:45

## 2024-02-02 RX ADMIN — METHYLPREDNISOLONE ACETATE 2 ML: 40 INJECTION, SUSPENSION INTRA-ARTICULAR; INTRALESIONAL; INTRAMUSCULAR; SOFT TISSUE at 15:45

## 2024-02-02 NOTE — PROGRESS NOTES
Assessment/Plan:  1. Bilateral primary osteoarthritis of knee    2. Obesity, morbid (more than 100 lbs over ideal weight or BMI > 40) ([facility])      Orders Placed This Encounter   Procedures    Large joint arthrocentesis       Patient has severe bilateral knee osteoarthritis.  Patient received bilateral knee steroid injections today. Tolerated the procedures well. Post injection instructions reviewed.  Patient is not a surgical candidate at this time due to BMI >40.   Continue to take Tylenol 1,000mg by mouth every 8 hours as needed for pain.  Do not exceed 3,000mg per day.    Continue diclofenac for pain control.   Continue activity as tolerated.  Patient is happy with the results that she gets from steroid injections for the time being.    Return in about 3 months (around 5/2/2024) for Recheck, repeat CSI.    I answered all of the patient's questions during the visit and provided education of the patient's condition during the visit.  The patient verbalized understanding of the information given and agrees with the plan.  This note was dictated using [employer] software.  It may contain errors including improperly dictated words.  Please contact physician directly for any questions.    Subjective   Chief Complaint:   Chief Complaint   Patient presents with    Left Knee - Follow-up    Right Knee - Follow-up       [facility]  Jamaica Arrington is a 48 y.o. female who presents for follow up for severe bilateral knee osteoarthritis.  Patient was last seen 11/1/2023 patient received bilateral knee steroid injection.  Patient states she received about 1.5 months of relief from steroid injections.  Patient states her pain started to return along bilateral knees and states all weightbearing activities aggravate his pain.  Patient states she is not currently doing any physical therapy or home exercises.  Patient states she does have knee braces at home but does not wear them.  Patient states she continues to take diclofenac as needed for  pain control.  Patient is interested in repeat steroid injections at today's visit as she has had symptomatic relief from this and she is not looking for any surgical intervention at this point time.    Review of Systems  ROS:    See HPI for musculoskeletal review.   All other systems reviewed are negative     History:  Past Medical History:   Diagnosis Date    Anemia     Aneurysm (HCC)     Anxiety     Arthritis     Depression     Headache(784.0)     Hypertension     Memory loss     Migraine     Morbid obesity with BMI of 45.0-49.9, adult (HCC)      Past Surgical History:   Procedure Laterality Date    BRAIN SURGERY      BREAST CYST EXCISION Right 2021    HYSTERECTOMY  2020    AUB; still has ovaries    IR CEREBRAL ANGIOGRAPHY  01/06/2023     Social History   Social History     Substance and Sexual Activity   Alcohol Use Never     Social History     Substance and Sexual Activity   Drug Use Never     Social History     Tobacco Use   Smoking Status Never   Smokeless Tobacco Never     Family History:   Family History   Problem Relation Age of Onset    Stroke Mother     Arthritis Mother     Migraines Sister     Aneurysm Sister     Hypertension Sister     No Known Problems Maternal Grandmother     No Known Problems Maternal Grandfather     No Known Problems Paternal Grandmother     No Known Problems Paternal Grandfather     Breast cancer Maternal Aunt         age unknown    Cancer Maternal Aunt         Breast cancer    Colon cancer Neg Hx     Ovarian cancer Neg Hx     Uterine cancer Neg Hx        Current Outpatient Medications on File Prior to Visit   Medication Sig Dispense Refill    amitriptyline (ELAVIL) 50 mg tablet Take 1 tablet (50 mg total) by mouth daily at bedtime 90 tablet 3    chlorthalidone 25 mg tablet Take 0.5 tablets (12.5 mg total) by mouth in the morning 180 tablet 1    Cholecalciferol (Vitamin D3) 50 MCG (2000 UT) capsule Take 1 capsule (2,000 Units total) by mouth every morning 90 capsule 3     "famotidine (PEPCID) 40 MG tablet Take 1 tablet (40 mg total) by mouth daily at bedtime 90 tablet 3    fluticasone (FLONASE) 50 mcg/act nasal spray 1 spray into each nostril daily 16 g 0    losartan (COZAAR) 100 MG tablet Take 1 tablet (100 mg total) by mouth in the morning 90 tablet 3    metFORMIN (GLUCOPHAGE) 500 mg tablet Take 1 tablet (500 mg total) by mouth 2 (two) times a day with meals (Patient not taking: Reported on 1/11/2024) 180 tablet 3    methylPREDNISolone 4 MG tablet therapy pack Use as directed on package (Patient not taking: Reported on 1/31/2024) 21 tablet 0    omeprazole (PriLOSEC) 40 MG capsule Take 1 capsule (40 mg total) by mouth daily before breakfast 90 capsule 3    potassium chloride (Klor-Con) 20 mEq packet Take 20 mEq by mouth daily (Patient not taking: Reported on 1/31/2024) 30 each 6    verapamil (VERELAN PM) 120 MG 24 hr capsule Take 1 capsule (120 mg total) by mouth in the morning 90 capsule 0     No current facility-administered medications on file prior to visit.     No Known Allergies     Objective     /93   Pulse 98   Ht 5' 5\" (1.651 m)   Wt 128 kg (282 lb)   LMP  (LMP Unknown)   BMI 46.93 kg/m²      PE:  AAOx 3  WDWN  Hearing intact, no drainage from eyes  no audible wheezing  no abdominal distension  LE compartments soft, skin intact    Ortho Exam:  bilateral Knee:   No erythema  no swelling  no effusion  no warmth  AROM: 0-95 bilaterally  Stable to varus/valgus stress  TTP medial and lateral joint line        Large joint arthrocentesis: bilateral knee  Universal Protocol:  Consent: Verbal consent obtained.  Risks and benefits: risks, benefits and alternatives were discussed  Consent given by: patient  Patient understanding: patient states understanding of the procedure being performed  Site marked: the operative site was marked  Patient identity confirmed: verbally with patient  Supporting Documentation  Indications: pain   Procedure Details  Location: knee - bilateral " knee  Needle size: 22 G  Ultrasound guidance: no  Approach: anterolateral    Medications (Right): 3 mL lidocaine 1 %; 2 mL methylPREDNISolone acetate 40 mg/mLMedications (Left): 3 mL lidocaine 1 %; 2 mL methylPREDNISolone acetate 40 mg/mL   Patient tolerance: patient tolerated the procedure well with no immediate complications  Dressing:  Sterile dressing applied        Scribe Attestation      I,:  Cayetano Gore am acting as a scribe while in the presence of the attending physician.:       I,:  Anamika Ramesh DO personally performed the services described in this documentation    as scribed in my presence.:

## 2024-02-05 ENCOUNTER — TELEPHONE (OUTPATIENT)
Dept: NEUROLOGY | Facility: CLINIC | Age: 49
End: 2024-02-05

## 2024-02-05 DIAGNOSIS — G43.109 MIGRAINE WITH AURA AND WITHOUT STATUS MIGRAINOSUS, NOT INTRACTABLE: ICD-10-CM

## 2024-02-05 RX ORDER — RIMEGEPANT SULFATE 75 MG/75MG
TABLET, ORALLY DISINTEGRATING ORAL
Qty: 16 TABLET | Refills: 0 | Status: CANCELLED | OUTPATIENT
Start: 2024-02-05

## 2024-02-05 RX ORDER — RIMEGEPANT SULFATE 75 MG/75MG
TABLET, ORALLY DISINTEGRATING ORAL
Qty: 16 TABLET | Refills: 6 | Status: SHIPPED | OUTPATIENT
Start: 2024-02-05

## 2024-02-08 ENCOUNTER — PATIENT OUTREACH (OUTPATIENT)
Dept: FAMILY MEDICINE CLINIC | Facility: CLINIC | Age: 49
End: 2024-02-08

## 2024-02-09 RX ORDER — METHYLPREDNISOLONE ACETATE 40 MG/ML
2 INJECTION, SUSPENSION INTRA-ARTICULAR; INTRALESIONAL; INTRAMUSCULAR; SOFT TISSUE
Status: COMPLETED | OUTPATIENT
Start: 2024-02-02 | End: 2024-02-02

## 2024-02-09 RX ORDER — LIDOCAINE HYDROCHLORIDE 10 MG/ML
3 INJECTION, SOLUTION INFILTRATION; PERINEURAL
Status: COMPLETED | OUTPATIENT
Start: 2024-02-02 | End: 2024-02-02

## 2024-02-09 NOTE — PROGRESS NOTES
Incmoing return call received from patient, per patient she did received the yris care application in the mail however she has decided not to persue this application at this time.     Patient stated she needs to submit to many supporting documents which she does not feel prepare to obtain at this time.    Advice patient if she changes her mind and needs assistance with yris care application to contact [facility].     No additional needs or concerns identify during call.    [facility] will close referral at this time due to patient decision not to pursue yris care and was previously denied state benefits due to being over the income limits.

## 2024-02-13 ENCOUNTER — PATIENT OUTREACH (OUTPATIENT)
Dept: FAMILY MEDICINE CLINIC | Facility: CLINIC | Age: 49
End: 2024-02-13

## 2024-02-13 NOTE — PROGRESS NOTES
OP CM rcvd inbasket from [facility] that pt was denied medicaid and liheap for being over income.  Pt is also not interested in assistance applying for yris care.  Case closed.

## 2024-02-20 ENCOUNTER — TELEPHONE (OUTPATIENT)
Dept: NEUROLOGY | Facility: CLINIC | Age: 49
End: 2024-02-20

## 2024-02-20 DIAGNOSIS — G43.109 MIGRAINE WITH AURA AND WITHOUT STATUS MIGRAINOSUS, NOT INTRACTABLE: Primary | ICD-10-CM

## 2024-02-20 RX ORDER — PREDNISONE 20 MG/1
TABLET ORAL DAILY
Qty: 12 TABLET | Refills: 0 | Status: SHIPPED | OUTPATIENT
Start: 2024-02-20 | End: 2024-02-26

## 2024-02-20 NOTE — TELEPHONE ENCOUNTER
Joanne MORAES    2/20/24  3:03 PM  Note     Patient called in to advise that the pharmacy instructed her to call Dr Qureshi in regards to signing off on the refills in order for get them it. She has been dealing with a migraine for several days now and can't the medication.      Please call to assist [phone].

## 2024-02-20 NOTE — TELEPHONE ENCOUNTER
Patient called in to advise that the pharmacy instructed her to call Dr Qureshi in regards to signing off on the refills in order for get them it. She has been dealing with a migraine for several days now and can't the medication.     Please call to assist [phone].

## 2024-02-20 NOTE — TELEPHONE ENCOUNTER
I have placed a script for Prednisone. If it is too expensive, I can send over a course of Decadron

## 2024-02-20 NOTE — TELEPHONE ENCOUNTER
Called pharm, Nurtec needs PA  [id number]  No pcn   [id number]  Id-[id number]  She was able to give me the key for CMM that they started  Key: [id number]     Nurtec PA attempted on CMM  Key: [id number] -exp scripts form  Received message-  BRIELLE does not manage PA for this patient. Please contact the number on the back of the members card for further assistance     Called pharm, made her that I have 2 PBM's listed in chart.  She ran eligibility check and was able to see other pbm info.  She tried to process thru [id number] and PA needed.  Help [phone]    New PA started on CMM-highmark form  Key: [id number]   PA completed   Urgent request  If [employer] has not replied to your request within 24-72 hours please contact [employer] at [phone].     Called pt and made her aware that PA was needed and submitted.   Migraine began late yesterday, today it was worse. Currently 5/10 but was a 10 earlier today.  Yesterday she was sweating like she was getting hot, but not today.  She does think that she ever tried decadron or depakote.  States that she does not have much money so would need something that is not that expensive.      Please advise  [phone] to leave detailed message or   Ok to send mychart message

## 2024-02-22 NOTE — TELEPHONE ENCOUNTER
nurte approved from 1/1/24-2/20/25    Left detailed message for pt making her aware of approval and asked that she contact pharm to have this filled.    [place] message sent to pt

## 2024-02-26 ENCOUNTER — NURSE TRIAGE (OUTPATIENT)
Dept: OBGYN CLINIC | Facility: CLINIC | Age: 49
End: 2024-02-26

## 2024-02-26 NOTE — TELEPHONE ENCOUNTER
Regarding: Bleeding issue she discussed with doctor in last visit  ----- Message from Anamika Singh sent at 2/22/2024  8:44 AM EST -----  Pt called wanting to speak with  about her bleeding. I tried to connect her to [facility] but informed her they were with pt's at the moment but I would send over a message to the office so a nurse can call and speak with her and gather some more information for the doctor.

## 2024-02-26 NOTE — TELEPHONE ENCOUNTER
Called pt and lvm to call back to get more information as to what was going on with her bleeding and wanting to speak to Zoey from 2/22.

## 2024-02-27 ENCOUNTER — TELEPHONE (OUTPATIENT)
Dept: OBGYN CLINIC | Facility: CLINIC | Age: 49
End: 2024-02-27

## 2024-03-07 ENCOUNTER — OFFICE VISIT (OUTPATIENT)
Dept: NEUROLOGY | Facility: CLINIC | Age: 49
End: 2024-03-07
Payer: COMMERCIAL

## 2024-03-07 VITALS
DIASTOLIC BLOOD PRESSURE: 84 MMHG | HEART RATE: 84 BPM | TEMPERATURE: 97.3 F | BODY MASS INDEX: 47.78 KG/M2 | OXYGEN SATURATION: 95 % | WEIGHT: 286.8 LBS | SYSTOLIC BLOOD PRESSURE: 130 MMHG | HEIGHT: 65 IN

## 2024-03-07 DIAGNOSIS — F32.A ANXIETY AND DEPRESSION: ICD-10-CM

## 2024-03-07 DIAGNOSIS — F41.9 ANXIETY AND DEPRESSION: ICD-10-CM

## 2024-03-07 DIAGNOSIS — G43.109 MIGRAINE WITH AURA AND WITHOUT STATUS MIGRAINOSUS, NOT INTRACTABLE: Primary | ICD-10-CM

## 2024-03-07 DIAGNOSIS — E66.01 MORBID OBESITY (HCC): ICD-10-CM

## 2024-03-07 DIAGNOSIS — R29.818 SUSPECTED SLEEP APNEA: ICD-10-CM

## 2024-03-07 DIAGNOSIS — G47.00 INSOMNIA, UNSPECIFIED TYPE: ICD-10-CM

## 2024-03-07 PROCEDURE — 99214 OFFICE O/P EST MOD 30 MIN: CPT | Performed by: STUDENT IN AN ORGANIZED HEALTH CARE EDUCATION/TRAINING PROGRAM

## 2024-03-07 RX ORDER — TOPIRAMATE 25 MG/1
TABLET ORAL
Qty: 120 TABLET | Refills: 6 | Status: SHIPPED | OUTPATIENT
Start: 2024-03-07

## 2024-03-07 NOTE — PROGRESS NOTES
[facility] Consult - Follow up   PATIENT:  Jamaica Arrington  MRN:  [id number]  :  1975  DATE OF SERVICE:  3/7/2024  REFERRED BY: No ref. provider found  PMD: Jamie Rizo PA-C    Assessment/Plan:   Jamaica Arrington is a delightful 47 y.o. female with a past medical history that includes hypertension, cerebral aneurysm, insomnia, morbid obesity here for f/u evaluation of headache.     At today's visit, she reports that her headaches have worsened.  She currently endorses a daily headache to some extent.  She primarily attributes this to multiple other ongoing stressors at this time including losing her job, poor quality sleep, and difficulties with mood.  I have recommended that we add on topiramate in addition to the amitriptyline as she finds that amitriptyline primarily only helps with her sleep.  With regards to abortive management, Nurtec helps, but she has been unable to get it at this time due to issues with her coupon card.  Until that system is back online I have recommended that we hold off on making any changes.  Should she have any issues with cost of that medication going forward, I have asked her to let me know so that I can involve our social work team.  She was interested in speaking with someone so I have placed a referral to psychology and provided her with a list of resources in the area.  She has not yet scheduled her sleep study so I have encouraged her to do so.    Workup:  - Neurologic assessment reveals unremarkable neurological exam  - With no new or concerning symptoms, no red flags and an unremarkable neurologic exam, there is no specific indication for further evaluation with MRI brain.  However, this could be obtained at any time if indicated.  - CTA head with and without contrast 10/25/2022: Postsurgical changes with left MCA aneurysm clip.  No acute findings.  Limited evaluation for residual/recurrent aneurysm. No stenosis.  - CTA head  with and without contrast 1/24/2024: No significant change from prior exam.  Status post left MCA aneurysm clipping  - Sleep study pending  - Psychology evaluation     Preventative:  - we discussed headache hygiene and lifestyle factors that may improve headaches  - Amitriptyline 50mg HS  - Add on Topamax with goal 50mg BID  - Currently on through other providers: Losartan, verapamil  - Past/ failed/contraindicated: None  - future options: SNRI, Topamax, CGRP med, botox     Acute:  - discussed not taking over-the-counter or prescription pain medications more than 2-3 days per week to prevent medication overuse/rebound headache  - Nurtec 75mg oral dissolving tablet  - Currently on through other providers: None  - Past/ failed/contraindicated: Ideally would like to avoid triptans due history of multiple intracranial aneurysms  - future options:  prochlorperazine, Toradol IM or p.o., could consider trial of 5 days of Depakote 500 mg nightly or dexamethasone 2 mg daily for prolonged migraine, ubrelvy, reyvow, nurtec  Patient instructions   Additional Testing  Sleep study     Referral  Psychology  - Behavioral Health:   [facility] [phone]  [facility]: [phone]  Dr. Brady Ayala: [phone]  [facility]: [phone]  [facility]: [phone]    Headache Calendar  Please maintain a headache calendar  Consider using phone applications such as Migraine Buddy or English Migraine Tracker     Headache/migraine treatment:   Acute medications (for immediate treatment of a headache):   It is ok to take ibuprofen, acetaminophen or naproxen (Advil, Tylenol,  Aleve, Excedrin) if they help your headaches you should limit these to No more than 2-3 times a week to avoid medication overuse/rebound headaches.      Prescription Abortive  Nurtec 75mg oral dissolving tablet as needed. Max 1 per  day     Prescription preventive medications for headaches/migraines   (to take every day to help prevent headaches - not to take at the time of headache):  Amitriptyline - increase to 50mg nightly  Topiramate 25 mg nightly for 1 week, then increase to 25 mg in a.m. And 25 mg in p.m. For 1 week, then take 25 mg in a.m. And 50 mg in p.m. For 1 week, then take 50 mg in a.m. and 50 mg in p.m. And continue  - generally the common side effects improve as your body gets used to the medication.  If we need to spread out a more gradual increase of the medication on a longer scale we can, just call if any questions or concerns  - if necessary, if the a.m. dose is causing side effects we can always have you take the full dose at night instead    - important to know per data, this medication may, but typically does not affect birth control unless you are taking 200 mg daily or more and I highly recommend being on birth control while on this medication due to possible significant detrimental effects to fetus if you were to get pregnant     The medication you are taking may impact pregnancy. It has been associated with birth defects and learning problems if taken during pregnancy. Thus, it is important to avoid unplanned pregnancy while taking this medication. In the future, if you plan to become pregnant, then you should discuss this with your neurologist since medication adjustments may be indicated.     Lifestyle Recommendations:  [x] SLEEP - Maintain a regular sleep schedule: Adults need at least 7-8 hours of uninterrupted a night. Maintain good sleep hygiene:  Going to bed and waking up at consistent times, avoiding excessive daytime naps, avoiding caffeinated beverages in the evening, avoid excessive stimulation in the evening and generally using bed primarily for sleeping.  One hour before bedtime would recommend turning lights down lower, decreasing your activity (may read quietly, listen to music at a low volume). When  you get into bed, should eliminate all technology (no texting, emailing, playing with your phone, iPad or tablet in bed).  [x] HYDRATION - Maintain good hydration.  Drink  2L of fluid a day (4 typical small water bottles)  [x] DIET - Maintain good nutrition. In particular don't skip meals and try and eat healthy balanced meals regularly.  [x] TRIGGERS - Look for other triggers and avoid them: Limit caffeine to 1-2 cups a day or less. Avoid dietary triggers that you have noticed bring on your headaches (this could include aged cheese, peanuts, MSG, aspartame and nitrates).  [x] EXERCISE - physical exercise as we all know is good for you in many ways, and not only is good for your heart, but also is beneficial for your mental health, cognitive health and  chronic pain/headaches. I would encourage at the least 5 days of physical exercise weekly for at least 30 minutes.      Education and Follow-up  [x] Please call with any questions or concerns. Of course if any new concerning symptoms go to the emergency department.  [x] Follow up in 4 months  Subjective:   3/7/24: At today's visit, she reports that her headaches have worsened.  She endorses daily headaches and is taking amitriptyline 50 mg at bedtime.  She is not able to take Nurtec at this time due to issues with cost and the inability to use a coupon card. Amitriptyline is helping with her sleep, but not so much with headache. She endorses a lot of other stressors in her life including losing her job. With regards to sleep, she gets about 7 hours per night. She has trouble staying asleep. Some days she does not feel refreshed.     Previous History:  6/6/23: Since her last visit, she feels that her headaches have worsened. Currently reports 10/30 headache days per month. She is taking Amitriptyline 30mg daily and reports that it is helpful, especially with sleep. She is unable to elaborate on why her headaches are worse. She has also been off of work as per her PCP  and is scheduled to be off of work until August.   1/12/23: Ms. Arrington reports a longstanding history of migraines.  She states that she previously used to follow with neurology at [facility], but we will need to obtain those records.  She has a history of anxiety and depression as well as insomnia and was previously started on amitriptyline for treatment of those issues as well as headaches.  She states that she feels it helped a little bit, but is currently on a low-dose of 20 mg nightly.  We decided that we would try increasing it to 30 mg and subsequently 40 mg to see if she has any better response.  From an abortive standpoint, she reports that Advil will take away the pain entirely in about 15 minutes.  Of note, she continues to follow with neurosurgery for known intracranial aneurysms, one of which was surgically clipped previously in 2018 at [facility].  In the future, we may need to consider obtaining a sleep study to rule out obstructive sleep apnea, which could certainly be contributing to a combination of her mood related issues and headaches.   Past Medical History:     Past Medical History:   Diagnosis Date    Anemia     Aneurysm ([facility])     Anxiety     Arthritis     Depression     Headache(784.0)     Hypertension     Memory loss     Migraine     Morbid obesity with BMI of 45.0-49.9, adult ([facility])        Patient Active Problem List   Diagnosis    Benign essential hypertension    Vitamin D deficiency    Other insomnia    Morbid obesity with BMI of 40.0-44.9, adult ([facility])    Cerebral aneurysm, nonruptured    Type 2 diabetes mellitus without complication, without long-term current use of insulin ([facility])    H. pylori infection    Gastroesophageal reflux disease without esophagitis    Other constipation    Hypokalemia    Chronic intractable headache    Migraine headache    Osteoarthritis    Mild intermittent asthma with acute exacerbation       Medications:      Current Outpatient Medications   Medication Sig  Dispense Refill    amitriptyline (ELAVIL) 50 mg tablet Take 1 tablet (50 mg total) by mouth daily at bedtime 90 tablet 3    chlorthalidone 25 mg tablet Take 0.5 tablets (12.5 mg total) by mouth in the morning 180 tablet 1    Cholecalciferol (Vitamin D3) 50 MCG (2000 UT) capsule Take 1 capsule (2,000 Units total) by mouth every morning 90 capsule 3    famotidine (PEPCID) 40 MG tablet Take 1 tablet (40 mg total) by mouth daily at bedtime 90 tablet 3    fluticasone (FLONASE) 50 mcg/act nasal spray 1 spray into each nostril daily 16 g 0    losartan (COZAAR) 100 MG tablet Take 1 tablet (100 mg total) by mouth in the morning 90 tablet 3    omeprazole (PriLOSEC) 40 MG capsule Take 1 capsule (40 mg total) by mouth daily before breakfast 90 capsule 3    verapamil (VERELAN PM) 120 MG 24 hr capsule Take 1 capsule (120 mg total) by mouth in the morning 90 capsule 0    metFORMIN (GLUCOPHAGE) 500 mg tablet Take 1 tablet (500 mg total) by mouth 2 (two) times a day with meals (Patient not taking: Reported on 1/11/2024) 180 tablet 3    methylPREDNISolone 4 MG tablet therapy pack Use as directed on package (Patient not taking: Reported on 1/31/2024) 21 tablet 0    potassium chloride (Klor-Con) 20 mEq packet Take 20 mEq by mouth daily (Patient not taking: Reported on 1/31/2024) 30 each 6    rimegepant sulfate (Nurtec) 75 mg TBDP TAKE ONE TABLET UNDER TONGUE AT ONSET. LIMIT 1 TABLET IN 24 HOURS (Patient not taking: Reported on 3/7/2024) 16 tablet 6     No current facility-administered medications for this visit.        Allergies:    No Known Allergies    Family History:     Family History   Problem Relation Age of Onset    Stroke Mother     Arthritis Mother     Migraines Sister     Aneurysm Sister     Hypertension Sister     No Known Problems Maternal Grandmother     No Known Problems Maternal Grandfather     No Known Problems Paternal Grandmother     No Known Problems Paternal Grandfather     Breast cancer Maternal Aunt         age  "unknown    Cancer Maternal Aunt         Breast cancer    Colon cancer Neg Hx     Ovarian cancer Neg Hx     Uterine cancer Neg Hx        Social History:     Social History     Socioeconomic History    Marital status:      Spouse name: Not on file    Number of children: Not on file    Years of education: Not on file    Highest education level: Not on file   Occupational History    Not on file   Tobacco Use    Smoking status: Never    Smokeless tobacco: Never   Vaping Use    Vaping status: Never Used   Substance and Sexual Activity    Alcohol use: Never    Drug use: Never    Sexual activity: Not Currently     Partners: Male     Birth control/protection: Surgical     Comment: last sexually active years ago   Other Topics Concern    Not on file   Social History Narrative    Not on file     Social Determinants of Health     Financial Resource Strain: Not on file   Food Insecurity: Not on file   Transportation Needs: Not on file   Physical Activity: Not on file   Stress: Not on file   Social Connections: Not on file   Intimate Partner Violence: Not on file   Housing Stability: Not on file         Objective:   Physical Exam:                                                               Vitals:            Constitutional:  /84 (BP Location: Left arm, Patient Position: Sitting, Cuff Size: Large)   Pulse 84   Temp (!) 97.3 °F (36.3 °C) (Temporal)   Ht 5' 5\" (1.651 m)   Wt 130 kg (286 lb 12.8 oz)   LMP  (LMP Unknown)   SpO2 95%   BMI 47.73 kg/m²   BP Readings from Last 3 Encounters:   03/07/24 130/84   02/02/24 146/93   01/31/24 140/98     Pulse Readings from Last 3 Encounters:   03/07/24 84   02/02/24 98   01/31/24 78         Well developed, well nourished, well groomed. No dysmorphic features.       HEENT:  Normocephalic atraumatic. See neuro exam   Chest:  Respirations appear regular and unlabored.    Cardiovascular:  no observed significant swelling.    Musculoskeletal:  (see below under neurologic exam " for evaluation of motor function and gait)   Skin:  warm and dry, not diaphoretic.    Psychiatric:  Normal behavior and appropriate affect       Neurological Examination:     Mental status/cognitive function:   Recent and remote memory intact. Attention span and concentration as well as fund of knowledge are appropriate for age. Normal language and spontaneous speech.  Cranial Nerves:  III, IV, VI-Pupils were equal, round. Extraocular movements were full and conjugate   VII-facial expression symmetric  VIII-hearing grossly intact bilaterally   Motor Exam: symmetric bulk throughout. no atrophy, fasciculations or abnormal movements noted.   Coordination:  no apparent dysmetria, ataxia or tremor noted  Gait: steady casual gait  Review of Systems:   Constitutional:  Negative for appetite change, fatigue and fever.   HENT: Negative.  Negative for hearing loss, tinnitus, trouble swallowing and voice change.    Eyes: Negative.  Negative for photophobia, pain and visual disturbance.   Respiratory: Negative.  Negative for shortness of breath.    Cardiovascular: Negative.  Negative for palpitations.   Gastrointestinal: Negative.  Negative for nausea and vomiting.   Endocrine: Negative.  Negative for cold intolerance.   Genitourinary: Negative.  Negative for dysuria, frequency and urgency.   Musculoskeletal:  Negative for back pain, gait problem, myalgias, neck pain and neck stiffness.   Skin: Negative.  Negative for rash.   Allergic/Immunologic: Negative.    Neurological:  Positive for headaches. Negative for dizziness, tremors, seizures, syncope, facial asymmetry, speech difficulty, weakness, light-headedness and numbness.   Hematological: Negative.  Does not bruise/bleed easily.   Psychiatric/Behavioral: Negative.  Negative for confusion, hallucinations and sleep disturbance.    All other systems reviewed and are negative.    I have spent 15 minutes with Patient  today in which greater than 50% of this time was spent in  counseling/coordination of care regarding Prognosis, Risks and benefits of tx options, Patient and family education, Importance of tx compliance, Impressions, Documenting in the medical record, Reviewing / ordering tests, medicine, procedures  , and Obtaining or reviewing history  . I also spent 15 minutes non face to face for this patient the same day.     Activity Minutes   Precharting/reviewing 10   Patient care/counseling 15   Postcharting/care coordination 5       Author:  Alexander Qureshi DO 3/7/2024 9:10 AM

## 2024-03-07 NOTE — PROGRESS NOTES
Review of Systems   Constitutional:  Negative for appetite change, fatigue and fever.   HENT: Negative.  Negative for hearing loss, tinnitus, trouble swallowing and voice change.    Eyes: Negative.  Negative for photophobia, pain and visual disturbance.   Respiratory: Negative.  Negative for shortness of breath.    Cardiovascular: Negative.  Negative for palpitations.   Gastrointestinal: Negative.  Negative for nausea and vomiting.   Endocrine: Negative.  Negative for cold intolerance.   Genitourinary: Negative.  Negative for dysuria, frequency and urgency.   Musculoskeletal:  Negative for back pain, gait problem, myalgias, neck pain and neck stiffness.   Skin: Negative.  Negative for rash.   Allergic/Immunologic: Negative.    Neurological:  Positive for headaches. Negative for dizziness, tremors, seizures, syncope, facial asymmetry, speech difficulty, weakness, light-headedness and numbness.   Hematological: Negative.  Does not bruise/bleed easily.   Psychiatric/Behavioral: Negative.  Negative for confusion, hallucinations and sleep disturbance.    All other systems reviewed and are negative.    Since your last visit are your headaches Worsened    Any change to the headache type? no    What is your current headache frequency: 1 per day, continuous     Are you taking your current medications as prescribed? No    If no, why not: ran out of Nurtec    Do you have any side effects? no    How may days per week do you take an abortive medicine? 0/7

## 2024-03-07 NOTE — PATIENT INSTRUCTIONS
Additional Testing  Sleep study     Referral  Psychology  - Behavioral Health:   [facility], [place]: [phone]  [facility]: [phone]  Dr. Brady Ayala: [phone]  [facility]: [phone]  [facility]: [phone]    Headache Calendar  Please maintain a headache calendar  Consider using phone applications such as Migraine Buddy or [employer] Migraine Tracker     Headache/migraine treatment:   Acute medications (for immediate treatment of a headache):   It is ok to take ibuprofen, acetaminophen or naproxen (Advil, Tylenol,  Aleve, Excedrin) if they help your headaches you should limit these to No more than 2-3 times a week to avoid medication overuse/rebound headaches.      Prescription Abortive  Nurtec 75mg oral dissolving tablet as needed. Max 1 per day     Prescription preventive medications for headaches/migraines   (to take every day to help prevent headaches - not to take at the time of headache):  Amitriptyline - increase to 50mg nightly  Topiramate 25 mg nightly for 1 week, then increase to 25 mg in a.m. And 25 mg in p.m. For 1 week, then take 25 mg in a.m. And 50 mg in p.m. For 1 week, then take 50 mg in a.m. and 50 mg in p.m. And continue  - generally the common side effects improve as your body gets used to the medication.  If we need to spread out a more gradual increase of the medication on a longer scale we can, just call if any questions or concerns  - if necessary, if the a.m. dose is causing side effects we can always have you take the full dose at night instead    - important to know per data, this medication may, but typically does not affect birth control unless you are taking 200 mg daily or more and I highly recommend being on birth control while on this medication due to possible significant detrimental effects to fetus if you were to get pregnant     The  medication you are taking may impact pregnancy. It has been associated with birth defects and learning problems if taken during pregnancy. Thus, it is important to avoid unplanned pregnancy while taking this medication. In the future, if you plan to become pregnant, then you should discuss this with your neurologist since medication adjustments may be indicated.     Lifestyle Recommendations:  [x] SLEEP - Maintain a regular sleep schedule: Adults need at least 7-8 hours of uninterrupted a night. Maintain good sleep hygiene:  Going to bed and waking up at consistent times, avoiding excessive daytime naps, avoiding caffeinated beverages in the evening, avoid excessive stimulation in the evening and generally using bed primarily for sleeping.  One hour before bedtime would recommend turning lights down lower, decreasing your activity (may read quietly, listen to music at a low volume). When you get into bed, should eliminate all technology (no texting, emailing, playing with your phone, iPad or tablet in bed).  [x] HYDRATION - Maintain good hydration.  Drink  2L of fluid a day (4 typical small water bottles)  [x] DIET - Maintain good nutrition. In particular don't skip meals and try and eat healthy balanced meals regularly.  [x] TRIGGERS - Look for other triggers and avoid them: Limit caffeine to 1-2 cups a day or less. Avoid dietary triggers that you have noticed bring on your headaches (this could include aged cheese, peanuts, MSG, aspartame and nitrates).  [x] EXERCISE - physical exercise as we all know is good for you in many ways, and not only is good for your heart, but also is beneficial for your mental health, cognitive health and  chronic pain/headaches. I would encourage at the least 5 days of physical exercise weekly for at least 30 minutes.      Education and Follow-up  [x] Please call with any questions or concerns. Of course if any new concerning symptoms go to the emergency department.  [x] Follow up in  4 months

## 2024-03-29 ENCOUNTER — TELEPHONE (OUTPATIENT)
Dept: PSYCHIATRY | Facility: CLINIC | Age: 49
End: 2024-03-29

## 2024-04-03 DIAGNOSIS — G47.00 INSOMNIA, UNSPECIFIED TYPE: ICD-10-CM

## 2024-04-03 DIAGNOSIS — G43.109 MIGRAINE WITH AURA AND WITHOUT STATUS MIGRAINOSUS, NOT INTRACTABLE: ICD-10-CM

## 2024-04-03 DIAGNOSIS — I10 BENIGN ESSENTIAL HYPERTENSION: ICD-10-CM

## 2024-04-03 PROCEDURE — 4010F ACE/ARB THERAPY RXD/TAKEN: CPT | Performed by: ORTHOPAEDIC SURGERY

## 2024-04-03 RX ORDER — LOSARTAN POTASSIUM 100 MG/1
100 TABLET ORAL DAILY
Qty: 90 TABLET | Refills: 1 | Status: SHIPPED | OUTPATIENT
Start: 2024-04-03

## 2024-04-04 RX ORDER — AMITRIPTYLINE HYDROCHLORIDE 50 MG/1
50 TABLET, FILM COATED ORAL
Qty: 90 TABLET | Refills: 3 | Status: SHIPPED | OUTPATIENT
Start: 2024-04-04

## 2024-04-05 ENCOUNTER — OFFICE VISIT (OUTPATIENT)
Dept: GASTROENTEROLOGY | Facility: MEDICAL CENTER | Age: 49
End: 2024-04-05
Payer: COMMERCIAL

## 2024-04-05 VITALS
WEIGHT: 288.8 LBS | BODY MASS INDEX: 48.06 KG/M2 | SYSTOLIC BLOOD PRESSURE: 114 MMHG | HEART RATE: 103 BPM | DIASTOLIC BLOOD PRESSURE: 82 MMHG | TEMPERATURE: 98.4 F

## 2024-04-05 DIAGNOSIS — K21.9 GASTROESOPHAGEAL REFLUX DISEASE WITHOUT ESOPHAGITIS: ICD-10-CM

## 2024-04-05 DIAGNOSIS — R11.2 NAUSEA AND VOMITING, UNSPECIFIED VOMITING TYPE: Primary | ICD-10-CM

## 2024-04-05 PROCEDURE — 99213 OFFICE O/P EST LOW 20 MIN: CPT | Performed by: NURSE PRACTITIONER

## 2024-04-05 NOTE — PROGRESS NOTES
"[facility] - Outpatient Follow-up Note  Jamaica Arrington 48 y.o. female MRN: [id number]  Encounter: [id number]          ASSESSMENT AND PLAN:      1.  Gastroesophageal reflux disease without esophagitis  2.  History of H. pylori infection  3.  Nausea/vomiting    She has history of chronic reflux. She underwent EGD showing small hiatal hernia in February 2023. She has continued to take omeprazole 40 mg daily but continues to have breakthrough symptoms.  Famotidine 40 mg nightly was added but patient is still having almost nightly bouts of regurgitation that awakens her at night.  Reports she does eat sometimes within 2 to 3 hours before she goes to bed but eats a small amount of popcorn.  She does have a history of type 2 diabetes.  We did discuss setting up a gastric emptying study to assess for gastroparesis.  She reports she feels \"good\" during the day.  Symptoms usually occur overnight.  Occasionally she has nausea intermittently throughout the day but cannot pinpoint triggers.  Rare vomiting.  She has gained approximately 30 pounds since last visit.  She does have a history of H. pylori 2/23 but was treated with antibiotic therapy and stool testing confirmed eradication.    -Antireflux diet.  Try not to eat within 2 to 3 hours before going to sleep  -Wedge pillow or put bricks under head of bed  -Continue omeprazole 40 mg daily and famotidine 40 mg nightly  -Antacids as needed  -Gastric emptying study  -Follow-up in office in 3 to 4 months  -I will contact patient with results of gastric emptying study    ______________________________________________________________________    SUBJECTIVE: 48-year-old female here for follow-up.  She was last seen by Dr. Jaiyeola 6/15/2023 for GERD and history of H. pylori infection.    Longstanding history of acid reflux.  Last EGD was 2/23 which noted a small hiatal hernia.  Biopsies were positive for H. pylori.  Patient was treated and subsequent " stool for H. pylori confirmed eradication.  She is taking omeprazole 40 mg in the morning and famotidine 40 mg nightly.  Reports she has breakthrough reflux that can awaken her at night.  She has regurgitation and occasionally it comes out through her nose.  Occasionally she does eat within 2 to 3 hours before she goes to bed but only eats a small amount of popcorn.  She does have history of type 2 diabetes.  BMs are brown and formed daily.  Denies any melena or hematochezia.        Prior EGD/colonoscopy     EGD 2/23-irregular Z-line, small hiatal hernia.  Gastric biopsies were positive for H. pylori and she was started on antibiotic treatment.  Subsequent stool antigen testing was negative.    Colonoscopy 2/23-2 subcentimeter colon polyps, pancolonic diverticulosis and small hemorrhoids otherwise unremarkable.  Biopsies noted sessile serrated adenoma and repeat colonoscopy recommended in 5 years.    REVIEW OF SYSTEMS IS OTHERWISE NEGATIVE.  10 point review of systems negative other than per HPI    Historical Information   Past Medical History:   Diagnosis Date   • Anemia    • Aneurysm (HCC)    • Anxiety    • Arthritis    • Depression    • Headache(784.0)    • Hypertension    • Memory loss    • Migraine    • Morbid obesity with BMI of 45.0-49.9, adult (HCC)      Past Surgical History:   Procedure Laterality Date   • BRAIN SURGERY     • BREAST CYST EXCISION Right 2021   • HYSTERECTOMY  2020    AUB; still has ovaries   • IR CEREBRAL ANGIOGRAPHY  01/06/2023     Social History   Social History     Substance and Sexual Activity   Alcohol Use Never     Social History     Substance and Sexual Activity   Drug Use Never     Social History     Tobacco Use   Smoking Status Never   Smokeless Tobacco Never     Family History   Problem Relation Age of Onset   • Stroke Mother    • Arthritis Mother    • Migraines Sister    • Aneurysm Sister    • Hypertension Sister    • No Known Problems Maternal Grandmother    • No Known Problems  Maternal Grandfather    • No Known Problems Paternal Grandmother    • No Known Problems Paternal Grandfather    • Breast cancer Maternal Aunt         age unknown   • Cancer Maternal Aunt         Breast cancer   • Colon cancer Neg Hx    • Ovarian cancer Neg Hx    • Uterine cancer Neg Hx        Meds/Allergies       Current Outpatient Medications:   •  amitriptyline (ELAVIL) 50 mg tablet  •  chlorthalidone 25 mg tablet  •  Cholecalciferol (Vitamin D3) 50 MCG (2000 UT) capsule  •  famotidine (PEPCID) 40 MG tablet  •  fluticasone (FLONASE) 50 mcg/act nasal spray  •  losartan (COZAAR) 100 MG tablet  •  omeprazole (PriLOSEC) 40 MG capsule  •  verapamil (VERELAN PM) 120 MG 24 hr capsule  •  metFORMIN (GLUCOPHAGE) 500 mg tablet  •  methylPREDNISolone 4 MG tablet therapy pack  •  potassium chloride (Klor-Con) 20 mEq packet  •  rimegepant sulfate (Nurtec) 75 mg TBDP  •  topiramate (TOPAMAX) 25 mg tablet    No Known Allergies        Objective     Blood pressure 114/82, pulse 103, temperature 98.4 °F (36.9 °C), weight 131 kg (288 lb 12.8 oz). Body mass index is 48.06 kg/m².      PHYSICAL EXAM:      General Appearance:   Alert, cooperative, no distress   HEENT:   Normocephalic, atraumatic, anicteric.     Neck:  Supple, symmetrical, trachea midline   Lungs:   Clear to auscultation bilaterally; no rales, rhonchi or wheezing; respirations unlabored    Heart::   Regular rate and rhythm; no murmur, rub, or gallop.   Abdomen:   Soft, non-tender, non-distended; normal bowel sounds; no masses, no organomegaly    Genitalia:   Deferred    Rectal:   Deferred    Extremities:  No cyanosis, clubbing or edema    Pulses:  2+ and symmetric    Skin:  No jaundice, rashes, or lesions    Lymph nodes:  No palpable cervical lymphadenopathy        Lab Results:   No visits with results within 1 Day(s) from this visit.   Latest known visit with results is:   Admission on 01/09/2024, Discharged on 01/09/2024   Component Date Value   • WBC 01/09/2024 7.62     • RBC 01/09/2024 5.29 (H)    • Hemoglobin 01/09/2024 12.1    • Hematocrit 01/09/2024 39.3    • MCV 01/09/2024 74 (L)    • MCH 01/09/2024 22.9 (L)    • MCHC 01/09/2024 30.8 (L)    • RDW 01/09/2024 16.7 (H)    • MPV 01/09/2024 10.3    • Platelets 01/09/2024 350    • nRBC 01/09/2024 0    • Neutrophils Relative 01/09/2024 55    • Immature Grans % 01/09/2024 0    • Lymphocytes Relative 01/09/2024 33    • Monocytes Relative 01/09/2024 8    • Eosinophils Relative 01/09/2024 3    • Basophils Relative 01/09/2024 1    • Neutrophils Absolute 01/09/2024 4.20    • Absolute Immature Grans 01/09/2024 0.02    • Absolute Lymphocytes 01/09/2024 2.53    • Absolute Monocytes 01/09/2024 0.60    • Eosinophils Absolute 01/09/2024 0.23    • Basophils Absolute 01/09/2024 0.04    • Sodium 01/09/2024 141    • Potassium 01/09/2024 3.4 (L)    • Chloride 01/09/2024 102    • CO2 01/09/2024 31    • ANION GAP 01/09/2024 8    • BUN 01/09/2024 11    • Creatinine 01/09/2024 0.70    • Glucose 01/09/2024 114    • Calcium 01/09/2024 9.1    • AST 01/09/2024 18    • ALT 01/09/2024 15    • Alkaline Phosphatase 01/09/2024 78    • Total Protein 01/09/2024 7.3    • Albumin 01/09/2024 4.0    • Total Bilirubin 01/09/2024 0.37    • eGFR 01/09/2024 103    • STREP A PCR 01/09/2024 Not Detected    • SARS-CoV-2 01/09/2024 Negative    • INFLUENZA A PCR 01/09/2024 Negative    • INFLUENZA B PCR 01/09/2024 Negative    • RSV PCR 01/09/2024 Negative    • Lipase 01/09/2024 22    • hs TnI 0hr 01/09/2024 5    • Ventricular Rate 01/09/2024 99    • Atrial Rate 01/09/2024 99    • [place] Interval 01/09/2024 148    • QRSD Interval 01/09/2024 70    • QT Interval 01/09/2024 308    • QTC Interval 01/09/2024 395    • P Axis 01/09/2024 74    • QRS Axis 01/09/2024 50    • T Wave Axis 01/09/2024 0    • Color, UA 01/09/2024 Yellow    • Clarity, UA 01/09/2024 Cloudy    • pH, UA 01/09/2024 6.0    • Leukocytes, UA 01/09/2024 Negative    • Nitrite, UA 01/09/2024 Negative    • Protein, UA  01/09/2024 100 (2+) (A)    • Glucose, UA 01/09/2024 Negative    • Ketones, UA 01/09/2024 Negative    • Urobilinogen, UA 01/09/2024 0.2    • Bilirubin, UA 01/09/2024 Negative    • Occult Blood, UA 01/09/2024 Negative    • Specific Gravity, UA 01/09/2024 >=1.030    • RBC, UA 01/09/2024 1-2    • WBC, UA 01/09/2024 1-2    • Epithelial Cells 01/09/2024 Occasional    • Bacteria, UA 01/09/2024 Occasional    • MUCUS THREADS 01/09/2024 Moderate (A)    • hs TnI 2hr 01/09/2024 6    • Delta 2hr hsTnI 01/09/2024 1    • Ventricular Rate 01/09/2024 91    • Atrial Rate 01/09/2024 91    • CT Interval 01/09/2024 160    • QRSD Interval 01/09/2024 70    • QT Interval 01/09/2024 394    • QTC Interval 01/09/2024 484    • P Axis 01/09/2024 61    • QRS Axis 01/09/2024 58    • T Wave Axis 01/09/2024 17          Radiology Results:   No results found.

## 2024-04-08 ENCOUNTER — OFFICE VISIT (OUTPATIENT)
Dept: FAMILY MEDICINE CLINIC | Facility: CLINIC | Age: 49
End: 2024-04-08
Payer: COMMERCIAL

## 2024-04-08 ENCOUNTER — TELEPHONE (OUTPATIENT)
Age: 49
End: 2024-04-08

## 2024-04-08 VITALS
SYSTOLIC BLOOD PRESSURE: 108 MMHG | WEIGHT: 290 LBS | HEIGHT: 65 IN | DIASTOLIC BLOOD PRESSURE: 68 MMHG | HEART RATE: 98 BPM | OXYGEN SATURATION: 99 % | BODY MASS INDEX: 48.32 KG/M2

## 2024-04-08 DIAGNOSIS — N39.0 URINARY TRACT INFECTION WITHOUT HEMATURIA, SITE UNSPECIFIED: ICD-10-CM

## 2024-04-08 DIAGNOSIS — M54.50 ACUTE BILATERAL LOW BACK PAIN WITHOUT SCIATICA: ICD-10-CM

## 2024-04-08 DIAGNOSIS — E11.9 TYPE 2 DIABETES MELLITUS WITHOUT COMPLICATION, WITHOUT LONG-TERM CURRENT USE OF INSULIN (HCC): ICD-10-CM

## 2024-04-08 DIAGNOSIS — R20.8 BURNING SENSATION: Primary | ICD-10-CM

## 2024-04-08 LAB
LEFT EYE DIABETIC RETINOPATHY: NORMAL
LEFT EYE IMAGE QUALITY: NORMAL
LEFT EYE MACULAR EDEMA: NORMAL
LEFT EYE OTHER RETINOPATHY: NORMAL
RIGHT EYE DIABETIC RETINOPATHY: NORMAL
RIGHT EYE IMAGE QUALITY: NORMAL
RIGHT EYE MACULAR EDEMA: NORMAL
RIGHT EYE OTHER RETINOPATHY: NORMAL
SEVERITY (EYE EXAM): NORMAL
SL AMB  POCT GLUCOSE, UA: NEGATIVE
SL AMB LEUKOCYTE ESTERASE,UA: NEGATIVE
SL AMB POCT BILIRUBIN,UA: NEGATIVE
SL AMB POCT BLOOD,UA: NEGATIVE
SL AMB POCT CLARITY,UA: ABNORMAL
SL AMB POCT COLOR,UA: YELLOW
SL AMB POCT KETONES,UA: NEGATIVE
SL AMB POCT NITRITE,UA: NEGATIVE
SL AMB POCT PH,UA: 7
SL AMB POCT SPECIFIC GRAVITY,UA: 1.02
SL AMB POCT URINE PROTEIN: 30
SL AMB POCT UROBILINOGEN: 0.2

## 2024-04-08 PROCEDURE — 99214 OFFICE O/P EST MOD 30 MIN: CPT | Performed by: PHYSICIAN ASSISTANT

## 2024-04-08 PROCEDURE — 81002 URINALYSIS NONAUTO W/O SCOPE: CPT | Performed by: PHYSICIAN ASSISTANT

## 2024-04-08 PROCEDURE — 3061F NEG MICROALBUMINURIA REV: CPT | Performed by: ORTHOPAEDIC SURGERY

## 2024-04-08 PROCEDURE — 87086 URINE CULTURE/COLONY COUNT: CPT | Performed by: PHYSICIAN ASSISTANT

## 2024-04-08 PROCEDURE — 87147 CULTURE TYPE IMMUNOLOGIC: CPT | Performed by: PHYSICIAN ASSISTANT

## 2024-04-08 PROCEDURE — 92250 FUNDUS PHOTOGRAPHY W/I&R: CPT | Performed by: PHYSICIAN ASSISTANT

## 2024-04-08 RX ORDER — CIPROFLOXACIN 500 MG/1
500 TABLET, FILM COATED ORAL EVERY 12 HOURS SCHEDULED
Qty: 14 TABLET | Refills: 0 | Status: SHIPPED | OUTPATIENT
Start: 2024-04-08 | End: 2024-04-15

## 2024-04-08 RX ORDER — TIZANIDINE 4 MG/1
4 TABLET ORAL
Qty: 30 TABLET | Refills: 0 | Status: SHIPPED | OUTPATIENT
Start: 2024-04-08

## 2024-04-08 NOTE — PROGRESS NOTES
Name: Jamaica Arrington      : 1975      MRN: [id number]  Encounter Provider: Jamie Rizo PA-C  Encounter Date: 2024   Encounter department: [facility]    Assessment & Plan     Patient Instructions   Assessment/plan:  1.  Urinary tract infection-patient does have some cloudiness of the urine with protein present.  Recommend sending for culture and sensitivity.  She will be started on Cipro 500 mg twice daily for 7 days.  Recommend follow-up if she has any worsening symptoms or fevers present.  2.  Lumbago-this does seem to be lower than her kidneys.  She does not have any CVA tenderness.  Would recommend starting on tizanidine 4 mg in the evening as needed for muscle spasm.  3.  Type 2 diabetes-presently stable.  Patient is due for follow-up diabetic labs.  Diabetic eye exam performed today.    1. Burning sensation  -     POCT urine dip    2. Type 2 diabetes mellitus without complication, without long-term current use of insulin (HCC)  -     IRIS Diabetic eye exam    3. Urinary tract infection without hematuria, site unspecified  -     ciprofloxacin (CIPRO) 500 mg tablet; Take 1 tablet (500 mg total) by mouth every 12 (twelve) hours for 7 days  -     Urine culture; Future    4. Acute bilateral low back pain without sciatica  -     tiZANidine (ZANAFLEX) 4 mg tablet; Take 1 tablet (4 mg total) by mouth daily at bedtime        Depression Screening and Follow-up Plan: Patient was screened for depression during today's encounter. They screened negative with a PHQ-2 score of 0.        Subjective      This is a 48-year-old female who presents to the office with a history of type 2 diabetes and concerns over her back pain that started about 2 weeks ago.  She does not recall any injury to the area.  She also noticed that she started to have some burning upon urination around the same time.  That has been persistent and has not gotten better.  She thought she better get checked.  She has  occasionally had some chills but has not had any fevers present.      Review of Systems   Constitutional:  Negative for chills, fatigue and fever.   HENT:  Negative for congestion, ear pain and sinus pressure.    Eyes:  Negative for visual disturbance.   Respiratory:  Negative for cough, chest tightness and shortness of breath.    Cardiovascular:  Negative for chest pain and palpitations.   Gastrointestinal:  Negative for diarrhea, nausea and vomiting.   Endocrine: Negative for polyuria.   Genitourinary:  Negative for dysuria and frequency.   Musculoskeletal:  Positive for arthralgias, back pain and myalgias.   Skin:  Negative for pallor and rash.   Neurological:  Negative for dizziness, weakness, light-headedness, numbness and headaches.   Psychiatric/Behavioral:  Negative for agitation, behavioral problems and sleep disturbance.    All other systems reviewed and are negative.      Current Outpatient Medications on File Prior to Visit   Medication Sig   • amitriptyline (ELAVIL) 50 mg tablet Take 1 tablet (50 mg total) by mouth daily at bedtime   • chlorthalidone 25 mg tablet Take 0.5 tablets (12.5 mg total) by mouth in the morning   • Cholecalciferol (Vitamin D3) 50 MCG (2000 UT) capsule Take 1 capsule (2,000 Units total) by mouth every morning   • famotidine (PEPCID) 40 MG tablet Take 1 tablet (40 mg total) by mouth daily at bedtime   • fluticasone (FLONASE) 50 mcg/act nasal spray 1 spray into each nostril daily   • losartan (COZAAR) 100 MG tablet Take 1 tablet (100 mg total) by mouth in the morning   • omeprazole (PriLOSEC) 40 MG capsule Take 1 capsule (40 mg total) by mouth daily before breakfast   • verapamil (VERELAN PM) 120 MG 24 hr capsule Take 1 capsule (120 mg total) by mouth in the morning   • [DISCONTINUED] metFORMIN (GLUCOPHAGE) 500 mg tablet Take 1 tablet (500 mg total) by mouth 2 (two) times a day with meals (Patient not taking: Reported on 1/11/2024)   • [DISCONTINUED] methylPREDNISolone 4 MG tablet  "therapy pack Use as directed on package (Patient not taking: Reported on 1/31/2024)   • [DISCONTINUED] potassium chloride (Klor-Con) 20 mEq packet Take 20 mEq by mouth daily (Patient not taking: Reported on 1/31/2024)   • [DISCONTINUED] rimegepant sulfate (Nurtec) 75 mg TBDP TAKE ONE TABLET UNDER TONGUE AT ONSET. LIMIT 1 TABLET IN 24 HOURS (Patient not taking: Reported on 3/7/2024)   • [DISCONTINUED] topiramate (TOPAMAX) 25 mg tablet 1 tab PO QHS for 1 week, increase as tolerated to 1 tab BID for 1 week, then 1 tab QAM and 2 tabs QHS for 1 week and finish at 2 tabs BID. (Patient not taking: Reported on 4/5/2024)       Objective     /68 (BP Location: Left arm, Patient Position: Sitting, Cuff Size: Standard)   Pulse 98   Ht 5' 5\" (1.651 m)   Wt 132 kg (290 lb)   LMP  (LMP Unknown)   SpO2 99%   BMI 48.26 kg/m²     Physical Exam  Vitals and nursing note reviewed.   Constitutional:       General: She is not in acute distress.     Appearance: She is well-developed.   HENT:      Head: Normocephalic and atraumatic.      Right Ear: External ear normal.      Left Ear: External ear normal.      Nose: Nose normal.      Mouth/Throat:      Pharynx: No oropharyngeal exudate.   Eyes:      Conjunctiva/sclera: Conjunctivae normal.      Pupils: Pupils are equal, round, and reactive to light.   Neck:      Thyroid: No thyromegaly.      Trachea: No tracheal deviation.   Cardiovascular:      Rate and Rhythm: Normal rate and regular rhythm.      Pulses: no weak pulses.           Dorsalis pedis pulses are 2+ on the right side and 2+ on the left side.        Posterior tibial pulses are 2+ on the right side and 2+ on the left side.      Heart sounds: Normal heart sounds. No murmur heard.     No friction rub.   Pulmonary:      Effort: Pulmonary effort is normal. No respiratory distress.      Breath sounds: Normal breath sounds. No wheezing or rales.   Abdominal:      General: Bowel sounds are normal. There is no distension.      " Palpations: Abdomen is soft.      Tenderness: There is no abdominal tenderness. There is no right CVA tenderness, left CVA tenderness, guarding or rebound.   Musculoskeletal:         General: No tenderness. Normal range of motion.      Cervical back: Normal range of motion and neck supple.   Feet:      Right foot:      Skin integrity: No ulcer, skin breakdown, erythema, warmth, callus or dry skin.      Left foot:      Skin integrity: No ulcer, skin breakdown, erythema, warmth, callus or dry skin.   Lymphadenopathy:      Cervical: No cervical adenopathy.   Skin:     General: Skin is warm and dry.      Findings: No erythema or rash.   Neurological:      Mental Status: She is alert and oriented to person, place, and time.      Cranial Nerves: No cranial nerve deficit.      Coordination: Coordination normal.   Psychiatric:         Behavior: Behavior normal.         Thought Content: Thought content normal.     Patient's shoes and socks removed.    Right Foot/Ankle   Right Foot Inspection  Skin Exam: skin normal and skin intact. No dry skin, no warmth, no callus, no erythema, no maceration, no abnormal color, no pre-ulcer, no ulcer and no callus.     Toe Exam: ROM and strength within normal limits.     Sensory   Vibration: intact  Proprioception: intact  Monofilament testing: intact    Vascular  Capillary refills: < 3 seconds  The right DP pulse is 2+. The right PT pulse is 2+.     Right Toe  - Comprehensive Exam  Ecchymosis: none  Arch: normal  Hammertoes: absent  Claw Toes: absent  Swelling: none   Tenderness: none       Left Foot/Ankle  Left Foot Inspection  Skin Exam: skin normal and skin intact. No dry skin, no warmth, no erythema, no maceration, normal color, no pre-ulcer, no ulcer and no callus.     Toe Exam: ROM and strength within normal limits.     Sensory   Vibration: intact  Proprioception: intact  Monofilament testing: intact    Vascular  Capillary refills: < 3 seconds  The left DP pulse is 2+. The left PT  pulse is 2+.     Left Toe  - Comprehensive Exam  Ecchymosis: none  Arch: normal  Hammertoes: absent  Claw toes: absent  Swelling: none   Tenderness: none       Assign Risk Category  No deformity present  No loss of protective sensation  No weak pulses  Risk: 0     Jamie Rizo PA-C

## 2024-04-08 NOTE — PATIENT INSTRUCTIONS
Assessment/plan:  1.  Urinary tract infection-patient does have some cloudiness of the urine with protein present.  Recommend sending for culture and sensitivity.  She will be started on Cipro 500 mg twice daily for 7 days.  Recommend follow-up if she has any worsening symptoms or fevers present.  2.  Lumbago-this does seem to be lower than her kidneys.  She does not have any CVA tenderness.  Would recommend starting on tizanidine 4 mg in the evening as needed for muscle spasm.  3.  Type 2 diabetes-presently stable.  Patient is due for follow-up diabetic labs.  Diabetic eye exam performed today.

## 2024-04-08 NOTE — TELEPHONE ENCOUNTER
Maru pharmacist from [facility] in [place] called in regarding prescriptions. States that there is a X contraindication/warning with prescribing cipro and zanaflex simultaneously. Please advise. Maru can be called back at [phone].

## 2024-04-09 ENCOUNTER — TELEPHONE (OUTPATIENT)
Dept: FAMILY MEDICINE CLINIC | Facility: CLINIC | Age: 49
End: 2024-04-09

## 2024-04-09 ENCOUNTER — TELEPHONE (OUTPATIENT)
Age: 49
End: 2024-04-09

## 2024-04-09 LAB
BACTERIA UR CULT: ABNORMAL
BACTERIA UR CULT: ABNORMAL

## 2024-04-09 NOTE — TELEPHONE ENCOUNTER
Message received from pharmacy that Cipro may increase levels of tizanidine.  Would recommend patient only use half tablet of Tizanidine at bedtime as necessary if she needs at all while she is on the Cipro therapy.

## 2024-04-09 NOTE — TELEPHONE ENCOUNTER
Pharmacy calling to notify dr that zanaflex and cipro interact. Wanting to make sure its ok to fill them.

## 2024-04-10 ENCOUNTER — TELEPHONE (OUTPATIENT)
Dept: FAMILY MEDICINE CLINIC | Facility: CLINIC | Age: 49
End: 2024-04-10

## 2024-04-10 NOTE — TELEPHONE ENCOUNTER
----- Message from Jamie Rizo PA-C sent at 4/10/2024  7:55 AM EDT -----  Urine culture is positive for infection.  Continue antibiotic therapy and follow-up if symptoms would persist.

## 2024-05-02 ENCOUNTER — OFFICE VISIT (OUTPATIENT)
Dept: OBGYN CLINIC | Facility: MEDICAL CENTER | Age: 49
End: 2024-05-02
Payer: COMMERCIAL

## 2024-05-02 ENCOUNTER — TELEPHONE (OUTPATIENT)
Dept: NEUROLOGY | Facility: CLINIC | Age: 49
End: 2024-05-02

## 2024-05-02 VITALS
HEART RATE: 79 BPM | HEIGHT: 65 IN | WEIGHT: 290 LBS | BODY MASS INDEX: 48.32 KG/M2 | DIASTOLIC BLOOD PRESSURE: 88 MMHG | SYSTOLIC BLOOD PRESSURE: 133 MMHG

## 2024-05-02 DIAGNOSIS — G43.109 MIGRAINE WITH AURA AND WITHOUT STATUS MIGRAINOSUS, NOT INTRACTABLE: Primary | ICD-10-CM

## 2024-05-02 DIAGNOSIS — M25.562 CHRONIC PAIN OF BOTH KNEES: ICD-10-CM

## 2024-05-02 DIAGNOSIS — M17.0 BILATERAL PRIMARY OSTEOARTHRITIS OF KNEE: Primary | ICD-10-CM

## 2024-05-02 DIAGNOSIS — M25.561 CHRONIC PAIN OF BOTH KNEES: ICD-10-CM

## 2024-05-02 DIAGNOSIS — G89.29 CHRONIC PAIN OF BOTH KNEES: ICD-10-CM

## 2024-05-02 PROCEDURE — 99213 OFFICE O/P EST LOW 20 MIN: CPT | Performed by: ORTHOPAEDIC SURGERY

## 2024-05-02 PROCEDURE — 20610 DRAIN/INJ JOINT/BURSA W/O US: CPT | Performed by: PHYSICIAN ASSISTANT

## 2024-05-02 RX ORDER — LIDOCAINE HYDROCHLORIDE 10 MG/ML
3 INJECTION, SOLUTION INFILTRATION; PERINEURAL
Status: COMPLETED | OUTPATIENT
Start: 2024-05-02 | End: 2024-05-02

## 2024-05-02 RX ORDER — TRIAMCINOLONE ACETONIDE 40 MG/ML
40 INJECTION, SUSPENSION INTRA-ARTICULAR; INTRAMUSCULAR
Status: COMPLETED | OUTPATIENT
Start: 2024-05-02 | End: 2024-05-02

## 2024-05-02 RX ADMIN — TRIAMCINOLONE ACETONIDE 40 MG: 40 INJECTION, SUSPENSION INTRA-ARTICULAR; INTRAMUSCULAR at 07:45

## 2024-05-02 RX ADMIN — LIDOCAINE HYDROCHLORIDE 3 ML: 10 INJECTION, SOLUTION INFILTRATION; PERINEURAL at 07:45

## 2024-05-02 NOTE — PROGRESS NOTES
Assessment/Plan     1. Bilateral primary osteoarthritis of knee    2. Chronic pain of both knees      Orders Placed This Encounter   Procedures    Large joint arthrocentesis    Injection Procedure Prior Authorization         Patient has severe bilateral knee osteoarthritis.   Non operative and operative treatment options were reviewed with patient today.   Patient is not a surgical candidate at this time due to BMI >40. Goal weight is 240 lbs.   Injections: Patient received bilateral knee steroid injection today. Tolerated the procedure well. Post injection instructions reviewed including information on glucose monitoring for diabetic patients. Patient aware that they may repeat steroid injection every 3 months if needed.   Order placed for bilat knee single VS injections which can be admin in 2 months. Patient has tried CSI but is not getting 3 months of sufficient relief. Patient has constant pain that impacts her ADLs. Pain rated 10/10.   Medications: Tylenol up to 3000 mg per day and Diclofenac 75 mg BID prn pain  PT: Continue home exercises.   Bracing: Continue short hinge knee brace as needed for comfort.   Activity: Continue activity as tolerated.   Plan for next appt:  VS in 2 months of repeat CSI in 3 months  Patient was encouraged to follow up on the therapy referral provided by her neurologist.       Return if symptoms worsen or fail to improve.    I answered all of the patient's questions during the visit and provided education of the patient's condition during the visit.  The patient verbalized understanding of the information given and agrees with the plan.  This note was dictated using Prowl software.  It may contain errors including improperly dictated words.  Please contact physician directly for any questions.    History of Present Illness   Chief complaint:   Chief Complaint   Patient presents with    Left Knee - Follow-up    Right Knee - Follow-up       HPI: [place] Arrington is a 48 y.o.  female that c/o bilateral knee pain.      Mechanism of Injury: none  Pain Description: both, right knee, across the front of the knees, worsening  Palliating Factors: nothing  Provoking Factors: weight bearing  Associated Symptoms: instability, swelling   Medications: diclofenac tabs, tylenol, topical from her country  Able to take NSAIDs? If not, why: yes   Physical Therapy or Home Exercises: has attended PT in the past and continues to do her home exercises   Injections: last bilat knee CSI at last visit on 2/2/24 and reports a few weeks of pain relief prior to return to baseline pain  Bracing: has tried short hinge knee brace in the past   Previous Surgery: NA  Miscellaneous: notes she is having a rough time with her work and personal situation, she was provided a referral for therapy by her neurologist     ROS:    See HPI for musculoskeletal review.   All other systems reviewed are negative     Historical Information   Past Medical History:   Diagnosis Date    Anemia     Aneurysm (HCC)     Anxiety     Arthritis     Depression     Headache(784.0)     Hypertension     Memory loss     Migraine     Morbid obesity with BMI of 45.0-49.9, adult (HCC)      Past Surgical History:   Procedure Laterality Date    BRAIN SURGERY      BREAST CYST EXCISION Right 2021    HYSTERECTOMY  2020    AUB; still has ovaries    IR CEREBRAL ANGIOGRAPHY  01/06/2023     Social History   Social History     Substance and Sexual Activity   Alcohol Use Never     Social History     Substance and Sexual Activity   Drug Use Never     Social History     Tobacco Use   Smoking Status Never   Smokeless Tobacco Never     Family History:   Family History   Problem Relation Age of Onset    Stroke Mother     Arthritis Mother     Migraines Sister     Aneurysm Sister     Hypertension Sister     No Known Problems Maternal Grandmother     No Known Problems Maternal Grandfather     No Known Problems Paternal Grandmother     No Known Problems Paternal  Grandfather     Breast cancer Maternal Aunt         age unknown    Cancer Maternal Aunt         Breast cancer    Colon cancer Neg Hx     Ovarian cancer Neg Hx     Uterine cancer Neg Hx        Current Outpatient Medications on File Prior to Visit   Medication Sig Dispense Refill    amitriptyline (ELAVIL) 50 mg tablet Take 1 tablet (50 mg total) by mouth daily at bedtime 90 tablet 3    chlorthalidone 25 mg tablet Take 0.5 tablets (12.5 mg total) by mouth in the morning 180 tablet 1    Cholecalciferol (Vitamin D3) 50 MCG (2000 UT) capsule Take 1 capsule (2,000 Units total) by mouth every morning 90 capsule 3    famotidine (PEPCID) 40 MG tablet Take 1 tablet (40 mg total) by mouth daily at bedtime 90 tablet 3    fluticasone (FLONASE) 50 mcg/act nasal spray 1 spray into each nostril daily 16 g 0    losartan (COZAAR) 100 MG tablet Take 1 tablet (100 mg total) by mouth in the morning 90 tablet 1    omeprazole (PriLOSEC) 40 MG capsule Take 1 capsule (40 mg total) by mouth daily before breakfast 90 capsule 3    tiZANidine (ZANAFLEX) 4 mg tablet Take 1 tablet (4 mg total) by mouth daily at bedtime 30 tablet 0    verapamil (VERELAN PM) 120 MG 24 hr capsule Take 1 capsule (120 mg total) by mouth in the morning 90 capsule 0     No current facility-administered medications on file prior to visit.     No Known Allergies    Current Outpatient Medications on File Prior to Visit   Medication Sig Dispense Refill    amitriptyline (ELAVIL) 50 mg tablet Take 1 tablet (50 mg total) by mouth daily at bedtime 90 tablet 3    chlorthalidone 25 mg tablet Take 0.5 tablets (12.5 mg total) by mouth in the morning 180 tablet 1    Cholecalciferol (Vitamin D3) 50 MCG (2000 UT) capsule Take 1 capsule (2,000 Units total) by mouth every morning 90 capsule 3    famotidine (PEPCID) 40 MG tablet Take 1 tablet (40 mg total) by mouth daily at bedtime 90 tablet 3    fluticasone (FLONASE) 50 mcg/act nasal spray 1 spray into each nostril daily 16 g 0     "losartan (COZAAR) 100 MG tablet Take 1 tablet (100 mg total) by mouth in the morning 90 tablet 1    omeprazole (PriLOSEC) 40 MG capsule Take 1 capsule (40 mg total) by mouth daily before breakfast 90 capsule 3    tiZANidine (ZANAFLEX) 4 mg tablet Take 1 tablet (4 mg total) by mouth daily at bedtime 30 tablet 0    verapamil (VERELAN PM) 120 MG 24 hr capsule Take 1 capsule (120 mg total) by mouth in the morning 90 capsule 0     No current facility-administered medications on file prior to visit.       Objective   Vitals: Blood pressure 133/88, pulse 79, height 5' 5\" (1.651 m), weight 132 kg (290 lb).,Body mass index is 48.26 kg/m².    PE:  AAOx 3  WDWN  Hearing intact, no drainage from eyes  Regular rate  no audible wheezing  no abdominal distension  LE compartments soft, skin intact    bilateralknee:    Appearance:  No  swelling   No  ecchymosis  No  obvious joint deformity   No  effusion   Palpation/Tenderness:  Yes  TTP over medial joint line  Yes  TTP over lateral joint line   No  TTP over patella  No  TTP over patellar tendon  No  TTP over pes anserine bursa  Active Range of Motion:  AROM: 3- 115  Special Tests:  Valgus Stress Test: negative  Varus Stress Test: negative      Large joint arthrocentesis: bilateral knee  Universal Protocol:  Consent: Verbal consent obtained.  Risks and benefits: risks, benefits and alternatives were discussed  Consent given by: patient  Site marked: the operative site was marked  Supporting Documentation  Indications: pain   Procedure Details  Location: knee - bilateral knee  Preparation: Patient was prepped and draped in the usual sterile fashion  Needle size: 22 G  Ultrasound guidance: no  Approach: anterolateral    Medications (Right): 3 mL lidocaine 1 %; 40 mg triamcinolone acetonide 40 mg/mLMedications (Left): 3 mL lidocaine 1 %; 40 mg triamcinolone acetonide 40 mg/mL   Patient tolerance: patient tolerated the procedure well with no immediate complications  Dressing:  Sterile " dressing applied

## 2024-05-02 NOTE — TELEPHONE ENCOUNTER
Pt called in and stated she needs a new script for her nurtec sent to the pharmacy.    [address]  [facility],   [facility]   [place] [address]

## 2024-05-10 DIAGNOSIS — I10 BENIGN ESSENTIAL HYPERTENSION: ICD-10-CM

## 2024-05-10 RX ORDER — CHLORTHALIDONE 25 MG/1
12.5 TABLET ORAL DAILY
Qty: 90 TABLET | Refills: 0 | Status: SHIPPED | OUTPATIENT
Start: 2024-05-10

## 2024-05-13 ENCOUNTER — TELEPHONE (OUTPATIENT)
Age: 49
End: 2024-05-13

## 2024-05-13 NOTE — TELEPHONE ENCOUNTER
Patient called in and wanted to leave a message for Dr. Lopez's. Patient stated that he knee's gave out for no reason. Patient didn't want to come in but wanted to let Dr. Lopez's.    Patient wanted to know what she should do. Please call patient back at  and advise.     Thank you

## 2024-05-15 ENCOUNTER — APPOINTMENT (EMERGENCY)
Dept: RADIOLOGY | Facility: HOSPITAL | Age: 49
End: 2024-05-15
Payer: COMMERCIAL

## 2024-05-15 ENCOUNTER — HOSPITAL ENCOUNTER (EMERGENCY)
Facility: HOSPITAL | Age: 49
Discharge: HOME | End: 2024-05-15
Attending: EMERGENCY MEDICINE
Payer: COMMERCIAL

## 2024-05-15 ENCOUNTER — APPOINTMENT (EMERGENCY)
Dept: CT IMAGING | Facility: HOSPITAL | Age: 49
End: 2024-05-15
Payer: COMMERCIAL

## 2024-05-15 VITALS
BODY MASS INDEX: 47.55 KG/M2 | OXYGEN SATURATION: 95 % | RESPIRATION RATE: 20 BRPM | WEIGHT: 285.72 LBS | HEART RATE: 82 BPM | TEMPERATURE: 99 F | SYSTOLIC BLOOD PRESSURE: 94 MMHG | DIASTOLIC BLOOD PRESSURE: 55 MMHG

## 2024-05-15 DIAGNOSIS — R53.83 MALAISE AND FATIGUE: ICD-10-CM

## 2024-05-15 DIAGNOSIS — R53.81 MALAISE AND FATIGUE: ICD-10-CM

## 2024-05-15 DIAGNOSIS — M25.561 RIGHT KNEE PAIN: ICD-10-CM

## 2024-05-15 DIAGNOSIS — E87.6 HYPOKALEMIA: ICD-10-CM

## 2024-05-15 DIAGNOSIS — R19.7 DIARRHEA: Primary | ICD-10-CM

## 2024-05-15 DIAGNOSIS — S63.601A SPRAIN OF RIGHT THUMB: ICD-10-CM

## 2024-05-15 LAB
ANION GAP SERPL CALCULATED.3IONS-SCNC: 10 MMOL/L (ref 4–13)
BASOPHILS # BLD AUTO: 0.03 THOUSANDS/ÂΜL (ref 0–0.1)
BASOPHILS NFR BLD AUTO: 0 % (ref 0–1)
BILIRUB UR QL STRIP: NEGATIVE
BUN SERPL-MCNC: 12 MG/DL (ref 5–25)
CALCIUM SERPL-MCNC: 9.3 MG/DL (ref 8.4–10.2)
CHLORIDE SERPL-SCNC: 102 MMOL/L (ref 96–108)
CLARITY UR: CLEAR
CO2 SERPL-SCNC: 27 MMOL/L (ref 21–32)
COLOR UR: YELLOW
CREAT SERPL-MCNC: 0.84 MG/DL (ref 0.6–1.3)
EOSINOPHIL # BLD AUTO: 0.08 THOUSAND/ÂΜL (ref 0–0.61)
EOSINOPHIL NFR BLD AUTO: 1 % (ref 0–6)
ERYTHROCYTE [DISTWIDTH] IN BLOOD BY AUTOMATED COUNT: 17.3 % (ref 11.6–15.1)
GFR SERPL CREATININE-BSD FRML MDRD: 83 ML/MIN/1.73SQ M
GLUCOSE SERPL-MCNC: 129 MG/DL (ref 65–140)
GLUCOSE UR STRIP-MCNC: NEGATIVE MG/DL
HCT VFR BLD AUTO: 37.2 % (ref 34.8–46.1)
HGB BLD-MCNC: 11.4 G/DL (ref 11.5–15.4)
HGB UR QL STRIP.AUTO: NEGATIVE
IMM GRANULOCYTES # BLD AUTO: 0.06 THOUSAND/UL (ref 0–0.2)
IMM GRANULOCYTES NFR BLD AUTO: 1 % (ref 0–2)
KETONES UR STRIP-MCNC: NEGATIVE MG/DL
LEUKOCYTE ESTERASE UR QL STRIP: NEGATIVE
LYMPHOCYTES # BLD AUTO: 2.31 THOUSANDS/ÂΜL (ref 0.6–4.47)
LYMPHOCYTES NFR BLD AUTO: 21 % (ref 14–44)
MCH RBC QN AUTO: 22.4 PG (ref 26.8–34.3)
MCHC RBC AUTO-ENTMCNC: 30.6 G/DL (ref 31.4–37.4)
MCV RBC AUTO: 73 FL (ref 82–98)
MONOCYTES # BLD AUTO: 0.53 THOUSAND/ÂΜL (ref 0.17–1.22)
MONOCYTES NFR BLD AUTO: 5 % (ref 4–12)
NEUTROPHILS # BLD AUTO: 8.14 THOUSANDS/ÂΜL (ref 1.85–7.62)
NEUTS SEG NFR BLD AUTO: 72 % (ref 43–75)
NITRITE UR QL STRIP: NEGATIVE
NRBC BLD AUTO-RTO: 0 /100 WBCS
PH UR STRIP.AUTO: 6 [PH] (ref 4.5–8)
PLATELET # BLD AUTO: 370 THOUSANDS/UL (ref 149–390)
PMV BLD AUTO: 10.2 FL (ref 8.9–12.7)
POTASSIUM SERPL-SCNC: 3.2 MMOL/L (ref 3.5–5.3)
PROT UR STRIP-MCNC: NEGATIVE MG/DL
RBC # BLD AUTO: 5.09 MILLION/UL (ref 3.81–5.12)
SODIUM SERPL-SCNC: 139 MMOL/L (ref 135–147)
SP GR UR STRIP.AUTO: >=1.03 (ref 1–1.03)
UROBILINOGEN UR QL STRIP.AUTO: 0.2 E.U./DL
WBC # BLD AUTO: 11.15 THOUSAND/UL (ref 4.31–10.16)

## 2024-05-15 PROCEDURE — 73130 X-RAY EXAM OF HAND: CPT

## 2024-05-15 PROCEDURE — 80048 BASIC METABOLIC PNL TOTAL CA: CPT | Performed by: EMERGENCY MEDICINE

## 2024-05-15 PROCEDURE — 99285 EMERGENCY DEPT VISIT HI MDM: CPT | Performed by: EMERGENCY MEDICINE

## 2024-05-15 PROCEDURE — 73140 X-RAY EXAM OF FINGER(S): CPT

## 2024-05-15 PROCEDURE — 96360 HYDRATION IV INFUSION INIT: CPT

## 2024-05-15 PROCEDURE — 36415 COLL VENOUS BLD VENIPUNCTURE: CPT | Performed by: EMERGENCY MEDICINE

## 2024-05-15 PROCEDURE — 73564 X-RAY EXAM KNEE 4 OR MORE: CPT

## 2024-05-15 PROCEDURE — 93005 ELECTROCARDIOGRAM TRACING: CPT

## 2024-05-15 PROCEDURE — 73502 X-RAY EXAM HIP UNI 2-3 VIEWS: CPT

## 2024-05-15 PROCEDURE — 81003 URINALYSIS AUTO W/O SCOPE: CPT

## 2024-05-15 PROCEDURE — 70450 CT HEAD/BRAIN W/O DYE: CPT

## 2024-05-15 PROCEDURE — 85025 COMPLETE CBC W/AUTO DIFF WBC: CPT | Performed by: EMERGENCY MEDICINE

## 2024-05-15 PROCEDURE — 99285 EMERGENCY DEPT VISIT HI MDM: CPT

## 2024-05-15 RX ORDER — POTASSIUM CHLORIDE 20 MEQ/1
20 TABLET, EXTENDED RELEASE ORAL ONCE
Status: COMPLETED | OUTPATIENT
Start: 2024-05-15 | End: 2024-05-15

## 2024-05-15 RX ADMIN — SODIUM CHLORIDE 1000 ML: 0.9 INJECTION, SOLUTION INTRAVENOUS at 15:00

## 2024-05-15 RX ADMIN — POTASSIUM CHLORIDE 20 MEQ: 1500 TABLET, EXTENDED RELEASE ORAL at 16:04

## 2024-05-15 NOTE — ED PROVIDER NOTES
History  Chief Complaint   Patient presents with    Syncope     Pt reports syncopal episode Monday while at work, pt unsure of headstrike, pt c/o pain to bilateal knees and R side. Pt endorses weakness, diarrhea x5 days     A 49 yo female with pmhx of cerebral aneurysm s/p left MCA clipping (2018 at [facility]), depression, HTN and diabetes; presents with generalized malaise and diarrhea that has been ongoing for the past few days.  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  Pt states with the episode she fell onto her butt then right side, she is not sure if she struck her head.  She is unsure if she lost consciousness.  Pt states since the fall she has had ongoing right hand, right hip and right knee pain.  Pt has been able to ambulate.  Pt otherwise denies fever, chills, chest pain, SOB, abd pain, N/V, peripheral edema and rashes.         History provided by:  Patient and medical records      Prior to Admission Medications   Prescriptions Last Dose Informant Patient Reported? Taking?   Cholecalciferol (Vitamin D3) 50 MCG ( UT) capsule  Self No No   Sig: Take 1 capsule (2,000 Units total) by mouth every morning   amitriptyline (ELAVIL) 50 mg tablet  Self No No   Sig: Take 1 tablet (50 mg total) by mouth daily at bedtime   chlorthalidone 25 mg tablet   No No   Sig: Take 0.5 tablets (12.5 mg total) by mouth in the morning   famotidine (PEPCID) 40 MG tablet  Self No No   Sig: Take 1 tablet (40 mg total) by mouth daily at bedtime   fluticasone (FLONASE) 50 mcg/act nasal spray  Self No No   Si spray into each nostril daily   losartan (COZAAR) 100 MG tablet  Self No No   Sig: Take 1 tablet (100 mg total) by mouth in the morning   omeprazole (PriLOSEC) 40 MG capsule  Self No No   Sig: Take 1 capsule (40 mg total) by mouth daily before breakfast   rimegepant sulfate (NURTEC) 75 mg TBDP   No No   Sig: TAKE ONE TABLET UNDER TONGUE AT ONSET. LIMIT 1 TABLET IN 24 HOURS   tiZANidine  (ZANAFLEX) 4 mg tablet   No No   Sig: Take 1 tablet (4 mg total) by mouth daily at bedtime   verapamil (VERELAN PM) 120 MG 24 hr capsule  Self No No   Sig: Take 1 capsule (120 mg total) by mouth in the morning      Facility-Administered Medications: None       Past Medical History:   Diagnosis Date    Anemia     Aneurysm (HCC)     Anxiety     Arthritis     Depression     Headache(784.0)     Hypertension     Memory loss     Migraine     Morbid obesity with BMI of 45.0-49.9, adult (HCC)        Past Surgical History:   Procedure Laterality Date    BRAIN SURGERY      BREAST CYST EXCISION Right 2021    HYSTERECTOMY  2020    AUB; still has ovaries    IR CEREBRAL ANGIOGRAPHY  01/06/2023       Family History   Problem Relation Age of Onset    Stroke Mother     Arthritis Mother     Migraines Sister     Aneurysm Sister     Hypertension Sister     No Known Problems Maternal Grandmother     No Known Problems Maternal Grandfather     No Known Problems Paternal Grandmother     No Known Problems Paternal Grandfather     Breast cancer Maternal Aunt         age unknown    Cancer Maternal Aunt         Breast cancer    Colon cancer Neg Hx     Ovarian cancer Neg Hx     Uterine cancer Neg Hx      I have reviewed and agree with the history as documented.    E-Cigarette/Vaping    E-Cigarette Use Never User      E-Cigarette/Vaping Substances    Nicotine No     THC No     CBD No     Flavoring No     Other No     Unknown No      Social History     Tobacco Use    Smoking status: Never    Smokeless tobacco: Never   Vaping Use    Vaping status: Never Used   Substance Use Topics    Alcohol use: Never    Drug use: Never       Review of Systems   Constitutional:  Positive for fatigue.   Cardiovascular:  Positive for syncope.   Gastrointestinal:  Positive for diarrhea.   Musculoskeletal:  Positive for arthralgias.   All other systems reviewed and are negative.      Physical Exam  Physical Exam  General Appearance: alert and oriented, nad, non toxic  appearing  Skin:  Warm, dry, intact.  No cyanosis  HEENT: Atraumatic, normocephalic.  No eye drainage.  Normal hearing.  Moist mucous membranes.    Neck: Supple, trachea midline  Cardiac: RRR; no murmurs, rub, gallops.  No pedal edema, 2+ pulses  Pulmonary: lungs CTAB; no wheezes, rales, rhonchi  Gastrointestinal: abdomen soft, nontender, nondistended; no guarding or rebound tenderness; good bowel sounds, no mass or bruits  Extremities:  Right thumb tender to palpation at the MCP joint and snuffbox.  Remainder of right hand and RUE nontender with full ROM.  Lateral right hip tender to palpation, diffuse right knee tender to palpation.  Remainder of RLE nontender with full ROM.  LUE and LLE nontender with full ROM.  No deformities.  No calf tenderness, no clubbing  Neuro:  no focal motor or sensory deficits, CN 2-12 grossly intact  Psych:  Normal mood and affect, normal judgement and insight      Vital Signs  ED Triage Vitals   Temperature Pulse Respirations Blood Pressure SpO2   05/15/24 1422 05/15/24 1422 05/15/24 1422 05/15/24 1423 05/15/24 1422   99 °F (37.2 °C) (!) 113 18 157/86 95 %      Temp Source Heart Rate Source Patient Position - Orthostatic VS BP Location FiO2 (%)   05/15/24 1422 05/15/24 1422 05/15/24 1422 05/15/24 1422 --   Oral Monitor Sitting Right arm       Pain Score       --                  Vitals:    05/15/24 1422 05/15/24 1423 05/15/24 1615 05/15/24 1815   BP:  157/86 112/67 94/55   Pulse: (!) 113  88 82   Patient Position - Orthostatic VS: Sitting            Visual Acuity      ED Medications  Medications   sodium chloride 0.9 % bolus 1,000 mL (0 mL Intravenous Stopped 5/15/24 1600)   potassium chloride (Klor-Con M20) CR tablet 20 mEq (20 mEq Oral Given 5/15/24 1604)       Diagnostic Studies  Results Reviewed       Procedure Component Value Units Date/Time    Urine Macroscopic, POC [[id number]] Collected: 05/15/24 1602    Lab Status: Final result Specimen: Urine Updated: 05/15/24 1604     Color,  UA Yellow     Clarity, UA Clear     pH, UA 6.0     Leukocytes, UA Negative     Nitrite, UA Negative     Protein, UA Negative mg/dl      Glucose, UA Negative mg/dl      Ketones, UA Negative mg/dl      Urobilinogen, UA 0.2 E.U./dl      Bilirubin, UA Negative     Occult Blood, UA Negative     Specific Gravity, UA >=1.030    Narrative:      CLINITEK RESULT    Basic metabolic panel [[id number]]  (Abnormal) Collected: 05/15/24 1500    Lab Status: Final result Specimen: Blood from Arm, Left Updated: 05/15/24 1525     Sodium 139 mmol/L      Potassium 3.2 mmol/L      Chloride 102 mmol/L      CO2 27 mmol/L      ANION GAP 10 mmol/L      BUN 12 mg/dL      Creatinine 0.84 mg/dL      Glucose 129 mg/dL      Calcium 9.3 mg/dL      eGFR 83 ml/min/1.73sq m     Narrative:      National Kidney Disease Foundation guidelines for Chronic Kidney Disease (CKD):     Stage 1 with normal or high GFR (GFR > 90 mL/min/1.73 square meters)    Stage 2 Mild CKD (GFR = 60-89 mL/min/1.73 square meters)    Stage 3A Moderate CKD (GFR = 45-59 mL/min/1.73 square meters)    Stage 3B Moderate CKD (GFR = 30-44 mL/min/1.73 square meters)    Stage 4 Severe CKD (GFR = 15-29 mL/min/1.73 square meters)    Stage 5 End Stage CKD (GFR <15 mL/min/1.73 square meters)  Note: GFR calculation is accurate only with a steady state creatinine    CBC and differential [[id number]]  (Abnormal) Collected: 05/15/24 1500    Lab Status: Final result Specimen: Blood from Arm, Left Updated: 05/15/24 1508     WBC 11.15 Thousand/uL      RBC 5.09 Million/uL      Hemoglobin 11.4 g/dL      Hematocrit 37.2 %      MCV 73 fL      MCH 22.4 pg      MCHC 30.6 g/dL      RDW 17.3 %      MPV 10.2 fL      Platelets 370 Thousands/uL      nRBC 0 /100 WBCs      Segmented % 72 %      Immature Grans % 1 %      Lymphocytes % 21 %      Monocytes % 5 %      Eosinophils Relative 1 %      Basophils Relative 0 %      Absolute Neutrophils 8.14 Thousands/µL      Absolute Immature Grans 0.06 Thousand/uL       Absolute Lymphocytes 2.31 Thousands/µL      Absolute Monocytes 0.53 Thousand/µL      Eosinophils Absolute 0.08 Thousand/µL      Basophils Absolute 0.03 Thousands/µL                    CT head without contrast   Final Result by Manuel Rosa MD (05/15 1618)      No acute intracranial abnormality.      Stable postsurgical changes of left pterional craniotomy for aneurysm clipping of left MCA bifurcation aneurysm. Known small aneurysms in left MCA M2 branch vessel were better evaluated on CTA head 1/24/2024.                     Workstation performed: [id number]         XR hand 3+ views RIGHT   ED Interpretation by Citlaly Long DO (05/15 1724)   No acute fracture or dislocation    Image independently interpreted by myself        Final Result by Leandro Castro MD (05/16 0920)      No acute osseous abnormality.      Workstation performed: [id number]         XR thumb first digit-min 2 views RIGHT   ED Interpretation by Citlaly Long DO (05/15 1725)   No acute fracture or dislocation    Image independently interpreted by myself        Final Result by Leandro Castro MD (05/16 0919)      No acute osseous abnormality.               Workstation performed: [id number]         XR hip/pelv 2-3 vws right   ED Interpretation by Citlaly Long DO (05/15 1725)   No acute fracture or dislocation    Image independently interpreted by myself        Final Result by Leandro Castro MD (05/16 0920)      No acute osseous abnormality.            Workstation performed: [id number]         XR knee 4+ views Right injury   ED Interpretation by Citlaly Long DO (05/15 1724)   No acute fracture or dislocation.  Degenerative changes noted    Image independently interpreted by myself        Final Result by Leandro Castro MD (05/16 0921)      No acute osseous abnormality.      Degenerative changes as described.            Workstation performed: [id number]                    Procedures  ECG 12 Lead  Documentation  Date/Time: today/date: 5/17/2024  Performed by: Citlaly Long    ECG reviewed by me, the ED Provider: yes    Patient location:  ED   Previous ECG:  Compared to current, no change   Rate:  103  ECG rate assessment: normal    Rhythm: sinus tachycardia    Ectopy:  none    QRS axis:  Normal  Intervals: normal   Q waves: None   ST segments:  Normal  T waves: inverted in II, III, aVF      Impression: Sinus tachycardia with T wave inversions inferiorly, unchanged from prior      Splint application    Date/Time: 5/15/2024 6:06 PM    Performed by: Citlaly Long DO  Authorized by: Citlaly Long DO  Universal Protocol:  Consent: Verbal consent obtained.    Pre-procedure details:     Sensation:  Normal  Procedure details:     Laterality:  Right    Location:  Finger    Finger:  R thumb    Splint type:  Thumb spica (static)    Supplies used: velcro splint.  Post-procedure details:     Pain:  Improved    Sensation:  Normal    Patient tolerance of procedure:  Tolerated well, no immediate complications           ED Course  ED Course as of 05/17/24 2009   Wed May 15, 2024   1556 Potassium(!): 3.2  Likely due to diarrhea, will replete   1701 CT head without contrast  1.) No acute intracranial abnormality.  2.) Stable postsurgical changes of left pterional craniotomy for aneurysm clipping of left MCA bifurcation aneurysm. Known small aneurysms in left MCA M2 branch vessel were better evaluated on CTA head 1/24/2024.   1801 Pt updated on results, she reports to feeling better.  She has been able to ambulate in the ED.  Given location of pain to right thumb, will place thumb spica and recommend follow up with hand surgery.  Pt in agreement.                                             Medical Decision Making  A 48-year-old female presents with ongoing malaise and diarrhea.  Also with questionable syncopal episode two days ago, now with right thumb/hand, hip and knee pain.  Will check labs for electrolyte abnormality,  ARNOLD, anemia and significant leukocytosis.  Will check EKG for arrhythmia.  Will obtain imaging to rule out traumatic injury.  Will give IV fluid and reassess.    Amount and/or Complexity of Data Reviewed  Labs: ordered. Decision-making details documented in ED Course.  Radiology: ordered and independent interpretation performed. Decision-making details documented in ED Course.    Risk  Prescription drug management.             Disposition  Final diagnoses:   Diarrhea   Hypokalemia   Malaise and fatigue   Sprain of right thumb   Right knee pain     Time reflects when diagnosis was documented in both MDM as applicable and the Disposition within this note       Time User Action Codes Description Comment    5/15/2024  6:08 PM [place], Citlaly Add [R19.7] Diarrhea     5/15/2024  6:09 PM [place], Citlaly Add [E87.6] Hypokalemia     5/15/2024  6:09 PM [place], Citlaly Add [R53.81,  R53.83] Malaise and fatigue     5/15/2024  6:09 PM [place], Citlaly Add [S63.601A] Sprain of right thumb     5/15/2024  6:09 PM [place], Citlaly Add [M25.561] Right knee pain           ED Disposition       ED Disposition   Discharge    Condition   Stable    Date/Time   Wed May 15, 2024  6:08 PM    Comment   [place] Arrington discharge to home/self care.                   Follow-up Information       Follow up With Specialties Details Why Contact Info Additional Information    Jamie Rizo PA-C Family Medicine, Physician Assistant Schedule an appointment as soon as possible for a visit in 3 days For re-evaluation [address] [facility] [address]  [facility] [address] [phone]       [facility] Schedule an appointment as soon as possible for a visit  For re-evaluation with HAND SURGERY [address]  [facility] [address] [phone] [facility] [place] [address] [address], [place], [place], [address] [phone]            Discharge  Medication List as of 5/15/2024  6:10 PM        CONTINUE these medications which have NOT CHANGED    Details   amitriptyline (ELAVIL) 50 mg tablet Take 1 tablet (50 mg total) by mouth daily at bedtime, Starting Thu 4/4/2024, Normal      chlorthalidone 25 mg tablet Take 0.5 tablets (12.5 mg total) by mouth in the morning, Starting Fri 5/10/2024, Normal      Cholecalciferol (Vitamin D3) 50 MCG (2000 UT) capsule Take 1 capsule (2,000 Units total) by mouth every morning, Starting Fri 10/14/2022, Normal      famotidine (PEPCID) 40 MG tablet Take 1 tablet (40 mg total) by mouth daily at bedtime, Starting Thu 6/15/2023, Normal      fluticasone (FLONASE) 50 mcg/act nasal spray 1 spray into each nostril daily, Starting Tue 1/9/2024, Normal      losartan (COZAAR) 100 MG tablet Take 1 tablet (100 mg total) by mouth in the morning, Starting Wed 4/3/2024, Normal      omeprazole (PriLOSEC) 40 MG capsule Take 1 capsule (40 mg total) by mouth daily before breakfast, Starting Thu 6/15/2023, Normal      rimegepant sulfate (NURTEC) 75 mg TBDP TAKE ONE TABLET UNDER TONGUE AT ONSET. LIMIT 1 TABLET IN 24 HOURS, Normal      tiZANidine (ZANAFLEX) 4 mg tablet Take 1 tablet (4 mg total) by mouth daily at bedtime, Starting Thu 4/18/2024, Normal      verapamil (VERELAN PM) 120 MG 24 hr capsule Take 1 capsule (120 mg total) by mouth in the morning, Starting Mon 1/8/2024, Normal             No discharge procedures on file.    PDMP Review       None            ED Provider  Electronically Signed by             Citlaly Long DO  05/17/24 2009

## 2024-05-15 NOTE — DISCHARGE INSTRUCTIONS
Wear brace to your right thumb until you are evaluated by orthopedics.  Call tomorrow to schedule an appointment with hand surgery.

## 2024-05-16 ENCOUNTER — TELEPHONE (OUTPATIENT)
Dept: FAMILY MEDICINE CLINIC | Facility: CLINIC | Age: 49
End: 2024-05-16

## 2024-05-16 LAB
ATRIAL RATE: 103 BPM
P AXIS: 59 DEGREES
PR INTERVAL: 144 MS
QRS AXIS: 63 DEGREES
QRSD INTERVAL: 72 MS
QT INTERVAL: 308 MS
QTC INTERVAL: 403 MS
T WAVE AXIS: -22 DEGREES
VENTRICULAR RATE: 103 BPM

## 2024-05-16 PROCEDURE — 93010 ELECTROCARDIOGRAM REPORT: CPT | Performed by: INTERNAL MEDICINE

## 2024-05-16 NOTE — LETTER
[facility]  [address] [facility] [address]  [phone]    Date: 05/16/24    Jamaica Arrington  [address]  [address]  [facility] [address]    Dear Jamaica:                                                                                                                                Thank you for choosing [facility] emergency department for care.  Your primary care provider wants to make sure that your ongoing medical care is being addressed. If you require follow up care as a result of your emergency department visit, there are a few things the practice would like you to know.                As part of the network's continuing commitment to caring for our patients, we have added more same day appointments and have extended office hours to meet your medical needs. After hours, on-call physicians are available via your primary care provider's main office line.               We encourage you to contact our office prior to seeking treatment to discuss your symptoms with the medical staff.  Together, we can determine the correct course of action.  A majority of non-emergent conditions such as: common cold, flu-like symptoms, fevers, strains/sprains, dislocations, minor burns, cuts and animal bites can be treated at [facility] facilities. Diagnostic testing is available at some sites.               Of course, if you are experiencing a life threatening medical emergency call 911 or proceed directly to the nearest emergency room.    Your nearest [facility] facility is conveniently located at:    [facility] ([place])  [address] [place] [address] [address]  [place] [address]  [phone]  SKIP THE WAIT  Conveniently offered at most [facility] locations  Maud your spot online at [email or web address] or on the [facility]    Sincerely,    [facility]  Dept: [phone]

## 2024-05-16 NOTE — TELEPHONE ENCOUNTER
05/16/24 1:52 PM    Patient contacted post ED visit, VBI phone outreaches documented. Patient called practice and scheduled a follow-up ED visit.     Thank you.  Erika Pedersen [facility]

## 2024-05-20 ENCOUNTER — OFFICE VISIT (OUTPATIENT)
Dept: FAMILY MEDICINE CLINIC | Facility: CLINIC | Age: 49
End: 2024-05-20
Payer: COMMERCIAL

## 2024-05-20 VITALS
HEART RATE: 118 BPM | BODY MASS INDEX: 47.48 KG/M2 | OXYGEN SATURATION: 97 % | DIASTOLIC BLOOD PRESSURE: 76 MMHG | HEIGHT: 65 IN | SYSTOLIC BLOOD PRESSURE: 130 MMHG | WEIGHT: 285 LBS

## 2024-05-20 DIAGNOSIS — I10 BENIGN ESSENTIAL HYPERTENSION: ICD-10-CM

## 2024-05-20 DIAGNOSIS — I67.1 CEREBRAL ANEURYSM, NONRUPTURED: ICD-10-CM

## 2024-05-20 DIAGNOSIS — E11.9 TYPE 2 DIABETES MELLITUS WITHOUT COMPLICATION, WITHOUT LONG-TERM CURRENT USE OF INSULIN (HCC): ICD-10-CM
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
## 2024-05-20 DIAGNOSIS — G43.809 OTHER MIGRAINE WITHOUT STATUS MIGRAINOSUS, NOT INTRACTABLE: ICD-10-CM

## 2024-05-20 PROCEDURE — 99214 OFFICE O/P EST MOD 30 MIN: CPT | Performed by: PHYSICIAN ASSISTANT

## 2024-05-20 NOTE — PROGRESS NOTES
Ambulatory Visit  Name: Jamaica Arrington      : 1975      MRN: [id number]  Encounter Provider: Jamie Rizo PA-C  Encounter Date: 2024   Encounter department: [facility]  Patient Instructions   Assessment/plan:  1.  Fall-resolved status post syncopal episode likely related to orthostasis and dehydration.  2.  Diarrhea-resolved.  Patient was likely very dehydrated with hypokalemia when she presented to the emergency room and was hypotensive.  3.  Benign essential hypertension-stable on verapamil, chlorthalidone and losartan therapy.  4.  Cerebral aneurysm-stable, following with neurosurgery.  5.  Migraine headaches-stable, continue to follow with neurology.  Currently on Nurtec as necessary.  6.  Type 2 diabetes-stable with last A1c of 6.9 with diet control.  No medication changes.  Patient is encouraged to have labs completed and follow-up for physical in the next 4 months.    Assessment & Plan   1. Fall, initial encounter  2. Benign essential hypertension  3. Cerebral aneurysm, nonruptured  4. Other migraine without status migrainosus, not intractable  5. Type 2 diabetes mellitus without complication, without long-term current use of insulin ([facility])       History of Present Illness   {Disappearing Hyperlinks I Encounters * My Last Note * Since Last Visit * History :66788}  HPI: This is a 48-year-old female that presents to the office after being seen in the emergency room 5 days ago after having a fall, likely syncope episode.  She had been having significant diarrhea and was dehydrated.  Her blood pressure was in the 90s over 50s at the emergency room.  Her potassium was quite low.  She states that her diarrhea has resolved and she has been drinking plenty of fluids.  She has not had any leg weakness or lightheadedness since.  She is taking some potassium supplement.  She does continue on 1/2 tablet of chlorthalidone daily as well as losartan and verapamil for her blood  "pressure.  She does have history of brain aneurysm and has been very aggressive with blood pressure management.  She has had history of surgery with neurosurgical team.  She does continue to follow with neurology for chronic headaches.  She also has chronic knee pain and has appointment with orthopedic specialist this summer.        Review of Systems   Constitutional:  Negative for chills, fatigue and fever.   HENT:  Negative for congestion, ear pain and sinus pressure.    Eyes:  Negative for visual disturbance.   Respiratory:  Negative for cough, chest tightness and shortness of breath.    Cardiovascular:  Negative for chest pain and palpitations.   Gastrointestinal:  Negative for diarrhea, nausea and vomiting.   Endocrine: Negative for polyuria.   Genitourinary:  Negative for dysuria and frequency.   Musculoskeletal:  Negative for arthralgias and myalgias.   Skin:  Negative for pallor and rash.   Neurological:  Negative for dizziness, weakness, light-headedness, numbness and headaches.   Psychiatric/Behavioral:  Negative for agitation, behavioral problems and sleep disturbance.    All other systems reviewed and are negative.      Objective   {Disappearing Hyperlinks   Review Vitals * Enter New Vitals * Results Review * Labs * Imaging * Cardiology * Procedures * Lung Cancer Screening :18050}  /76 (BP Location: Left arm, Patient Position: Sitting, Cuff Size: Standard)   Pulse (!) 118   Ht 5' 5\" (1.651 m)   Wt 129 kg (285 lb)   LMP  (LMP Unknown)   SpO2 97%   BMI 47.43 kg/m²     Physical Exam  Constitutional:       General: She is not in acute distress.     Appearance: Normal appearance.   HENT:      Head: Normocephalic and atraumatic.      Right Ear: Tympanic membrane normal.      Left Ear: Tympanic membrane normal.      Nose: No congestion or rhinorrhea.   Eyes:      Conjunctiva/sclera: Conjunctivae normal.      Pupils: Pupils are equal, round, and reactive to light.   Neck:      Vascular: No carotid " bruit.   Cardiovascular:      Rate and Rhythm: Normal rate and regular rhythm.      Heart sounds: No murmur heard.  Pulmonary:      Effort: Pulmonary effort is normal. No respiratory distress.      Breath sounds: Normal breath sounds.   Abdominal:      Palpations: Abdomen is soft.   Musculoskeletal:         General: Normal range of motion.      Cervical back: Normal range of motion and neck supple. No muscular tenderness.   Lymphadenopathy:      Cervical: No cervical adenopathy.   Skin:     General: Skin is warm.      Capillary Refill: Capillary refill takes less than 2 seconds.   Neurological:      General: No focal deficit present.      Mental Status: She is alert and oriented to person, place, and time.   Psychiatric:         Mood and Affect: Mood normal.       Administrative Statements {Disappearing Hyperlinks I  Level of Service * [facility]}

## 2024-05-20 NOTE — PATIENT INSTRUCTIONS
Assessment/plan:  1.  Fall-resolved status post syncopal episode likely related to orthostasis and dehydration.  2.  Diarrhea-resolved.  Patient was likely very dehydrated with hypokalemia when she presented to the emergency room and was hypotensive.  3.  Benign essential hypertension-stable on verapamil, chlorthalidone and losartan therapy.  4.  Cerebral aneurysm-stable, following with neurosurgery.  5.  Migraine headaches-stable, continue to follow with neurology.  Currently on Nurtec as necessary.  6.  Type 2 diabetes-stable with last A1c of 6.9 with diet control.  No medication changes.  Patient is encouraged to have labs completed and follow-up for physical in the next 4 months.

## 2024-06-27 ENCOUNTER — TELEPHONE (OUTPATIENT)
Dept: NEUROLOGY | Facility: CLINIC | Age: 49
End: 2024-06-27

## 2024-06-27 ENCOUNTER — OFFICE VISIT (OUTPATIENT)
Dept: FAMILY MEDICINE CLINIC | Facility: CLINIC | Age: 49
End: 2024-06-27
Payer: COMMERCIAL

## 2024-06-27 VITALS
HEIGHT: 65 IN | OXYGEN SATURATION: 98 % | WEIGHT: 289.8 LBS | SYSTOLIC BLOOD PRESSURE: 140 MMHG | BODY MASS INDEX: 48.28 KG/M2 | DIASTOLIC BLOOD PRESSURE: 90 MMHG | HEART RATE: 98 BPM | TEMPERATURE: 98.2 F

## 2024-06-27 DIAGNOSIS — H10.32 ACUTE CONJUNCTIVITIS OF LEFT EYE, UNSPECIFIED ACUTE CONJUNCTIVITIS TYPE: Primary | ICD-10-CM

## 2024-06-27 DIAGNOSIS — E11.9 TYPE 2 DIABETES MELLITUS WITHOUT COMPLICATION, WITHOUT LONG-TERM CURRENT USE OF INSULIN (HCC): ICD-10-CM

## 2024-06-27 PROCEDURE — 99214 OFFICE O/P EST MOD 30 MIN: CPT | Performed by: PHYSICIAN ASSISTANT

## 2024-06-27 RX ORDER — OFLOXACIN 3 MG/ML
1 SOLUTION/ DROPS OPHTHALMIC 4 TIMES DAILY
Qty: 5 ML | Refills: 0 | Status: SHIPPED | OUTPATIENT
Start: 2024-06-27

## 2024-06-27 NOTE — PROGRESS NOTES
Ambulatory Visit  Name: Jamaica Arrington      : 1975      MRN: [id number]  Encounter Provider: Jamie Rizo PA-C  Encounter Date: 2024   Encounter department: [facility]  Patient Instructions   Assessment/plan:  1.  Acute conjunctivitis-possibly bacterial, will start patient on ofloxacin drops 4 times daily for 4 to 5 days.  Follow-up if symptoms are persisting.  2.  Type 2 diabetes-status unknown.  Recommend patient have blood work done and follow-up in the next 3 months.  3.  Hypertension-presently stable on losartan and chlorthalidone, no medication changes.    Assessment & Plan   1. Acute conjunctivitis of left eye, unspecified acute conjunctivitis type  -     ofloxacin (OCUFLOX) 0.3 % ophthalmic solution; Administer 1 drop to the right eye 4 (four) times a day  2. Type 2 diabetes mellitus without complication, without long-term current use of insulin (HCC)  -     Albumin / creatinine urine ratio; Future; Expected date: 2024  -     Comprehensive metabolic panel; Future; Expected date: 2024  -     Hemoglobin A1C; Future; Expected date: 2024  -     CBC and differential; Future; Expected date: 2024  -     Lipid Panel with Direct LDL reflex; Future; Expected date: 2024  -     TSH, 3rd generation with Free T4 reflex; Future; Expected date: 2024      Depression Screening and Follow-up Plan: Patient's depression screening was positive with a PHQ-2 score of 3. Their PHQ-9 score was 3. Patient assessed for underlying major depression. Brief counseling provided and recommend additional follow-up/re-evaluation next office visit.       History of Present Illness     HPI: This is a 48-year-old female who presents to the office with concerns over redness that developed in her left thigh last night and seem to be a little bit better this morning but there was some watering from the eye.  She has had a little bit of irritation but no pain and she denies  "any visual changes in the eye.  She does have a history of type 2 diabetes and hypertension which have been stable on her current regimen.        Review of Systems   Constitutional:  Negative for chills, fatigue and fever.   HENT:  Negative for congestion, ear pain and sinus pressure.    Eyes:  Positive for discharge and redness. Negative for pain, itching and visual disturbance.   Respiratory:  Negative for cough, chest tightness and shortness of breath.    Cardiovascular:  Negative for chest pain and palpitations.   Gastrointestinal:  Negative for diarrhea, nausea and vomiting.   Endocrine: Negative for polyuria.   Genitourinary:  Negative for dysuria and frequency.   Musculoskeletal:  Negative for arthralgias and myalgias.   Skin:  Negative for pallor and rash.   Neurological:  Negative for dizziness, weakness, light-headedness, numbness and headaches.   Psychiatric/Behavioral:  Negative for agitation, behavioral problems and sleep disturbance.    All other systems reviewed and are negative.      Objective     /90 (BP Location: Left arm, Patient Position: Sitting, Cuff Size: Extra-Large)   Pulse 98   Temp 98.2 °F (36.8 °C) (Tympanic)   Ht 5' 5\" (1.651 m)   Wt 131 kg (289 lb 12.8 oz)   LMP  (LMP Unknown)   SpO2 98%   BMI 48.23 kg/m²     Physical Exam  Constitutional:       General: She is not in acute distress.     Appearance: Normal appearance.   HENT:      Head: Normocephalic and atraumatic.      Right Ear: Tympanic membrane normal.      Left Ear: Tympanic membrane normal.      Nose: No congestion or rhinorrhea.   Eyes:      Conjunctiva/sclera: Conjunctivae normal.      Pupils: Pupils are equal, round, and reactive to light.      Comments: There is mild scleral injection of the left lower lateral sclera.  There is no purulence seen on exam.  Limbus is clear.   Neck:      Vascular: No carotid bruit.   Cardiovascular:      Rate and Rhythm: Normal rate and regular rhythm.      Heart sounds: No murmur " heard.  Pulmonary:      Effort: Pulmonary effort is normal. No respiratory distress.      Breath sounds: Normal breath sounds.   Abdominal:      Palpations: Abdomen is soft.   Musculoskeletal:         General: Normal range of motion.      Cervical back: Normal range of motion and neck supple. No muscular tenderness.   Lymphadenopathy:      Cervical: No cervical adenopathy.   Skin:     General: Skin is warm.      Capillary Refill: Capillary refill takes less than 2 seconds.   Neurological:      General: No focal deficit present.      Mental Status: She is alert and oriented to person, place, and time.   Psychiatric:         Mood and Affect: Mood normal.       Administrative Statements

## 2024-06-27 NOTE — PATIENT INSTRUCTIONS
Assessment/plan:  1.  Acute conjunctivitis-possibly bacterial, will start patient on ofloxacin drops 4 times daily for 4 to 5 days.  Follow-up if symptoms are persisting.  2.  Type 2 diabetes-status unknown.  Recommend patient have blood work done and follow-up in the next 3 months.  3.  Hypertension-presently stable on losartan and chlorthalidone, no medication changes.

## 2024-06-27 NOTE — TELEPHONE ENCOUNTER
LMOM for pt and confirmed their  9:30am   appt on  7/9   w/  Qureshi . Reminded pt to arrive 15 mins prior to appt to check in with . Gave call back number [phone] to cancel/reschedule.

## 2024-07-03 DIAGNOSIS — I10 BENIGN ESSENTIAL HYPERTENSION: ICD-10-CM

## 2024-07-03 RX ORDER — VERAPAMIL HYDROCHLORIDE 120 MG/1
120 CAPSULE, EXTENDED RELEASE ORAL DAILY
Qty: 100 CAPSULE | Refills: 1 | Status: SHIPPED | OUTPATIENT
Start: 2024-07-03

## 2024-07-05 ENCOUNTER — PROCEDURE VISIT (OUTPATIENT)
Dept: OBGYN CLINIC | Facility: MEDICAL CENTER | Age: 49
End: 2024-07-05
Payer: COMMERCIAL

## 2024-07-05 VITALS
DIASTOLIC BLOOD PRESSURE: 87 MMHG | WEIGHT: 293 LBS | BODY MASS INDEX: 48.82 KG/M2 | SYSTOLIC BLOOD PRESSURE: 142 MMHG | HEART RATE: 94 BPM | HEIGHT: 65 IN

## 2024-07-05 DIAGNOSIS — M17.0 BILATERAL PRIMARY OSTEOARTHRITIS OF KNEE: Primary | ICD-10-CM

## 2024-07-05 PROCEDURE — 20610 DRAIN/INJ JOINT/BURSA W/O US: CPT | Performed by: ORTHOPAEDIC SURGERY

## 2024-07-05 NOTE — PROGRESS NOTES
No diagnosis found.    Recommended patient call PCP to discuss bilateral lower extremity edema.    Patient is here for her severe bilateral knee osteoarthritis injection of Durolane One Shot Series into the bilateral knees.     Patient rates their pain as 9/10 today. Right worse than left.    Physical exam of the knee shows no effusion no ecchymosis.      Large joint arthrocentesis: bilateral knee  Universal Protocol:  Consent: Verbal consent obtained.  Risks and benefits: risks, benefits and alternatives were discussed  Consent given by: patient  Timeout called at: 7/5/2024 3:17 PM.  Patient understanding: patient states understanding of the procedure being performed  Patient identity confirmed: verbally with patient  Supporting Documentation  Indications: pain   Procedure Details  Location: knee - bilateral knee  Needle size: 22 G  Ultrasound guidance: no  Approach: anterolateral    Medications (Right): 3 mL sodium hyaluronate 60 MG/3MLMedications (Left): 3 mL sodium hyaluronate 60 MG/3ML   Patient tolerance: patient tolerated the procedure well with no immediate complications        Patient tolerated procedure follow up 2 months.

## 2024-07-05 NOTE — PROGRESS NOTES
No diagnosis found.  Patient is here for {Desc; his/her:16268} *** injection of *** into the {RIGHT, LEFT, BILATERAL:25003} knee.     Patient rates their pain as ***/10 today    Physical exam of the knee shows no effusion no ecchymosis.      Procedures    Patient tolerated procedure follow up ***

## 2024-07-08 NOTE — PATIENT INSTRUCTIONS
Assessment/plan:  1.  Acute conjunctivitis-resolved with recent antibiotic drop treatment.  2.  Edema of the lower extremities-patient states onset was prior to injection for osteoarthritis of the knees.  She does have mostly collection of swelling just below the knee but she does have some prominent veins behind the knee and does have family history of clots.  Will order stat venous duplex bilaterally.  3.  Dyspnea on exertion-patient does have some baseline dyspnea on exertion but she does have history of swelling of the lower extremities and diabetes.  Would recommend echocardiogram evaluation.  4.  Benign essential hypertension-stable on verapamil, chlorthalidone and losartan, no medication changes.  5.  Type 2 diabetes-stable, patient to have blood work and follow-up in the next 3 months.

## 2024-07-09 ENCOUNTER — HOSPITAL ENCOUNTER (OUTPATIENT)
Dept: NON INVASIVE DIAGNOSTICS | Facility: CLINIC | Age: 49
Discharge: HOME | End: 2024-07-09
Payer: COMMERCIAL

## 2024-07-09 ENCOUNTER — OFFICE VISIT (OUTPATIENT)
Dept: FAMILY MEDICINE CLINIC | Facility: CLINIC | Age: 49
End: 2024-07-09
Payer: COMMERCIAL

## 2024-07-09 VITALS
SYSTOLIC BLOOD PRESSURE: 122 MMHG | BODY MASS INDEX: 48.48 KG/M2 | OXYGEN SATURATION: 95 % | WEIGHT: 291 LBS | HEIGHT: 65 IN | HEART RATE: 96 BPM | DIASTOLIC BLOOD PRESSURE: 80 MMHG

## 2024-07-09 DIAGNOSIS — M79.605 PAIN IN BOTH LOWER EXTREMITIES: ICD-10-CM

## 2024-07-09 DIAGNOSIS — E11.9 TYPE 2 DIABETES MELLITUS WITHOUT COMPLICATION, WITHOUT LONG-TERM CURRENT USE OF INSULIN (HCC): ICD-10-CM

## 2024-07-09 DIAGNOSIS — R60.9 EDEMA, UNSPECIFIED TYPE: ICD-10-CM

## 2024-07-09 DIAGNOSIS — H10.32 ACUTE CONJUNCTIVITIS OF LEFT EYE, UNSPECIFIED ACUTE CONJUNCTIVITIS TYPE: Primary | ICD-10-CM

## 2024-07-09 DIAGNOSIS — R06.09 DOE (DYSPNEA ON EXERTION): ICD-10-CM

## 2024-07-09 DIAGNOSIS — M17.0 BILATERAL PRIMARY OSTEOARTHRITIS OF KNEE: ICD-10-CM

## 2024-07-09 DIAGNOSIS — M79.604 PAIN IN BOTH LOWER EXTREMITIES: ICD-10-CM

## 2024-07-09 DIAGNOSIS — I10 BENIGN ESSENTIAL HYPERTENSION: ICD-10-CM

## 2024-07-09 PROCEDURE — 99214 OFFICE O/P EST MOD 30 MIN: CPT | Performed by: PHYSICIAN ASSISTANT

## 2024-07-09 PROCEDURE — 93970 EXTREMITY STUDY: CPT

## 2024-07-09 PROCEDURE — 93970 EXTREMITY STUDY: CPT | Performed by: SURGERY

## 2024-07-09 NOTE — PROGRESS NOTES
Ambulatory Visit  Name: Jamaica Arrington      : 1975      MRN: [id number]  Encounter Provider: Jamie Rizo PA-C  Encounter Date: 2024   Encounter department: [facility]  Patient Instructions   Assessment/plan:  1.  Acute conjunctivitis-resolved with recent antibiotic drop treatment.  2.  Edema of the lower extremities-patient states onset was prior to injection for osteoarthritis of the knees.  She does have mostly collection of swelling just below the knee but she does have some prominent veins behind the knee and does have family history of clots.  Will order stat venous duplex bilaterally.  3.  Dyspnea on exertion-patient does have some baseline dyspnea on exertion but she does have history of swelling of the lower extremities and diabetes.  Would recommend echocardiogram evaluation.  4.  Benign essential hypertension-stable on verapamil, chlorthalidone and losartan, no medication changes.  5.  Type 2 diabetes-stable, patient to have blood work and follow-up in the next 3 months.   Assessment & Plan   1. Acute conjunctivitis of left eye, unspecified acute conjunctivitis type  2. Benign essential hypertension  3. Type 2 diabetes mellitus without complication, without long-term current use of insulin ([facility])  -     Echo complete w/ contrast if indicated; Future; Expected date: 2024  4. Edema, unspecified type  -     Echo complete w/ contrast if indicated; Future; Expected date: 2024  5. Pain in both lower extremities  -      VAS VENOUS DUPLEX - LOWER LIMB BILATERAL; Future; Expected date: 2024  6. [place] (dyspnea on exertion)  -     Echo complete w/ contrast if indicated; Future; Expected date: 2024         History of Present Illness   {Disappearing Hyperlinks I Encounters * My Last Note * Since Last Visit * History :79114}  HPI: This is a 48-year-old female who presents to the office for concern over lower leg swelling.  Patient states that she was at her  orthopedic specialist about 4 days ago getting injection therapies for osteoarthritis and noted some swelling just below the knees and pain in the lower legs on both sides.  Patient does have history of diabetes and has some concern that there is family history of blood clots in both of her parents.  She does notice some prominent veins behind the calf area especially on the left side.  She is having some generalized pain and discomfort of the lower extremities.  She does have some shortness of breath on exertion but does have this baseline.  She ambulates with a cane because of arthritic knees.  She did receive injection therapy with viscosupplementation and states that her osteoarthritis pain is doing a bit better since.  She also was seen in the office recently for conjunctivitis which has resolved with antibiotic therapy.  She does have follow-up for her diabetes and blood work scheduled in September.      Review of Systems   Constitutional:  Negative for chills, fatigue and fever.   HENT:  Negative for congestion, ear pain and sinus pressure.    Eyes:  Negative for visual disturbance.   Respiratory:  Negative for cough, chest tightness and shortness of breath.         Exertional dyspnea   Cardiovascular:  Positive for leg swelling. Negative for chest pain and palpitations.   Gastrointestinal:  Negative for diarrhea, nausea and vomiting.   Endocrine: Negative for polyuria.   Genitourinary:  Negative for dysuria and frequency.   Musculoskeletal:  Negative for arthralgias and myalgias.   Skin:  Negative for pallor and rash.   Neurological:  Negative for dizziness, weakness, light-headedness, numbness and headaches.   Psychiatric/Behavioral:  Negative for agitation, behavioral problems and sleep disturbance.    All other systems reviewed and are negative.    Past Medical History:   Diagnosis Date   • Anemia    • Aneurysm (HCC)    • Anxiety    • Arthritis    • Depression    • Headache(784.0)    • Hypertension    •  Memory loss    • Migraine    • Morbid obesity with BMI of 45.0-49.9, adult (HCC)      Past Surgical History:   Procedure Laterality Date   • BRAIN SURGERY     • BREAST CYST EXCISION Right 2021   • HYSTERECTOMY  2020    AUB; still has ovaries   • IR CEREBRAL ANGIOGRAPHY  01/06/2023     Family History   Problem Relation Age of Onset   • Stroke Mother    • Arthritis Mother    • Migraines Sister    • Aneurysm Sister    • Hypertension Sister    • No Known Problems Maternal Grandmother    • No Known Problems Maternal Grandfather    • No Known Problems Paternal Grandmother    • No Known Problems Paternal Grandfather    • Breast cancer Maternal Aunt         age unknown   • Cancer Maternal Aunt         Breast cancer   • Colon cancer Neg Hx    • Ovarian cancer Neg Hx    • Uterine cancer Neg Hx      Social History     Tobacco Use   • Smoking status: Never   • Smokeless tobacco: Never   Vaping Use   • Vaping status: Never Used   Substance and Sexual Activity   • Alcohol use: Never   • Drug use: Never   • Sexual activity: Not Currently     Partners: Male     Birth control/protection: Surgical     Comment: last sexually active years ago     Current Outpatient Medications on File Prior to Visit   Medication Sig   • amitriptyline (ELAVIL) 50 mg tablet Take 1 tablet (50 mg total) by mouth daily at bedtime   • chlorthalidone 25 mg tablet Take 0.5 tablets (12.5 mg total) by mouth in the morning   • Cholecalciferol (Vitamin D3) 50 MCG (2000 UT) capsule Take 1 capsule (2,000 Units total) by mouth every morning   • fluticasone (FLONASE) 50 mcg/act nasal spray 1 spray into each nostril daily   • losartan (COZAAR) 100 MG tablet Take 1 tablet (100 mg total) by mouth in the morning   • ofloxacin (OCUFLOX) 0.3 % ophthalmic solution Administer 1 drop to the right eye 4 (four) times a day   • omeprazole (PriLOSEC) 40 MG capsule Take 1 capsule (40 mg total) by mouth daily before breakfast   • rimegepant sulfate (NURTEC) 75 mg TBDP TAKE ONE  "TABLET UNDER TONGUE AT ONSET. LIMIT 1 TABLET IN 24 HOURS   • tiZANidine (ZANAFLEX) 4 mg tablet Take 1 tablet (4 mg total) by mouth daily at bedtime   • verapamil (Verelan) 120 MG 24 hr capsule Take 1 capsule (120 mg total) by mouth in the morning   • famotidine (PEPCID) 40 MG tablet Take 1 tablet (40 mg total) by mouth daily at bedtime (Patient not taking: Reported on 7/5/2024)     No Known Allergies  Immunization History   Administered Date(s) Administered   • COVID-19 PFIZER VACCINE 0.3 ML IM 09/07/2021, 09/28/2021   • INFLUENZA 10/01/2020, 10/22/2021   • Influenza, recombinant, quadrivalent,injectable, preservative free 10/14/2022   • Tdap 11/30/2018     Objective   {Disappearing Hyperlinks   Review Vitals * Enter New Vitals * Results Review * Labs * Imaging * Cardiology * Procedures * Lung Cancer Screening :09701}  /80 (BP Location: Left arm, Patient Position: Sitting, Cuff Size: Standard)   Pulse 96   Ht 5' 5\" (1.651 m)   Wt 132 kg (291 lb)   LMP  (LMP Unknown)   SpO2 95%   BMI 48.42 kg/m²     Physical Exam  Constitutional:       General: She is not in acute distress.     Appearance: Normal appearance.   HENT:      Head: Normocephalic and atraumatic.      Right Ear: Tympanic membrane normal.      Left Ear: Tympanic membrane normal.      Nose: No congestion or rhinorrhea.   Eyes:      Conjunctiva/sclera: Conjunctivae normal.      Pupils: Pupils are equal, round, and reactive to light.   Neck:      Vascular: No carotid bruit.   Cardiovascular:      Rate and Rhythm: Normal rate and regular rhythm.      Heart sounds: No murmur heard.  Pulmonary:      Effort: Pulmonary effort is normal. No respiratory distress.      Breath sounds: Normal breath sounds.   Abdominal:      Palpations: Abdomen is soft.   Musculoskeletal:         General: Normal range of motion.      Cervical back: Normal range of motion and neck supple. No muscular tenderness.      Comments: Bilateral lower extremity edema, mostly prominent " just inferior to the patella on both sides.  This does seem to dissipate by the ankle.  Patient does have some prominent veins noted on the posterior left calf area and there is some mild tenderness to palpation.   Lymphadenopathy:      Cervical: No cervical adenopathy.   Skin:     General: Skin is warm.      Capillary Refill: Capillary refill takes less than 2 seconds.   Neurological:      General: No focal deficit present.      Mental Status: She is alert and oriented to person, place, and time.   Psychiatric:         Mood and Affect: Mood normal.

## 2024-07-10 ENCOUNTER — TELEPHONE (OUTPATIENT)
Dept: FAMILY MEDICINE CLINIC | Facility: CLINIC | Age: 49
End: 2024-07-10

## 2024-07-10 NOTE — TELEPHONE ENCOUNTER
Called Pt to discuss labs no answer LM advising CB. Upon return please go over PCP message above/below.

## 2024-07-10 NOTE — TELEPHONE ENCOUNTER
----- Message from Jamie Rizo PA-C sent at 7/10/2024  8:29 AM EDT -----  Venous duplex is negative for clot.

## 2024-07-26 ENCOUNTER — TELEPHONE (OUTPATIENT)
Age: 49
End: 2024-07-26

## 2024-07-26 DIAGNOSIS — R60.9 EDEMA, UNSPECIFIED TYPE: Primary | ICD-10-CM

## 2024-07-26 RX ORDER — FUROSEMIDE 20 MG/1
20 TABLET ORAL 2 TIMES DAILY
Qty: 7 TABLET | Refills: 0 | Status: SHIPPED | OUTPATIENT
Start: 2024-07-26

## 2024-07-26 NOTE — TELEPHONE ENCOUNTER
I will start her on furosemide 20 mg daily for 7 days.  If it is continuing longer than that she should have office visit to evaluate and will need further blood test to monitor kidney and potassium levels.

## 2024-07-26 NOTE — TELEPHONE ENCOUNTER
Pt called, she would like to know if the provider recommends her taking a water pill - her feet are still swollen, is not sure what to do. Confirmed [facility] - [facility]  Please advise?

## 2024-08-02 ENCOUNTER — HOSPITAL ENCOUNTER (OUTPATIENT)
Dept: NON INVASIVE DIAGNOSTICS | Facility: HOSPITAL | Age: 49
Discharge: HOME | End: 2024-08-02
Payer: COMMERCIAL

## 2024-08-02 VITALS
WEIGHT: 291 LBS | HEART RATE: 96 BPM | HEIGHT: 65 IN | BODY MASS INDEX: 48.48 KG/M2 | DIASTOLIC BLOOD PRESSURE: 80 MMHG | SYSTOLIC BLOOD PRESSURE: 122 MMHG

## 2024-08-02 DIAGNOSIS — R60.9 EDEMA, UNSPECIFIED TYPE: ICD-10-CM

## 2024-08-02 DIAGNOSIS — E11.9 TYPE 2 DIABETES MELLITUS WITHOUT COMPLICATION, WITHOUT LONG-TERM CURRENT USE OF INSULIN (HCC): ICD-10-CM

## 2024-08-02 DIAGNOSIS — R06.09 DOE (DYSPNEA ON EXERTION): ICD-10-CM

## 2024-08-02 LAB
AORTIC ROOT: 3 CM
AORTIC VALVE MEAN VELOCITY: 12.8 M/S
APICAL FOUR CHAMBER EJECTION FRACTION: 76 %
ASCENDING AORTA: 2.6 CM
AV LVOT MEAN GRADIENT: 4 MMHG
AV LVOT PEAK GRADIENT: 7 MMHG
AV MEAN GRADIENT: 8 MMHG
AV PEAK GRADIENT: 14 MMHG
AV VELOCITY RATIO: 0.7
BSA FOR ECHO PROCEDURE: 2.32 M2
DOP CALC AO PEAK VEL: 1.88 M/S
DOP CALC AO VTI: 37.32 CM
DOP CALC LVOT PEAK VEL VTI: 26.49 CM
DOP CALC LVOT PEAK VEL: 1.31 M/S
E WAVE DECELERATION TIME: 223 MS
E/A RATIO: 1.27
FRACTIONAL SHORTENING: 42 (ref 28–44)
INTERVENTRICULAR SEPTUM IN DIASTOLE (PARASTERNAL SHORT AXIS VIEW): 1 CM
INTERVENTRICULAR SEPTUM: 1 CM (ref 0.6–1.1)
LAAS-AP2: 19 CM2
LAAS-AP4: 18.4 CM2
LEFT ATRIUM SIZE: 3.9 CM
LEFT ATRIUM VOLUME (MOD BIPLANE): 54 ML
LEFT ATRIUM VOLUME INDEX (MOD BIPLANE): 23.3 ML/M2
LEFT INTERNAL DIMENSION IN SYSTOLE: 2.8 CM (ref 2.1–4)
LEFT VENTRICULAR INTERNAL DIMENSION IN DIASTOLE: 4.8 CM (ref 3.5–6)
LEFT VENTRICULAR POSTERIOR WALL IN END DIASTOLE: 1.1 CM
LEFT VENTRICULAR STROKE VOLUME: 80 ML
LVSV (TEICH): 80 ML
MV E'TISSUE VEL-SEP: 10 CM/S
MV PEAK A VEL: 0.91 M/S
MV PEAK E VEL: 116 CM/S
MV STENOSIS PRESSURE HALF TIME: 65 MS
MV VALVE AREA P 1/2 METHOD: 3.4
RIGHT ATRIAL 2D VOLUME: 24 ML
RIGHT ATRIUM AREA SYSTOLE A4C: 13 CM2
RIGHT VENTRICLE ID DIMENSION: 2.6 CM
SL CV LEFT ATRIUM LENGTH A2C: 5.3 CM
SL CV PED ECHO LEFT VENTRICLE DIASTOLIC VOLUME (MOD BIPLANE) 2D: 109 ML
SL CV PED ECHO LEFT VENTRICLE SYSTOLIC VOLUME (MOD BIPLANE) 2D: 29 ML
TR MAX PG: 28 MMHG
TR PEAK VELOCITY: 2.6 M/S
TRICUSPID ANNULAR PLANE SYSTOLIC EXCURSION: 2.2 CM
TRICUSPID VALVE PEAK REGURGITATION VELOCITY: 2.64 M/S

## 2024-08-02 PROCEDURE — 93306 TTE W/DOPPLER COMPLETE: CPT | Performed by: INTERNAL MEDICINE

## 2024-08-02 PROCEDURE — 93306 TTE W/DOPPLER COMPLETE: CPT

## 2024-08-05 ENCOUNTER — TELEPHONE (OUTPATIENT)
Age: 49
End: 2024-08-05

## 2024-08-05 ENCOUNTER — TELEPHONE (OUTPATIENT)
Dept: FAMILY MEDICINE CLINIC | Facility: CLINIC | Age: 49
End: 2024-08-05

## 2024-08-05 NOTE — TELEPHONE ENCOUNTER
Pt called in stating she is feeling much better now with the prescribed water pills. She wanted to convey the same to Dr.. Rizo. Thanks

## 2024-08-05 NOTE — TELEPHONE ENCOUNTER
Niki Frances5 hours ago (12:38 PM)     MU  Pt called in stating she is feeling much better now with the prescribed water pills. She wanted to convey the same to Dr.. Rizo. Thanks

## 2024-08-06 LAB
AORTIC ROOT: 3 CM
AORTIC VALVE MEAN VELOCITY: 12.8 M/S
APICAL FOUR CHAMBER EJECTION FRACTION: 76 %
ASCENDING AORTA: 2.6 CM
AV LVOT MEAN GRADIENT: 4 MMHG
AV LVOT PEAK GRADIENT: 7 MMHG
AV MEAN GRADIENT: 8 MMHG
AV PEAK GRADIENT: 14 MMHG
AV VELOCITY RATIO: 0.7
BSA FOR ECHO PROCEDURE: 2.32 M2
DOP CALC AO PEAK VEL: 1.88 M/S
DOP CALC AO VTI: 37.32 CM
DOP CALC LVOT PEAK VEL VTI: 26.49 CM
DOP CALC LVOT PEAK VEL: 1.31 M/S
E WAVE DECELERATION TIME: 223 MS
E/A RATIO: 1.27
FRACTIONAL SHORTENING: 42 (ref 28–44)
INTERVENTRICULAR SEPTUM IN DIASTOLE (PARASTERNAL SHORT AXIS VIEW): 1 CM
INTERVENTRICULAR SEPTUM: 1 CM (ref 0.6–1.1)
LAAS-AP2: 19 CM2
LAAS-AP4: 18.4 CM2
LEFT ATRIUM SIZE: 3.9 CM
LEFT ATRIUM VOLUME (MOD BIPLANE): 54 ML
LEFT ATRIUM VOLUME INDEX (MOD BIPLANE): 23.3 ML/M2
LEFT INTERNAL DIMENSION IN SYSTOLE: 2.8 CM (ref 2.1–4)
LEFT VENTRICULAR INTERNAL DIMENSION IN DIASTOLE: 4.8 CM (ref 3.5–6)
LEFT VENTRICULAR POSTERIOR WALL IN END DIASTOLE: 1.1 CM
LEFT VENTRICULAR STROKE VOLUME: 80 ML
LVSV (TEICH): 80 ML
MV E'TISSUE VEL-SEP: 10 CM/S
MV PEAK A VEL: 0.91 M/S
MV PEAK E VEL: 116 CM/S
MV STENOSIS PRESSURE HALF TIME: 65 MS
MV VALVE AREA P 1/2 METHOD: 3.38
RIGHT ATRIAL 2D VOLUME: 24 ML
RIGHT ATRIUM AREA SYSTOLE A4C: 13 CM2
RIGHT VENTRICLE ID DIMENSION: 2.6 CM
SL CV LEFT ATRIUM LENGTH A2C: 5.3 CM
SL CV LV EF: 65
SL CV PED ECHO LEFT VENTRICLE DIASTOLIC VOLUME (MOD BIPLANE) 2D: 109 ML
SL CV PED ECHO LEFT VENTRICLE SYSTOLIC VOLUME (MOD BIPLANE) 2D: 29 ML
TR MAX PG: 28 MMHG
TR PEAK VELOCITY: 2.6 M/S
TRICUSPID ANNULAR PLANE SYSTOLIC EXCURSION: 2.2 CM
TRICUSPID VALVE PEAK REGURGITATION VELOCITY: 2.64 M/S

## 2024-08-07 ENCOUNTER — TELEPHONE (OUTPATIENT)
Age: 49
End: 2024-08-07

## 2024-08-07 DIAGNOSIS — E87.6 HYPOKALEMIA: ICD-10-CM

## 2024-08-07 DIAGNOSIS — R60.9 EDEMA, UNSPECIFIED TYPE: Primary | ICD-10-CM

## 2024-08-07 NOTE — TELEPHONE ENCOUNTER
Patient called stating Dr Rizo had prescribed her a water pill (Furosemide) Patient states she felt better on that and wants to continue that medication . Please advise .

## 2024-08-08 RX ORDER — FUROSEMIDE 20 MG/1
20 TABLET ORAL 2 TIMES DAILY
Qty: 30 TABLET | Refills: 2 | Status: SHIPPED | OUTPATIENT
Start: 2024-08-08

## 2024-08-08 RX ORDER — POTASSIUM CHLORIDE 750 MG/1
10 TABLET, EXTENDED RELEASE ORAL 2 TIMES DAILY
Qty: 30 TABLET | Refills: 2 | Status: SHIPPED | OUTPATIENT
Start: 2024-08-08

## 2024-08-08 NOTE — TELEPHONE ENCOUNTER
I will continue furosemide 20 mg daily but I will also need to add a potassium supplement because it tends to lower your potassium level in time.  We should also check a kidney function blood test after being on it for about 2 weeks.

## 2024-08-08 NOTE — TELEPHONE ENCOUNTER
Patient called back from a missed called, I relayed the following from Dr. Melissa Rizo PA-C         8/8/24  7:23 AM  Note     I will continue furosemide 20 mg daily but I will also need to add a potassium supplement because it tends to lower your potassium level in time.  We should also check a kidney function blood test after being on it for about 2 weeks.      Patient understood

## 2024-08-19 ENCOUNTER — TELEPHONE (OUTPATIENT)
Dept: NEUROLOGY | Facility: CLINIC | Age: 49
End: 2024-08-19

## 2024-08-19 NOTE — TELEPHONE ENCOUNTER
LMOM for pt to confirm their  4p   appt on   8/28  w/ Qureshi  . Reminded pt to arrive 15 mins prior to appt to check in with . Gave call back number [phone] to cancel/reschedule.

## 2024-08-27 ENCOUNTER — APPOINTMENT (OUTPATIENT)
Dept: LAB | Facility: CLINIC | Age: 49
End: 2024-08-27
Payer: COMMERCIAL

## 2024-08-27 DIAGNOSIS — G43.809 OTHER MIGRAINE WITHOUT STATUS MIGRAINOSUS, NOT INTRACTABLE: ICD-10-CM

## 2024-08-27 DIAGNOSIS — R60.9 EDEMA, UNSPECIFIED TYPE: ICD-10-CM

## 2024-08-27 DIAGNOSIS — Z01.812 PRE-PROCEDURAL LABORATORY EXAMINATIONS: ICD-10-CM

## 2024-08-27 DIAGNOSIS — I67.1 CEREBRAL ANEURYSM, NONRUPTURED: ICD-10-CM

## 2024-08-27 DIAGNOSIS — E87.6 HYPOKALEMIA: ICD-10-CM

## 2024-08-27 DIAGNOSIS — I10 BENIGN ESSENTIAL HYPERTENSION: ICD-10-CM

## 2024-08-27 DIAGNOSIS — I67.1 BRAIN ANEURYSM: ICD-10-CM

## 2024-08-27 DIAGNOSIS — E66.01 MORBID OBESITY WITH BMI OF 40.0-44.9, ADULT (HCC): ICD-10-CM

## 2024-08-27 DIAGNOSIS — E11.9 TYPE 2 DIABETES MELLITUS WITHOUT COMPLICATION, WITHOUT LONG-TERM CURRENT USE OF INSULIN (HCC): ICD-10-CM

## 2024-08-27 LAB
ALBUMIN SERPL BCG-MCNC: 4 G/DL (ref 3.5–5)
ALP SERPL-CCNC: 75 U/L (ref 34–104)
ALT SERPL W P-5'-P-CCNC: 16 U/L (ref 7–52)
ANION GAP SERPL CALCULATED.3IONS-SCNC: 8 MMOL/L (ref 4–13)
AST SERPL W P-5'-P-CCNC: 18 U/L (ref 13–39)
BASOPHILS # BLD AUTO: 0.04 THOUSANDS/ÂΜL (ref 0–0.1)
BASOPHILS NFR BLD AUTO: 1 % (ref 0–1)
BILIRUB SERPL-MCNC: 0.49 MG/DL (ref 0.2–1)
BUN SERPL-MCNC: 10 MG/DL (ref 5–25)
CALCIUM SERPL-MCNC: 9.8 MG/DL (ref 8.4–10.2)
CHLORIDE SERPL-SCNC: 97 MMOL/L (ref 96–108)
CHOLEST SERPL-MCNC: 160 MG/DL
CO2 SERPL-SCNC: 33 MMOL/L (ref 21–32)
CREAT SERPL-MCNC: 0.69 MG/DL (ref 0.6–1.3)
EOSINOPHIL # BLD AUTO: 0.18 THOUSAND/ÂΜL (ref 0–0.61)
EOSINOPHIL NFR BLD AUTO: 3 % (ref 0–6)
ERYTHROCYTE [DISTWIDTH] IN BLOOD BY AUTOMATED COUNT: 17.3 % (ref 11.6–15.1)
EST. AVERAGE GLUCOSE BLD GHB EST-MCNC: 166 MG/DL
GFR SERPL CREATININE-BSD FRML MDRD: 103 ML/MIN/1.73SQ M
GLUCOSE P FAST SERPL-MCNC: 97 MG/DL (ref 65–99)
HBA1C MFR BLD: 7.4 %
HCT VFR BLD AUTO: 38.9 % (ref 34.8–46.1)
HDLC SERPL-MCNC: 36 MG/DL
HGB BLD-MCNC: 11.7 G/DL (ref 11.5–15.4)
IMM GRANULOCYTES # BLD AUTO: 0.03 THOUSAND/UL (ref 0–0.2)
IMM GRANULOCYTES NFR BLD AUTO: 1 % (ref 0–2)
LDLC SERPL CALC-MCNC: 107 MG/DL (ref 0–100)
LYMPHOCYTES # BLD AUTO: 2.17 THOUSANDS/ÂΜL (ref 0.6–4.47)
LYMPHOCYTES NFR BLD AUTO: 33 % (ref 14–44)
MCH RBC QN AUTO: 22.6 PG (ref 26.8–34.3)
MCHC RBC AUTO-ENTMCNC: 30.1 G/DL (ref 31.4–37.4)
MCV RBC AUTO: 75 FL (ref 82–98)
MONOCYTES # BLD AUTO: 0.39 THOUSAND/ÂΜL (ref 0.17–1.22)
MONOCYTES NFR BLD AUTO: 6 % (ref 4–12)
NEUTROPHILS # BLD AUTO: 3.78 THOUSANDS/ÂΜL (ref 1.85–7.62)
NEUTS SEG NFR BLD AUTO: 56 % (ref 43–75)
NRBC BLD AUTO-RTO: 0 /100 WBCS
PLATELET # BLD AUTO: 359 THOUSANDS/UL (ref 149–390)
PMV BLD AUTO: 11.5 FL (ref 8.9–12.7)
POTASSIUM SERPL-SCNC: 3.4 MMOL/L (ref 3.5–5.3)
PROT SERPL-MCNC: 7.4 G/DL (ref 6.4–8.4)
RBC # BLD AUTO: 5.18 MILLION/UL (ref 3.81–5.12)
SODIUM SERPL-SCNC: 138 MMOL/L (ref 135–147)
TRIGL SERPL-MCNC: 87 MG/DL
TSH SERPL DL<=0.05 MIU/L-ACNC: 2.61 UIU/ML (ref 0.45–4.5)
WBC # BLD AUTO: 6.59 THOUSAND/UL (ref 4.31–10.16)

## 2024-08-27 PROCEDURE — 82043 UR ALBUMIN QUANTITATIVE: CPT

## 2024-08-27 PROCEDURE — 80061 LIPID PANEL: CPT

## 2024-08-27 PROCEDURE — 83036 HEMOGLOBIN GLYCOSYLATED A1C: CPT

## 2024-08-27 PROCEDURE — 82570 ASSAY OF URINE CREATININE: CPT

## 2024-08-27 PROCEDURE — 84443 ASSAY THYROID STIM HORMONE: CPT

## 2024-08-27 PROCEDURE — 85025 COMPLETE CBC W/AUTO DIFF WBC: CPT

## 2024-08-27 PROCEDURE — 36415 COLL VENOUS BLD VENIPUNCTURE: CPT

## 2024-08-27 PROCEDURE — 80053 COMPREHEN METABOLIC PANEL: CPT

## 2024-08-28 LAB
CREAT UR-MCNC: 246.8 MG/DL
MICROALBUMIN UR-MCNC: 105.2 MG/L
MICROALBUMIN/CREAT 24H UR: 43 MG/G CREATININE (ref 0–30)

## 2024-09-06 ENCOUNTER — OFFICE VISIT (OUTPATIENT)
Dept: OBGYN CLINIC | Facility: MEDICAL CENTER | Age: 49
End: 2024-09-06
Payer: COMMERCIAL

## 2024-09-06 VITALS
BODY MASS INDEX: 47.82 KG/M2 | SYSTOLIC BLOOD PRESSURE: 142 MMHG | HEIGHT: 65 IN | HEART RATE: 94 BPM | WEIGHT: 287 LBS | DIASTOLIC BLOOD PRESSURE: 90 MMHG

## 2024-09-06 DIAGNOSIS — M25.571 ACUTE RIGHT ANKLE PAIN: ICD-10-CM

## 2024-09-06 DIAGNOSIS — M25.562 CHRONIC PAIN OF BOTH KNEES: ICD-10-CM

## 2024-09-06 DIAGNOSIS — G89.29 CHRONIC PAIN OF BOTH KNEES: ICD-10-CM

## 2024-09-06 DIAGNOSIS — M17.0 BILATERAL PRIMARY OSTEOARTHRITIS OF KNEE: Primary | ICD-10-CM

## 2024-09-06 DIAGNOSIS — M25.561 CHRONIC PAIN OF BOTH KNEES: ICD-10-CM

## 2024-09-06 PROCEDURE — 20610 DRAIN/INJ JOINT/BURSA W/O US: CPT | Performed by: ORTHOPAEDIC SURGERY

## 2024-09-06 PROCEDURE — 99213 OFFICE O/P EST LOW 20 MIN: CPT | Performed by: ORTHOPAEDIC SURGERY

## 2024-09-06 RX ORDER — TRIAMCINOLONE ACETONIDE 40 MG/ML
40 INJECTION, SUSPENSION INTRA-ARTICULAR; INTRAMUSCULAR
Status: COMPLETED | OUTPATIENT
Start: 2024-09-06 | End: 2024-09-06

## 2024-09-06 RX ORDER — ROPIVACAINE HYDROCHLORIDE 2 MG/ML
2 INJECTION, SOLUTION EPIDURAL; INFILTRATION; PERINEURAL
Status: COMPLETED | OUTPATIENT
Start: 2024-09-06 | End: 2024-09-06

## 2024-09-06 RX ORDER — TRIAMCINOLONE ACETONIDE 40 MG/ML
20 INJECTION, SUSPENSION INTRA-ARTICULAR; INTRAMUSCULAR
Status: COMPLETED | OUTPATIENT
Start: 2024-09-06 | End: 2024-09-06

## 2024-09-06 RX ADMIN — TRIAMCINOLONE ACETONIDE 40 MG: 40 INJECTION, SUSPENSION INTRA-ARTICULAR; INTRAMUSCULAR at 15:15

## 2024-09-06 RX ADMIN — TRIAMCINOLONE ACETONIDE 20 MG: 40 INJECTION, SUSPENSION INTRA-ARTICULAR; INTRAMUSCULAR at 15:15

## 2024-09-06 RX ADMIN — ROPIVACAINE HYDROCHLORIDE 2 ML: 2 INJECTION, SOLUTION EPIDURAL; INFILTRATION; PERINEURAL at 15:15

## 2024-09-06 NOTE — PROGRESS NOTES
Assessment & Plan     1. Bilateral primary osteoarthritis of knee    2. Chronic pain of both knees      Orders Placed This Encounter   Procedures    Large joint arthrocentesis: bilateral knee         Patient has severe bilateral knee osteoarthritis.   Non operative and operative treatment options were reviewed with patient today.   Patient is not a surgical candidate at this time due to her BMI  Injections: Patient received bilateral knee steroid injection today. Tolerated the procedure well. Post injection instructions reviewed including information on glucose monitoring for diabetic patients. Patient aware that they may repeat steroid injection every 3 months if needed.   Medications: Tylenol up to 3000 mg per day and Diclofenac 75 mg BID prn pain  PT: Continue home exercises.   Bracing: Continue short hinge knee brace as needed for comfort.   Activity: Continue activity as tolerated.   Patient also notes acute, atraumatic right ankle/distal calf pain. (-) briseidas no history of DVT. She does have some bilateral edema for which she has recently been prescribed a water pill by her PCP. A referral to Dr Martinez to evaluate for tendonitis was provided.  Plan for next appt: follow up in 3 months for repeat CSI or patient will call to re-initiate visco supplementation       No follow-ups on file.    I answered all of the patient's questions during the visit and provided education of the patient's condition during the visit.  The patient verbalized understanding of the information given and agrees with the plan.  This note was dictated using Logicbroker software.  It may contain errors including improperly dictated words.  Please contact physician directly for any questions.    History of Present Illness   Chief complaint:   Chief Complaint   Patient presents with    Left Knee - Follow-up    Right Knee - Follow-up       HPI: [place] Arrington is a 48 y.o. female that c/o bilateral knee pain.        Mechanism of Injury:  none  Pain Description: bilateral anterior aching pain  Palliating Factors: nothing  Provoking Factors: weight bearing  Associated Symptoms: swelling, instability   Medications: diclofenac tabs, tylenol and a  topical from [facility]   Able to take NSAIDs? If not, why: yes  Physical Therapy or Home Exercises: HEP  Injections: last CSI 5/2/24 and visco supplementation 7/5/24  Bracing: short hinge knee brace as needed   Previous Surgery: NA  Miscellaneous:      ROS:    See HPI for musculoskeletal review.   All other systems reviewed are negative     Historical Information   Past Medical History:   Diagnosis Date    Anemia     Aneurysm (HCC)     Anxiety     Arthritis     Depression     Headache(784.0)     Hypertension     Memory loss     Migraine     Morbid obesity with BMI of 45.0-49.9, adult (HCC)      Past Surgical History:   Procedure Laterality Date    BRAIN SURGERY      BREAST CYST EXCISION Right 2021    HYSTERECTOMY  2020    AUB; still has ovaries    IR CEREBRAL ANGIOGRAPHY  01/06/2023     Social History   Social History     Substance and Sexual Activity   Alcohol Use Never     Social History     Substance and Sexual Activity   Drug Use Never     Social History     Tobacco Use   Smoking Status Never   Smokeless Tobacco Never     Family History:   Family History   Problem Relation Age of Onset    Stroke Mother     Arthritis Mother     Migraines Sister     Aneurysm Sister     Hypertension Sister     No Known Problems Maternal Grandmother     No Known Problems Maternal Grandfather     No Known Problems Paternal Grandmother     No Known Problems Paternal Grandfather     Breast cancer Maternal Aunt         age unknown    Cancer Maternal Aunt         Breast cancer    Colon cancer Neg Hx     Ovarian cancer Neg Hx     Uterine cancer Neg Hx        Current Outpatient Medications on File Prior to Visit   Medication Sig Dispense Refill    amitriptyline (ELAVIL) 50 mg tablet Take 1 tablet (50 mg total) by mouth daily at bedtime  90 tablet 3    chlorthalidone 25 mg tablet Take 0.5 tablets (12.5 mg total) by mouth in the morning 90 tablet 0    Cholecalciferol (Vitamin D3) 50 MCG (2000 UT) capsule Take 1 capsule (2,000 Units total) by mouth every morning 90 capsule 3    famotidine (PEPCID) 40 MG tablet Take 1 tablet (40 mg total) by mouth daily at bedtime (Patient not taking: Reported on 7/5/2024) 90 tablet 3    fluticasone (FLONASE) 50 mcg/act nasal spray 1 spray into each nostril daily 16 g 0    furosemide (LASIX) 20 mg tablet Take 1 tablet (20 mg total) by mouth 2 (two) times a day 30 tablet 2    losartan (COZAAR) 100 MG tablet Take 1 tablet (100 mg total) by mouth in the morning 90 tablet 1    ofloxacin (OCUFLOX) 0.3 % ophthalmic solution Administer 1 drop to the right eye 4 (four) times a day 5 mL 0    omeprazole (PriLOSEC) 40 MG capsule Take 1 capsule (40 mg total) by mouth daily before breakfast 90 capsule 3    potassium chloride (Klor-Con M10) 10 mEq tablet Take 1 tablet (10 mEq total) by mouth 2 (two) times a day 30 tablet 2    rimegepant sulfate (NURTEC) 75 mg TBDP TAKE ONE TABLET UNDER TONGUE AT ONSET. LIMIT 1 TABLET IN 24 HOURS 16 tablet 6    tiZANidine (ZANAFLEX) 4 mg tablet Take 1 tablet (4 mg total) by mouth daily at bedtime 30 tablet 0    verapamil (Verelan) 120 MG 24 hr capsule Take 1 capsule (120 mg total) by mouth in the morning 100 capsule 1     No current facility-administered medications on file prior to visit.     No Known Allergies    Current Outpatient Medications on File Prior to Visit   Medication Sig Dispense Refill    amitriptyline (ELAVIL) 50 mg tablet Take 1 tablet (50 mg total) by mouth daily at bedtime 90 tablet 3    chlorthalidone 25 mg tablet Take 0.5 tablets (12.5 mg total) by mouth in the morning 90 tablet 0    Cholecalciferol (Vitamin D3) 50 MCG (2000 UT) capsule Take 1 capsule (2,000 Units total) by mouth every morning 90 capsule 3    famotidine (PEPCID) 40 MG tablet Take 1 tablet (40 mg total) by mouth  "daily at bedtime (Patient not taking: Reported on 7/5/2024) 90 tablet 3    fluticasone (FLONASE) 50 mcg/act nasal spray 1 spray into each nostril daily 16 g 0    furosemide (LASIX) 20 mg tablet Take 1 tablet (20 mg total) by mouth 2 (two) times a day 30 tablet 2    losartan (COZAAR) 100 MG tablet Take 1 tablet (100 mg total) by mouth in the morning 90 tablet 1    ofloxacin (OCUFLOX) 0.3 % ophthalmic solution Administer 1 drop to the right eye 4 (four) times a day 5 mL 0    omeprazole (PriLOSEC) 40 MG capsule Take 1 capsule (40 mg total) by mouth daily before breakfast 90 capsule 3    potassium chloride (Klor-Con M10) 10 mEq tablet Take 1 tablet (10 mEq total) by mouth 2 (two) times a day 30 tablet 2    rimegepant sulfate (NURTEC) 75 mg TBDP TAKE ONE TABLET UNDER TONGUE AT ONSET. LIMIT 1 TABLET IN 24 HOURS 16 tablet 6    tiZANidine (ZANAFLEX) 4 mg tablet Take 1 tablet (4 mg total) by mouth daily at bedtime 30 tablet 0    verapamil (Verelan) 120 MG 24 hr capsule Take 1 capsule (120 mg total) by mouth in the morning 100 capsule 1     No current facility-administered medications on file prior to visit.       Objective   Vitals: Blood pressure 142/90, pulse 94, height 5' 5\" (1.651 m), weight 130 kg (287 lb).,Body mass index is 47.76 kg/m².    PE:  AAOx 3  WDWN  Hearing intact, no drainage from eyes  Regular rate  no audible wheezing  no abdominal distension  LE compartments soft, skin intact    bilateralknee:    Appearance:  No  swelling   No  ecchymosis  No  obvious joint deformity   No  effusion   Palpation/Tenderness:  Yes  TTP over medial joint line  Yes  TTP over lateral joint line   No  TTP over patella  No  TTP over patellar tendon  No  TTP over pes anserine bursa  Active Range of Motion:  AROM: 3-115  Special Tests:  Valgus Stress Test: negative  Varus Stress Test: negative    Large joint arthrocentesis: bilateral knee  Universal Protocol:  procedure performed by consultantConsent: Verbal consent obtained.  Risks " and benefits: risks, benefits and alternatives were discussed  Consent given by: patient  Timeout called at: 9/6/2024 3:31 PM.  Patient understanding: patient states understanding of the procedure being performed  Patient identity confirmed: verbally with patient  Supporting Documentation  Indications: pain and diagnostic evaluation   Procedure Details  Location: knee - bilateral knee  Preparation: Patient was prepped and draped in the usual sterile fashion  Needle size: 22 G  Ultrasound guidance: no  Approach: anterolateral    Medications (Right): 2 mL ropivacaine 0.2 %; 40 mg triamcinolone acetonide 40 mg/mLMedications (Left): 2 mL ropivacaine 0.2 %; 20 mg triamcinolone acetonide 40 mg/mL   Patient tolerance: patient tolerated the procedure well with no immediate complications  Dressing:  Sterile dressing applied            Scribe Attestation      I,:  Kell Roberts am acting as a scribe while in the presence of the attending physician.:       I,:  Anamika Ramesh DO personally performed the services described in this documentation    as scribed in my presence.:

## 2024-09-19 ENCOUNTER — TELEPHONE (OUTPATIENT)
Age: 49
End: 2024-09-19

## 2024-09-19 NOTE — TELEPHONE ENCOUNTER
Chart reviewed  [facility]   There is active approval on file good through 2/20/25    Called [facility] pharmacy, spoke to Ashley. Confirmed that there are refills on file. She will have it ready for this pt.     Pt made aware of all of the below. She verbalized understanding and appreciative.

## 2024-09-19 NOTE — TELEPHONE ENCOUNTER
Patient called in and is requesting a new script for the following medications that she needs a refill for her migraines.     rimegepant sulfate (NURTEC) 75 mg TBDP [[id number]]    Order Details  Dose, Route, Frequency: As Directed   Dispense Quantity: 16 tablet Refills: 6          Sig: TAKE ONE TABLET UNDER TONGUE AT ONSET. LIMIT 1 TABLET IN 24 HOURS         Start Date: 05/06/24 End Date: --   Written Date: 05/06/24 Expiration Date: 05/06/25     Please call patient once this is sent to the pharmacy so she can  medication.     Phone # [phone]

## 2024-09-20 ENCOUNTER — TELEPHONE (OUTPATIENT)
Dept: NEUROLOGY | Facility: CLINIC | Age: 49
End: 2024-09-20

## 2024-09-20 NOTE — TELEPHONE ENCOUNTER
Chart reviewed  There is active PA on file good through 2/20/25 ([employer]). See enc 2/20/24    PA initiated on CMM. (Key: [id number]) ([employer])  Patient Inactive     Called [facility], spoke to Andree and states that [employer] coverage has been terminated as of   8/31/24.     Called pt re: above. Pt states that she has new [facility] ID [id number], but she does not have the pharmacy benefit info. Can call [phone].    Called [phone], spoke to Shahana re: above. Confirmed that pt has pharmacy benefit. rxbin [id number]  [facility] [id number], ID [id number].   I was not able to locate PA form on CMM w/ the info above. I was transferred to another dept to initiate PA over the phone. Spoke to Ev. PA started over the phone.   -Nurtec 75 mg tbdp  -16 tabs per 30 days  -dx G43.109  -acute tx   -Ideally would like to avoid triptans due history of multiple intracranial aneurysms     Case # [id number]    72 hr turnaround time     Awaiting determination

## 2024-09-23 ENCOUNTER — TELEPHONE (OUTPATIENT)
Dept: FAMILY MEDICINE CLINIC | Facility: CLINIC | Age: 49
End: 2024-09-23

## 2024-09-23 ENCOUNTER — HOSPITAL ENCOUNTER (OUTPATIENT)
Dept: RADIOLOGY | Facility: HOSPITAL | Age: 49
Discharge: HOME | End: 2024-09-23
Payer: COMMERCIAL

## 2024-09-23 ENCOUNTER — OFFICE VISIT (OUTPATIENT)
Dept: FAMILY MEDICINE CLINIC | Facility: CLINIC | Age: 49
End: 2024-09-23
Payer: COMMERCIAL

## 2024-09-23 VITALS
WEIGHT: 282 LBS | HEIGHT: 65 IN | SYSTOLIC BLOOD PRESSURE: 114 MMHG | BODY MASS INDEX: 46.98 KG/M2 | HEART RATE: 87 BPM | OXYGEN SATURATION: 94 % | DIASTOLIC BLOOD PRESSURE: 80 MMHG

## 2024-09-23 DIAGNOSIS — E11.9 TYPE 2 DIABETES MELLITUS WITHOUT COMPLICATION, WITHOUT LONG-TERM CURRENT USE OF INSULIN (HCC): ICD-10-CM

## 2024-09-23 DIAGNOSIS — K21.9 GASTROESOPHAGEAL REFLUX DISEASE WITHOUT ESOPHAGITIS: ICD-10-CM

## 2024-09-23 DIAGNOSIS — E55.9 VITAMIN D DEFICIENCY: ICD-10-CM

## 2024-09-23 DIAGNOSIS — I10 BENIGN ESSENTIAL HYPERTENSION: Primary | ICD-10-CM

## 2024-09-23 DIAGNOSIS — J40 BRONCHITIS: ICD-10-CM

## 2024-09-23 PROCEDURE — 99214 OFFICE O/P EST MOD 30 MIN: CPT | Performed by: PHYSICIAN ASSISTANT

## 2024-09-23 PROCEDURE — 71046 X-RAY EXAM CHEST 2 VIEWS: CPT

## 2024-09-23 RX ORDER — ALBUTEROL SULFATE 90 UG/1
2 INHALANT RESPIRATORY (INHALATION) EVERY 6 HOURS PRN
Qty: 6.7 G | Refills: 5 | Status: SHIPPED | OUTPATIENT
Start: 2024-09-23

## 2024-09-23 RX ORDER — SULFAMETHOXAZOLE/TRIMETHOPRIM 800-160 MG
1 TABLET ORAL EVERY 12 HOURS SCHEDULED
Qty: 20 TABLET | Refills: 0 | Status: SHIPPED | OUTPATIENT
Start: 2024-09-23 | End: 2024-10-03

## 2024-09-23 RX ORDER — DEXTROMETHORPHAN HYDROBROMIDE AND PROMETHAZINE HYDROCHLORIDE 15; 6.25 MG/5ML; MG/5ML
5 SYRUP ORAL 4 TIMES DAILY PRN
Qty: 120 ML | Refills: 0 | Status: SHIPPED | OUTPATIENT
Start: 2024-09-23

## 2024-09-23 NOTE — PROGRESS NOTES
Ambulatory Visit  Name: Jamaica Arrington      : 1975      MRN: [id number]  Encounter Provider: Jamie Rizo PA-C  Encounter Date: 2024   Encounter department: [facility] PRIMARY CARE  Patient Instructions       Assessment/plan:  #1.  Acute bronchitis-recommend starting Bactrim DS, 1 tablet twice daily for 10 days.  Recommend promethazine DM cough syrup, and albuterol HFA, 2 puffs every 4-6 hours as needed for cough or shortness of breath.  If symptoms are not improving in the next 1 or 2 days or they worsen would recommend chest x-ray.  Order placed.  2.  Type 2 diabetes-hemoglobin A1c has gone up to 7.4.  She has been diet controlled to this point.  Patient has been able to lose about 11 pounds over the past 6 weeks however.  She would like to continue with dietary efforts and reassess labs in 6 months.  3.  Benign essential hypertension-stable on losartan 100 mg and chlorthalidone 12.5 mg  4.  Vitamin D deficiency-stable, no medication changes.  5.  GERD-stable on omeprazole 40 mg daily, no medication changes.    Assessment & Plan  Benign essential hypertension    Orders:    Albumin / creatinine urine ratio; Future    Comprehensive metabolic panel; Future    Hemoglobin A1C; Future    CBC and differential; Future    Lipid Panel with Direct LDL reflex; Future    TSH, 3rd generation with Free T4 reflex; Future    Type 2 diabetes mellitus without complication, without long-term current use of insulin ([facility])    Lab Results   Component Value Date    HGBA1C 7.4 (H) 2024       Orders:    Albumin / creatinine urine ratio; Future    Comprehensive metabolic panel; Future    Hemoglobin A1C; Future    CBC and differential; Future    Lipid Panel with Direct LDL reflex; Future    TSH, 3rd generation with Free T4 reflex; Future    Vitamin D deficiency    Orders:    Albumin / creatinine urine ratio; Future    Comprehensive metabolic panel; Future    Hemoglobin A1C; Future    CBC and differential;  Future    Lipid Panel with Direct LDL reflex; Future    TSH, 3rd generation with Free T4 reflex; Future    Gastroesophageal reflux disease without esophagitis    Orders:    Albumin / creatinine urine ratio; Future    Comprehensive metabolic panel; Future    Hemoglobin A1C; Future    CBC and differential; Future    Lipid Panel with Direct LDL reflex; Future    TSH, 3rd generation with Free T4 reflex; Future    Bronchitis    Orders:    sulfamethoxazole-trimethoprim (BACTRIM DS) 800-160 mg per tablet; Take 1 tablet by mouth every 12 (twelve) hours for 10 days    promethazine-dextromethorphan (PHENERGAN-DM) 6.25-15 mg/5 mL oral syrup; Take 5 mL by mouth 4 (four) times a day as needed for cough    XR chest pa and lateral; Future    albuterol (Proventil HFA) 90 mcg/act inhaler; Inhale 2 puffs every 6 (six) hours as needed for wheezing       History of Present Illness     HPI: This is a 49-year-old female who presents to the office for follow-up of recent labs but more importantly she started with cough and significant chest congestion in the past 5 days.  She has been coughing with thick mucus.  She has been feeling fatigued and rundown.  She has not had relief with over-the-counter medications so far.  With regard to her diabetes she has been diet controlled to this point but her labs were completed in late September and show that her A1c was going up to 7.4.  She has been doing a lot of walking and dietary efforts and is down 11 pounds over the past 6 weeks.          Review of Systems   Constitutional:  Positive for activity change and fatigue. Negative for fever.   HENT:  Positive for congestion, postnasal drip, rhinorrhea, sinus pressure and sore throat. Negative for ear pain.    Respiratory:  Positive for cough and wheezing. Negative for chest tightness and shortness of breath.    Cardiovascular:  Negative for palpitations.   Gastrointestinal:  Negative for diarrhea and nausea.   Musculoskeletal:  Positive for  "myalgias. Negative for arthralgias.   Skin:  Negative for rash.   Neurological:  Negative for dizziness and numbness.   All other systems reviewed and are negative.          Objective     /80 (BP Location: Left arm, Patient Position: Sitting, Cuff Size: Standard)   Pulse 87   Ht 5' 5\" (1.651 m)   Wt 128 kg (282 lb)   LMP  (LMP Unknown)   SpO2 94%   BMI 46.93 kg/m²     Physical Exam  Vitals and nursing note reviewed.   Constitutional:       General: She is not in acute distress.     Appearance: She is well-developed.   HENT:      Head: Normocephalic and atraumatic.      Mouth/Throat:      Pharynx: No oropharyngeal exudate.   Eyes:      General:         Right eye: No discharge.         Left eye: No discharge.      Pupils: Pupils are equal, round, and reactive to light.   Cardiovascular:      Rate and Rhythm: Normal rate and regular rhythm.      Heart sounds: Normal heart sounds. No murmur heard.     No friction rub.   Pulmonary:      Effort: Pulmonary effort is normal. No respiratory distress.      Breath sounds: Wheezing present. No rales.      Comments: Harsh crackles and wheezes bilateral lung fields.  Musculoskeletal:         General: Normal range of motion.      Cervical back: Neck supple.   Lymphadenopathy:      Cervical: Cervical adenopathy present.   Skin:     General: Skin is warm and dry.      Findings: No erythema.   Neurological:      Mental Status: She is alert and oriented to person, place, and time.   Psychiatric:         Behavior: Behavior normal.         "

## 2024-09-23 NOTE — ASSESSMENT & PLAN NOTE
Lab Results   Component Value Date    HGBA1C 7.4 (H) 08/27/2024       Orders:    Albumin / creatinine urine ratio; Future    Comprehensive metabolic panel; Future    Hemoglobin A1C; Future    CBC and differential; Future    Lipid Panel with Direct LDL reflex; Future    TSH, 3rd generation with Free T4 reflex; Future

## 2024-09-23 NOTE — TELEPHONE ENCOUNTER
Patient was seen today and was placed a stat x ray order. I advise patient she has to get it done today or tomorrow patient states she doesn't think she can do it today or tomorrow.

## 2024-09-23 NOTE — PATIENT INSTRUCTIONS
Assessment/plan:  #1.  Acute bronchitis-recommend starting Bactrim DS, 1 tablet twice daily for 10 days.  Recommend promethazine DM cough syrup, and albuterol HFA, 2 puffs every 4-6 hours as needed for cough or shortness of breath.  If symptoms are not improving in the next 1 or 2 days or they worsen would recommend chest x-ray.  Order placed.  2.  Type 2 diabetes-hemoglobin A1c has gone up to 7.4.  She has been diet controlled to this point.  Patient has been able to lose about 11 pounds over the past 6 weeks however.  She would like to continue with dietary efforts and reassess labs in 6 months.  3.  Benign essential hypertension-stable on losartan 100 mg and chlorthalidone 12.5 mg  4.  Vitamin D deficiency-stable, no medication changes.  5.  GERD-stable on omeprazole 40 mg daily, no medication changes.

## 2024-09-23 NOTE — ASSESSMENT & PLAN NOTE
Orders:    Albumin / creatinine urine ratio; Future    Comprehensive metabolic panel; Future    Hemoglobin A1C; Future    CBC and differential; Future    Lipid Panel with Direct LDL reflex; Future    TSH, 3rd generation with Free T4 reflex; Future

## 2024-09-24 ENCOUNTER — TELEPHONE (OUTPATIENT)
Dept: FAMILY MEDICINE CLINIC | Facility: CLINIC | Age: 49
End: 2024-09-24

## 2024-09-24 NOTE — TELEPHONE ENCOUNTER
----- Message from Jamie Rizo PA-C sent at 9/23/2024  7:58 PM EDT -----  Chest x-ray is normal.  No evidence of pneumonia.  Symptoms still likely consistent with bronchitis and would recommend continuing treatment plan as discussed at office visit.

## 2024-09-26 NOTE — TELEPHONE ENCOUNTER
nurtec approved from 9/20/24-indefinite  8 tabs per 30 days    Left message for pharm making them aware of approval and pt's new insurance info

## 2024-10-10 DIAGNOSIS — R60.9 EDEMA, UNSPECIFIED TYPE: ICD-10-CM

## 2024-10-10 DIAGNOSIS — I10 BENIGN ESSENTIAL HYPERTENSION: ICD-10-CM

## 2024-10-10 DIAGNOSIS — E87.6 HYPOKALEMIA: ICD-10-CM

## 2024-10-11 DIAGNOSIS — I10 BENIGN ESSENTIAL HYPERTENSION: ICD-10-CM

## 2024-10-11 RX ORDER — FUROSEMIDE 20 MG
20 TABLET ORAL 2 TIMES DAILY
Qty: 180 TABLET | Refills: 1 | Status: SHIPPED | OUTPATIENT
Start: 2024-10-11

## 2024-10-11 RX ORDER — LOSARTAN POTASSIUM 100 MG/1
100 TABLET ORAL DAILY
Qty: 90 TABLET | Refills: 1 | Status: SHIPPED | OUTPATIENT
Start: 2024-10-11 | End: 2024-10-11

## 2024-10-11 RX ORDER — LOSARTAN POTASSIUM 100 MG/1
100 TABLET ORAL DAILY
Qty: 90 TABLET | Refills: 1 | Status: SHIPPED | OUTPATIENT
Start: 2024-10-11

## 2024-10-18 ENCOUNTER — OFFICE VISIT (OUTPATIENT)
Dept: FAMILY MEDICINE CLINIC | Facility: CLINIC | Age: 49
End: 2024-10-18
Payer: COMMERCIAL

## 2024-10-18 ENCOUNTER — TELEPHONE (OUTPATIENT)
Age: 49
End: 2024-10-18

## 2024-10-18 VITALS
DIASTOLIC BLOOD PRESSURE: 82 MMHG | HEIGHT: 65 IN | BODY MASS INDEX: 47.15 KG/M2 | WEIGHT: 283 LBS | OXYGEN SATURATION: 96 % | HEART RATE: 85 BPM | RESPIRATION RATE: 18 BRPM | TEMPERATURE: 97.5 F | SYSTOLIC BLOOD PRESSURE: 124 MMHG

## 2024-10-18 DIAGNOSIS — I10 BENIGN ESSENTIAL HYPERTENSION: ICD-10-CM

## 2024-10-18 DIAGNOSIS — M19.90 OSTEOARTHRITIS, UNSPECIFIED OSTEOARTHRITIS TYPE, UNSPECIFIED SITE: ICD-10-CM

## 2024-10-18 DIAGNOSIS — E87.6 HYPOKALEMIA: ICD-10-CM

## 2024-10-18 DIAGNOSIS — E11.9 TYPE 2 DIABETES MELLITUS WITHOUT COMPLICATION, WITHOUT LONG-TERM CURRENT USE OF INSULIN (HCC): Primary | ICD-10-CM

## 2024-10-18 DIAGNOSIS — Z12.31 ENCOUNTER FOR SCREENING MAMMOGRAM FOR MALIGNANT NEOPLASM OF BREAST: ICD-10-CM

## 2024-10-18 DIAGNOSIS — R60.9 EDEMA, UNSPECIFIED TYPE: ICD-10-CM

## 2024-10-18 DIAGNOSIS — I67.1 CEREBRAL ANEURYSM, NONRUPTURED: ICD-10-CM

## 2024-10-18 DIAGNOSIS — M17.0 BILATERAL PRIMARY OSTEOARTHRITIS OF KNEE: Primary | ICD-10-CM

## 2024-10-18 PROCEDURE — 99214 OFFICE O/P EST MOD 30 MIN: CPT | Performed by: PHYSICIAN ASSISTANT

## 2024-10-18 RX ORDER — DICLOFENAC SODIUM 75 MG/1
75 TABLET, DELAYED RELEASE ORAL 2 TIMES DAILY PRN
Qty: 60 TABLET | Refills: 1 | Status: SHIPPED | OUTPATIENT
Start: 2024-10-18

## 2024-10-18 RX ORDER — POTASSIUM CHLORIDE 750 MG/1
10 TABLET, EXTENDED RELEASE ORAL 2 TIMES DAILY
Qty: 30 TABLET | Refills: 2 | Status: SHIPPED | OUTPATIENT
Start: 2024-10-18

## 2024-10-18 NOTE — TELEPHONE ENCOUNTER
Caller: [place]     Doctor: Gadiel    Reason for call:Patient is experiencing bilateral knee pain. She is not eligible for cortisone or visco for a few more months. Patient is requesting pain medication to be sent to her [facility] pharmacy. Please advise.    Call back#: [phone]

## 2024-10-18 NOTE — TELEPHONE ENCOUNTER
Spoke with patient. Notes bilateral knee pain. She does not have any diclofenac which was provided previously. [facility]. Med warnings reviewed. She may also take tylenol as well. Patient verbalized understanding and had no other questions at this time.

## 2024-10-18 NOTE — PATIENT INSTRUCTIONS
Assessment/plan:  1.  Osteoarthritis-patient is having difficulty ambulating greater than 200 feet as required for some positions at her job.  Between her osteoarthritis of the low back and knees as well as morbid obesity it would be best if she is in a more stationary position that did not require heavy lifting or frequent bending at the waist.  Accommodation forms were completed.  2.  Hypertension stable with current regimen, no medication changes.  3.  Cerebral aneurysm-status post treatment with neurosurgery.  Patient does continue with chronic headaches.  Would try to avoid limiting bending at the waist frequently.  4.  Morbid obesity-BMI of 47-patient does have significant shortness of breath with exertion and difficulty with pain in the back and knees.  She also has mild asthma which is exacerbated by symptoms at times.  5.  Mild intermittent asthma-continue albuterol HFA as necessary.

## 2024-10-18 NOTE — ASSESSMENT & PLAN NOTE
Orders:    potassium chloride (Klor-Con M10) 10 mEq tablet; Take 1 tablet (10 mEq total) by mouth 2 (two) times a day

## 2024-10-18 NOTE — PROGRESS NOTES
Ambulatory Visit  Name: Jamaica Arrington      : 1975      MRN: [id number]  Encounter Provider: Jamie Rizo PA-C  Encounter Date: 10/18/2024   Encounter department: [facility] PRIMARY CARE  Patient Instructions           Assessment/plan:  1.  Osteoarthritis-patient is having difficulty ambulating greater than 200 feet as required for some positions at her job.  Between her osteoarthritis of the low back and knees as well as morbid obesity it would be best if she is in a more stationary position that did not require heavy lifting or frequent bending at the waist.  Accommodation forms were completed.  2.  Hypertension stable with current regimen, no medication changes.  3.  Cerebral aneurysm-status post treatment with neurosurgery.  Patient does continue with chronic headaches.  Would try to avoid limiting bending at the waist frequently.  4.  Morbid obesity-BMI of 47-patient does have significant shortness of breath with exertion and difficulty with pain in the back and knees.  She also has mild asthma which is exacerbated by symptoms at times.  5.  Mild intermittent asthma-continue albuterol HFA as necessary.    Assessment & Plan  Type 2 diabetes mellitus without complication, without long-term current use of insulin ([facility])    Lab Results   Component Value Date    HGBA1C 7.4 (H) 2024            Benign essential hypertension         Cerebral aneurysm, nonruptured         Edema, unspecified type    Orders:    potassium chloride (Klor-Con M10) 10 mEq tablet; Take 1 tablet (10 mEq total) by mouth 2 (two) times a day    Hypokalemia    Orders:    potassium chloride (Klor-Con M10) 10 mEq tablet; Take 1 tablet (10 mEq total) by mouth 2 (two) times a day    Encounter for screening mammogram for malignant neoplasm of breast    Orders:    Mammo screening bilateral w 3d and cad; Future    Osteoarthritis, unspecified osteoarthritis type, unspecified site            History of Present Illness     HPI:  "This is a 49-year-old female who presents to the office with a history of morbid obesity, osteoarthritis of the low back and knees, and history of cerebral aneurysm with hypertension and mild intermittent asthma.  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  She does have difficulty because of obesity and arthritis of the low back and knees.  She does ambulate with a single-point cane most of the time.  She also has history of cerebral aneurysm and hypertension.  She gets frequent headaches and has difficulty bending at the waist often.  She is present today for work accommodation papers to be filled out.          Review of Systems   Constitutional:  Negative for appetite change and fever.   Respiratory:  Positive for shortness of breath. Negative for chest tightness.    Cardiovascular:  Negative for chest pain, palpitations and leg swelling.   Gastrointestinal:  Negative for abdominal pain.   Genitourinary:  Negative for difficulty urinating.   Musculoskeletal:  Positive for arthralgias, back pain, gait problem and myalgias.   Skin:  Negative for wound.   Neurological:  Negative for dizziness, light-headedness and headaches.   Psychiatric/Behavioral:  Negative for dysphoric mood and sleep disturbance. The patient is not nervous/anxious.            Objective     /82 (BP Location: Left arm, Patient Position: Sitting, Cuff Size: Adult)   Pulse 85   Temp 97.5 °F (36.4 °C) (Temporal)   Resp 18   Ht 5' 5\" (1.651 m)   Wt 128 kg (283 lb)   LMP  (LMP Unknown)   SpO2 96%   BMI 47.09 kg/m²     Physical Exam  Constitutional:       General: She is not in acute distress.     Appearance: She is obese.      Comments: Ambulates with a single-point " cane.   HENT:      Head: Normocephalic and atraumatic.      Right Ear: Tympanic membrane normal.      Left Ear: Tympanic membrane normal.      Nose: No congestion or rhinorrhea.   Eyes:      Conjunctiva/sclera: Conjunctivae normal.      Pupils: Pupils are equal, round, and reactive to light.   Neck:      Vascular: No carotid bruit.   Cardiovascular:      Rate and Rhythm: Normal rate and regular rhythm.      Heart sounds: No murmur heard.  Pulmonary:      Effort: Pulmonary effort is normal. No respiratory distress.      Breath sounds: Normal breath sounds.   Abdominal:      Palpations: Abdomen is soft.   Musculoskeletal:         General: Normal range of motion.      Cervical back: Normal range of motion and neck supple. No muscular tenderness.   Lymphadenopathy:      Cervical: No cervical adenopathy.   Skin:     General: Skin is warm.      Capillary Refill: Capillary refill takes less than 2 seconds.   Neurological:      General: No focal deficit present.      Mental Status: She is alert and oriented to person, place, and time.   Psychiatric:         Mood and Affect: Mood normal.

## 2024-10-23 DIAGNOSIS — M54.50 ACUTE BILATERAL LOW BACK PAIN WITHOUT SCIATICA: ICD-10-CM

## 2024-11-04 ENCOUNTER — ANNUAL EXAM (OUTPATIENT)
Dept: OBGYN CLINIC | Facility: CLINIC | Age: 49
End: 2024-11-04
Payer: COMMERCIAL

## 2024-11-04 VITALS
WEIGHT: 272.8 LBS | BODY MASS INDEX: 45.45 KG/M2 | HEIGHT: 65 IN | DIASTOLIC BLOOD PRESSURE: 78 MMHG | SYSTOLIC BLOOD PRESSURE: 124 MMHG

## 2024-11-04 DIAGNOSIS — Z12.31 SCREENING MAMMOGRAM FOR BREAST CANCER: ICD-10-CM

## 2024-11-04 DIAGNOSIS — Z01.411 ENCOUNTER FOR GYNECOLOGICAL EXAMINATION WITH ABNORMAL FINDING: Primary | ICD-10-CM

## 2024-11-04 DIAGNOSIS — N90.7 VULVAR CYST: ICD-10-CM

## 2024-11-04 PROCEDURE — 99396 PREV VISIT EST AGE 40-64: CPT | Performed by: NURSE PRACTITIONER

## 2024-11-04 NOTE — PATIENT INSTRUCTIONS
"Patient Education     Diet and health   The Basics   Written by the doctors and editors at [facility]   Why is it important to eat a healthy diet? -- It's important to eat a healthy diet because eating the right foods can keep you healthy now and later in life. It can lower the risk of problems like heart disease, diabetes, high blood pressure, and some types of cancer. It can also help you live longer and improve your quality of life.  What kind of diet is best? -- There is no 1 specific diet that experts recommend for everyone. People choose what foods to eat for many different reasons. These include personal preference, culture, Worship, allergies or intolerances, and nutritional goals. People also need to consider the cost and availability of different foods.  In general, experts recommend a diet that:   Includes lots of vegetables, fruits, beans, nuts, and whole grains   Limits red and processed meats, unhealthy fats, sugar, salt, and alcohol  What are dietary patterns? -- A dietary \"pattern\" means generally eating certain types of foods while limiting others. Some people need to follow a specific dietary pattern because of their health needs. For example, if you have high blood pressure, your doctor might recommend a diet low in salt.  If you are trying to improve your health in general, choosing a healthy dietary pattern can help. This does not have to mean being very strict about what you eat or avoid. The goal is to think about getting plenty of healthy foods while limiting less healthy foods.  Examples of dietary patterns include:   Mediterranean diet - This involves eating a lot of fruits, vegetables, nuts, and whole grains, and uses olive oil instead of other fats. It also includes some fish, poultry, and dairy products, but not a lot of red meat. Following this diet can help your overall health, and might even lower your risk of having a stroke.   Plant-based diets - These patterns focus on vegetables, " "fruits, grains, beans, and nuts. They limit or avoid food that comes from animals, such as meat and dairy. There are different types of plant-based diets, including vegetarian and vegan.   Low-fat diet - A low-fat diet involves limiting calories from fat. This might help some people keep weight off if that is their goal, but it does not have many other health benefits. If you choose to follow a low-fat diet, it is also important to focus on getting lots of whole grains, legumes, fruits, and vegetables. Limit refined grains and sugar.   Low-cholesterol diet - Cholesterol is found in foods with a lot of saturated fat, like red meat, butter, and cheese. A low-cholesterol diet focuses on limiting the amount of cholesterol that you eat. Limiting the cholesterol in your diet can also help lower the amount of unhealthy fats that you eat.  Which foods are especially healthy? -- Foods that are especially healthy include:   Fruits and vegetables - Eating a diet with lots of fruits and vegetables can help prevent heart disease and stroke. It might also help prevent certain types of cancer. Try to eat fruits and vegetables at each meal and also for snacks. If you don't have fresh fruits and vegetables available, you can eat frozen or canned ones instead. Doctors recommend eating at least 5 servings of fruits or vegetables each day.   Whole grains - Whole-grain foods include 100 percent whole-wheat bread, steel cut oats, and whole-grain pasta. These are healthier than foods made with \"refined\" grains, like white bread and white rice. Eating lots of whole grains instead of refined grains has been shown to help with weight control. It can also lower the risk of several health problems, including colon cancer, heart disease, and diabetes. Doctors recommend that most people try to eat 5 to 8 servings of whole-grain, high-fiber foods each day.   Foods with fiber - Eating foods with a lot of fiber can help prevent heart disease and " "stroke. If you have type 2 diabetes, it can also help control your blood sugar. Foods that have a lot of fiber include vegetables, fruits, beans, nuts, oatmeal, and whole-grain breads and cereals. You can tell how much fiber is in a food by reading the nutrition label (figure 1). Doctors recommend that most people eat about 25 to 34 grams of fiber each day.   Foods with calcium and vitamin D - Babies, children, and adults need calcium and vitamin D to help keep their bones strong. Adults also need calcium and vitamin D to help prevent osteoporosis. Osteoporosis is a condition that causes bones to get \"thin\" and break more easily than usual. Different foods and drinks have calcium and vitamin D in them (figure 2). People who don't get enough calcium and vitamin D in their diet might need to take a supplement. Doctors recommend that most people have 2 to 3 servings of foods with calcium and vitamin D each day.   Foods with protein - Protein helps your muscles and bones stay strong. Healthy foods with a lot of protein include chicken, fish, eggs, beans, nuts, and soy products. Red meat also has a lot of protein, but it also contains fats, which can be unhealthy. Doctors recommend that most people try to eat about 5 servings of protein each day.   Healthy fats - There are different types of fats. Some types of fats are better for your body than others. Healthy fats are \"monounsaturated\" or \"polyunsaturated\" fats. These are found in fatty fish, nuts and nut butters, and avocados. Use plant-based oils when cooking. Examples of these oils include olive, canola, safflower, sunflower, and corn oil. Eating foods with healthy fats, while avoiding or limiting foods with unhealthy fats, might lower the risk of heart disease.   Foods with folate - Folate is a vitamin that is important for pregnant people, since it helps prevent certain birth defects. It is also called \"folic acid.\" Anyone who could get pregnant should get at " "least 400 micrograms of folic acid daily, whether or not they are actively trying to get pregnant. Folate is found in many breakfast cereals, oranges, orange juice, and green leafy vegetables.  What foods should I avoid or limit? -- To eat a healthy diet, there are some things that you should avoid or limit. They include:   Unhealthy fats - \"Trans\" fats are especially unhealthy. They are found in margarines, many fast foods, and some store-bought baked goods. \"Saturated\" fats are found in animal products like meats, egg yolks, butter, cheese, and full-fat milk products. Unhealthy fats can raise your cholesterol level and increase your chance of getting heart disease.   Sugar - To have a healthy diet, it's important to limit or avoid added sugar, sweets, and refined grains. Refined grains are found in white bread, white rice, most pastas, and most packaged \"snack\" foods.  Avoiding sugar-sweetened beverages, like soda and sports drinks, can also help improve your health.  Avoid canned fruits in \"heavy\" syrup.   Red and processed meats - Studies have shown that eating a lot of red meat can increase your risk of certain health problems, including heart disease and cancer. You should limit the amount of red meat that you eat. This is also true for processed meats like sausage, hot dogs, and patel.  Can I drink alcohol as part of a healthy diet? -- Not drinking alcohol at all is the healthiest choice. Regular drinking can raise a person's chances of getting liver disease and certain types of cancers. In females, even 1 drink a day can increase the risk of getting breast cancer.  If you do choose to drink, most doctors recommend limiting alcohol to no more than:   1 drink a day for females   2 drinks a day for males  The limits are different because, generally, the female body takes longer to break down alcohol.  How many calories do I need each day? -- Calories give your body energy. The number of calories that you need " "each day depends on your weight, height, age, sex, and how active you are.  Your doctor or nurse can tell you about how many calories you should eat each day. You can also work with a dietitian (nutrition expert) to learn more about your dietary needs and options.  What if I am having trouble improving my diet? -- It can be hard to change the way that you eat. Remember that even small changes can improve your health.  Here are some tips that might help:   Try to make fruits and vegetables part of every meal. If you don't have fresh fruits and vegetables, frozen or canned are good options. Look for products without added salt or sugar.   Keep a bowl of fruit out for snacking.   When you can, choose whole grains instead of refined grains. Choose chicken, fish, and beans instead of red meat and cheese.   Try to eat prepared and processed foods less often.   Try flavored seltzer or water instead of soda or juice.   When eating at fast food restaurants, look for healthier items, like broiled chicken or salad.  If you have questions about which foods you should or should not eat, ask your doctor, nurse, or dietitian. The right diet for you will depend, in part, on your health and any medical conditions you have.  All topics are updated as new evidence becomes available and our peer review process is complete.  This topic retrieved from [employer] on: Feb 28, 2024.  Topic 94497 Version 28.0  Release: 32.2.4 - C32.58  © 2024 UpToDate, Inc. and/or its affiliates. All rights reserved.  figure 1: Nutrition label - Fiber     This is an example of a nutrition label. To figure out how much fiber is in a food, look for the line that says \"Dietary Fiber.\" It's also important to look at the serving size. This food has 7 grams of fiber in each serving, and each serving is 1 cup.  Graphic 47276 Version 8.0  figure 2: Foods and drinks with calcium and vitamin D     Foods rich in calcium include ice cream, soy milk, breads, kale, " "broccoli, milk, cheese, cottage cheese, almonds, yogurt, ready-to-eat cereals, beans, and tofu. Foods rich in vitamin D include milk, fortified plant-based \"milks\" (soy, almond), canned tuna fish, cod liver oil, yogurt, ready-to-eat-cereals, cooked salmon, canned sardines, mackerel, and eggs. Some of these foods are rich in both.  Graphic 94865 Version 4.0  Consumer Information Use and Disclaimer   Disclaimer: This generalized information is a limited summary of diagnosis, treatment, and/or medication information. It is not meant to be comprehensive and should be used as a tool to help the user understand and/or assess potential diagnostic and treatment options. It does NOT include all information about conditions, treatments, medications, side effects, or risks that may apply to a specific patient. It is not intended to be medical advice or a substitute for the medical advice, diagnosis, or treatment of a health care provider based on the health care provider's examination and assessment of a patient's specific and unique circumstances. Patients must speak with a health care provider for complete information about their health, medical questions, and treatment options, including any risks or benefits regarding use of medications. This information does not endorse any treatments or medications as safe, effective, or approved for treating a specific patient. UpToDate, Inc. and its affiliates disclaim any warranty or liability relating to this information or the use thereof.The use of this information is governed by the Terms of Use, available at https://www.wolterskluwer.com/en/know/clinical-effectiveness-terms. 2024© UpToDate, Inc. and its affiliates and/or licensors. All rights reserved.  Copyright   © 2024 UpToDate, Inc. and/or its affiliates. All rights reserved.  Patient Education     Calcium Rich Diet   About this topic   Calcium is one of the most important minerals and is found in almost all parts of your " body. It makes your teeth and bones strong and healthy. Your body does not make calcium. It is important for you to eat calcium rich foods to get enough calcium. It also helps your body:  Make blood clots  Keep your heartbeat normal  Helps blood move throughout the body  Release hormones  Release enzymes  Send and get signals between your nerves and brain  Make muscles work well         What will the results be?   It is important to have a good amount of calcium in your food. Calcium helps your body work well. It is very important during every life stage. Calcium may also keep you from losing bone mass. This is osteopenia. It may help keep you from having weak bones. This is osteoporosis.  What drugs may be needed?   The doctor may order calcium and vitamin D supplements. You need vitamin D to help your body take in the calcium. Your calcium supplement may have vitamin D in it.  Talk to your doctor about:  If it is OK to take your calcium with food.  How often to take your calcium supplement throughout the day.  All the drugs you are taking. Be sure to include all prescription and over-the-counter (OTC) drugs, and herbal supplements. Tell the doctor about any drug allergy. Bring a list of drugs you take with you. Some drugs may cause problems with how your body takes in calcium.  Will physical activity be limited?   No, physical activities will not be limited. It is good for you to do light exercises and to stay active.  What changes to diet are needed?   You will need to watch how much calcium is in the foods you eat. Your doctor will talk to you about the right amount of calcium for you. How much calcium you need is based on your age and life stage.  When is this diet used?   This diet is used when your normal diet is low in calcium. It is needed to raise the amount of calcium in your diet. Our bodies do not take in calcium well as we get older.  Who should not use this diet?   People with too much calcium in  their blood should not use this diet. This is called hypercalcemia.  What foods are good to eat?   Milk, yogurt, and cheese products are naturally high in calcium.  These foods have smaller amounts of calcium. If they are eaten often and in large amounts they can be good sources of calcium:  Oysters; dried fruit like figs and raisins; green leafy vegetables like broccoli, collards, kale, mustard greens, bok erika; soy beans; oranges  Martin and sardines. Be sure to eat the soft bones.  Nuts like almonds and [place] nuts, sunflower seeds, tahini, dried beans  Food products with calcium added to them like orange juice, tofu, cereal, bread; almond milk, rice milk, soy milk  What foods should be limited or avoided?   People who eat many kinds of foods do not have to be worried about limiting or avoiding certain foods.   What problems could happen?   Some people may get kidney stones. This may happen after taking high amounts of calcium over a long time. Calcium from food does not seem to cause kidney stones.  Hard stools.  Too much calcium may interfere with your body’s ability to absorb iron and zinc.  When do I need to call the doctor?   Call your doctor if you have concerns about your diet. Tell your doctor if you are allergic to any of the foods in your diet.  Helpful tips   If you have problems digesting milk, try lactose-free milk. You may also use a product to help you take in lactose.  Talk with your doctor if you are a vegetarian and do not eat dairy products. Your doctor can help you make sure you get the amount of calcium your body needs.  Last Reviewed Date   2021-03-12  Consumer Information Use and Disclaimer   This generalized information is a limited summary of diagnosis, treatment, and/or medication information. It is not meant to be comprehensive and should be used as a tool to help the user understand and/or assess potential diagnostic and treatment options. It does NOT include all information about  conditions, treatments, medications, side effects, or risks that may apply to a specific patient. It is not intended to be medical advice or a substitute for the medical advice, diagnosis, or treatment of a health care provider based on the health care provider's examination and assessment of a patient’s specific and unique circumstances. Patients must speak with a health care provider for complete information about their health, medical questions, and treatment options, including any risks or benefits regarding use of medications. This information does not endorse any treatments or medications as safe, effective, or approved for treating a specific patient. UpToDate, Inc. and its affiliates disclaim any warranty or liability relating to this information or the use thereof. The use of this information is governed by the Terms of Use, available at https://www.MindSnacks.com/en/know/clinical-effectiveness-terms   Copyright   Copyright © 2024 UpToDate, Inc. and its affiliates and/or licensors. All rights reserved.  Patient Education     Exercise and movement   The Basics   Written by the doctors and editors at [employer]   What are the benefits of movement? -- Moving your body has many benefits. It can:   Burn calories, which helps people manage their weight   Help control blood sugar levels in people with diabetes   Lower blood pressure, especially in people with high blood pressure   Lower stress, and help with depression and anxiety   Keep bones strong, so they don't get thin and break easily   Lower the chance of dying from heart disease  Adding even small amounts of physical activity to your daily routine can improve your health.  What are the main types of exercise? -- There are 3 main types of exercise:   Aerobic exercise - This raises your heart rate. Examples include walking, running, dancing, riding a bike, and swimming.   Muscle strengthening - This helps make your muscles stronger. You can do it using  weights, exercise bands, or weight machines. You can also use your own body weight, as with push-ups, or by lifting items in your home, like jugs of water.   Stretching - These help your muscles and joints move more easily.  It's important to have all 3 types in your exercise program. That way, your body, muscles, and joints can be as healthy as possible.  Should I talk to my doctor or nurse before I start exercising? -- If you have not exercised before or have not exercised in a long time, talk with your doctor or nurse before you start a very active exercise program.  If you have heart disease or risk factors for heart disease (like high blood pressure or diabetes), your doctor or nurse might recommend that you have an exercise test before starting an exercise program.  When you begin an exercise program, start slowly. For example, do the exercise at a slow pace or for a few minutes only. Over time, you can exercise faster and for longer periods of time.  What should I do when I exercise? -- Each time you exercise, you should:   Warm up - This can help keep you from hurting your muscles when you exercise. To warm up, do a light aerobic exercise (such as walking slowly) or stretch for 5 to 10 minutes.   Work out - Try to get a mix of aerobic exercise, muscle strengthening, and stretching. During an aerobic workout, you can walk fast, swim, run, or use an exercise machine, for example. Other activities, like dancing or playing tennis, are also forms of aerobic exercise. You should also take time to stretch all of your joints, including your neck, shoulders, back, hips, and knees. At least 2 times a week, you can do muscle strengthening exercises as part of your workout.   Cool down - This helps keep you from feeling dizzy after you exercise and helps prevent muscle cramps. To cool down, you can stretch or do a light aerobic exercise for 5 minutes.  Some people go to a gym or do group exercise classes. But you can  exercise even without these things. Some exercises can be done even in a small space. You can also try online videos or smartphone apps to get ideas for different types of exercise.  How often should I exercise? -- Doctors recommend that people exercise at least 30 minutes a day, on 5 or more days of the week.  If you can't exercise for 30 minutes straight, try to exercise for 10 minutes at a time, 3 or 4 times a day. Even exercising for shorter amounts of time is good for you, especially if it means spending less time sitting.  When should I call my doctor or nurse? -- If you have any of the following symptoms when you exercise, stop exercising and call your doctor or nurse right away:   Pain or pressure in your chest, arms, throat, jaw, or back   Nausea or vomiting   Feeling like your heart is fluttering or racing very fast   Feeling dizzy or faint  What if I don't have time to exercise? -- Many people have very busy lives and might not think that they have time to exercise. But it's important to try to find time, even if you are tired or work a lot. Exercise can increase your energy level, which can make you feel better and might even help you get more work done.  Even if it's hard to set aside a lot of time to exercise, you can still improve your health by moving your body more. There are many ways that you can be more active. For example, you can:   Take the stairs instead of the elevator.   Park in a parking space that is farther away from the door.   Take a longer route when you walk from one place to another.  Spending a lot of time sitting still (for example, watching TV or working on the computer) is bad for your health. Try to get up and move around whenever you can. Even small amounts of movement, like taking short walks, doing household chores, or gardening, can improve your health. Finding activities that you enjoy, or doing them with other people, can help you add more movement into your daily  life.  What else should I do when I exercise? -- To exercise safely and avoid problems, it's important to:   Drink fluids during and after exercising (but avoid drinks with a lot of caffeine or sugar).   Avoid exercising outside if it is too hot or cold.   Wear layers of clothes, so that you can take them off if you get too hot.   Wear shoes that fit well and support your feet.   Be aware of your surroundings if you exercise outside.  All topics are updated as new evidence becomes available and our peer review process is complete.  This topic retrieved from [employer] on: May 18, 2024.  Topic 50915 Version 31.0  Release: 32.4.3 - C32.137  © 2024 UpToDate, Inc. and/or its affiliates. All rights reserved.  Consumer Information Use and Disclaimer   Disclaimer: This generalized information is a limited summary of diagnosis, treatment, and/or medication information. It is not meant to be comprehensive and should be used as a tool to help the user understand and/or assess potential diagnostic and treatment options. It does NOT include all information about conditions, treatments, medications, side effects, or risks that may apply to a specific patient. It is not intended to be medical advice or a substitute for the medical advice, diagnosis, or treatment of a health care provider based on the health care provider's examination and assessment of a patient's specific and unique circumstances. Patients must speak with a health care provider for complete information about their health, medical questions, and treatment options, including any risks or benefits regarding use of medications. This information does not endorse any treatments or medications as safe, effective, or approved for treating a specific patient. UpToDate, Inc. and its affiliates disclaim any warranty or liability relating to this information or the use thereof.The use of this information is governed by the Terms of Use, available at  https://www.woltersSlidebeanuwer.com/en/know/clinical-effectiveness-terms. 2024© [employer] and its affiliates and/or licensors. All rights reserved.  Copyright   © 2024 [employer] and/or its affiliates. All rights reserved.

## 2024-11-04 NOTE — PROGRESS NOTES
Assessment / Plan    Assessment & Plan  Encounter for gynecological examination with abnormal finding  Well woman exam.  Post benign hysterectomy.  Cervical cancer screening is no longer indicated.  Up to date on colonoscopy        Screening mammogram for breast cancer  Has order and appointment.       Vulvar cyst  Sebaceous, causing patient discomfort.  RV for I&D           Subjective      Jamaica Arrington is a 49 y.o. female who presents for her annual gynecologic exam.    50 yo G0    Reports still having boil in her genital area.  States that it gets irritated and is uncomfortable.     Hx of hysterectomy, 2020, AUB  Last pap: pre-2020  Pap hx: NL  STD hx: none  11/2023 mammo negative; scheduled 12/20/24 12/2022 Colonoscopy, polypectomies x2; rpt 5 yrs     Sexually active: not currently, last time was years ago  BC method: hysterectomy  Calcium intake: 1-2 servings per day; guidelines given.  Exercise: no; guidelines given  Safety: feels safe     Periods are absent  Current contraception: status post hysterectomy  History of abnormal Pap smear: no  Family history of breast,uterine, ovarian or colon cancer: yes - mat aunt breast ca       The following portions of the patient's history were reviewed and updated as appropriate: allergies, current medications, past family history, past medical history, past social history, past surgical history, and problem list.    Review of Systems      Review of Systems   Constitutional:  Negative for chills and fever.   Respiratory:  Negative for cough and shortness of breath.    Gastrointestinal:  Negative for abdominal distention, abdominal pain, blood in stool, constipation, diarrhea, nausea and vomiting.   Genitourinary:  Positive for genital sores (right labial cyst). Negative for difficulty urinating, dysuria, frequency, hematuria, menstrual problem, pelvic pain, urgency, vaginal bleeding and vaginal discharge.   Musculoskeletal:  Negative for arthralgias and myalgias.  "    Breasts:  Negative for skin changes, dimpling, asymmetry, nipple discharge, redness, tenderness or palpable masses    Objective     *patient agrees to exam without a chaperone present.     /78 (BP Location: Left arm, Patient Position: Sitting, Cuff Size: Large)   Ht 5' 5\" (1.651 m)   Wt 124 kg (272 lb 12.8 oz)   LMP  (LMP Unknown)   BMI 45.40 kg/m²   Physical Exam  Constitutional:       General: She is not in acute distress.     Appearance: Normal appearance. She is well-developed. She is not ill-appearing or diaphoretic.      Comments: Body mass index is 45.4 kg/m².     HENT:      Head: Normocephalic and atraumatic.   Eyes:      Pupils: Pupils are equal, round, and reactive to light.   Neck:      Thyroid: No thyromegaly.   Pulmonary:      Effort: Pulmonary effort is normal.   Chest:   Breasts:     Breasts are symmetrical.      Right: No inverted nipple, mass, nipple discharge, skin change or tenderness.      Left: No inverted nipple, mass, nipple discharge, skin change or tenderness.   Abdominal:      General: There is no distension.      Palpations: Abdomen is soft. There is no mass.      Tenderness: There is no abdominal tenderness. There is no guarding or rebound.   Genitourinary:     General: Normal vulva.      Exam position: Lithotomy position.      Labia:         Right: No rash, tenderness, lesion or injury.         Left: No rash, tenderness, lesion or injury.       Vagina: No signs of injury and foreign body. No vaginal discharge, erythema, tenderness or bleeding.      Cervix: No cervical motion tenderness, discharge or friability.      Uterus: Not enlarged and not tender.       Adnexa:         Right: No mass or tenderness.          Left: No mass or tenderness.            Comments: 1 cm sebaceous cyst  Musculoskeletal:      Cervical back: Neck supple.   Lymphadenopathy:      Cervical: No cervical adenopathy.      Upper Body:      Right upper body: No supraclavicular adenopathy.      Left upper " body: No supraclavicular adenopathy.   Skin:     General: Skin is warm and dry.   Neurological:      General: No focal deficit present.      Mental Status: She is alert and oriented to person, place, and time.   Psychiatric:         Mood and Affect: Mood normal.         Behavior: Behavior normal.         Thought Content: Thought content normal.         Judgment: Judgment normal.

## 2024-11-15 DIAGNOSIS — R60.9 EDEMA, UNSPECIFIED TYPE: ICD-10-CM

## 2024-11-15 DIAGNOSIS — E87.6 HYPOKALEMIA: ICD-10-CM

## 2024-11-15 RX ORDER — FUROSEMIDE 20 MG/1
20 TABLET ORAL 2 TIMES DAILY
Qty: 180 TABLET | Refills: 1 | Status: SHIPPED | OUTPATIENT
Start: 2024-11-15

## 2024-11-26 ENCOUNTER — PROCEDURE VISIT (OUTPATIENT)
Dept: OBGYN CLINIC | Facility: CLINIC | Age: 49
End: 2024-11-26

## 2024-11-26 VITALS
WEIGHT: 270 LBS | HEART RATE: 64 BPM | BODY MASS INDEX: 44.98 KG/M2 | OXYGEN SATURATION: 100 % | DIASTOLIC BLOOD PRESSURE: 88 MMHG | SYSTOLIC BLOOD PRESSURE: 134 MMHG | HEIGHT: 65 IN

## 2024-11-26 DIAGNOSIS — N90.7 VULVAR CYST: Primary | ICD-10-CM

## 2024-11-26 NOTE — PROGRESS NOTES
Incision and drain    Date/Time: 11/26/2024 4:15 PM    Performed by: HARSHAD Gayle  Authorized by: HARSHAD Gayle  Universal Protocol:  Consent: Written consent obtained.  Risks and benefits: risks, benefits and alternatives were discussed  Consent given by: patient  Timeout called at: 11/26/2024 3:41 PM.  Patient understanding: patient states understanding of the procedure being performed  Patient consent: the patient's understanding of the procedure matches consent given  Procedure consent: procedure consent matches procedure scheduled  Site marked: the operative site was marked  Patient identity confirmed: verbally with patient    Patient location:  Other (comment)  Location:     Indications for incision and drainage: sebaceous cyst.    Size:  1 cm    Location:  Anogenital    Anogenital location:  Vulva  Pre-procedure details:     Skin preparation:  Betadine  Anesthesia (see MAR for exact dosages):     Anesthesia method:  Topical application  Procedure details:     Complexity:  Simple    Needle aspiration: no      Incision types:  Stab incision    Scalpel blade:  15    Approach:  Puncture    Incision depth:  Subcutaneous    Drainage:  Purulent    Drainage amount:  Moderate    Wound treatment:  Wound left open  Post-procedure details:     Patient tolerance of procedure:  Tolerated well, no immediate complications

## 2024-12-02 DIAGNOSIS — I10 BENIGN ESSENTIAL HYPERTENSION: ICD-10-CM

## 2024-12-03 ENCOUNTER — APPOINTMENT (OUTPATIENT)
Dept: LAB | Facility: CLINIC | Age: 49
End: 2024-12-03
Payer: COMMERCIAL

## 2024-12-03 DIAGNOSIS — K21.9 GASTROESOPHAGEAL REFLUX DISEASE WITHOUT ESOPHAGITIS: ICD-10-CM

## 2024-12-03 DIAGNOSIS — E11.9 TYPE 2 DIABETES MELLITUS WITHOUT COMPLICATION, WITHOUT LONG-TERM CURRENT USE OF INSULIN (HCC): ICD-10-CM

## 2024-12-03 DIAGNOSIS — I10 BENIGN ESSENTIAL HYPERTENSION: ICD-10-CM

## 2024-12-03 DIAGNOSIS — E55.9 VITAMIN D DEFICIENCY: ICD-10-CM

## 2024-12-03 LAB
ALBUMIN SERPL BCG-MCNC: 4.1 G/DL (ref 3.5–5)
ALP SERPL-CCNC: 71 U/L (ref 34–104)
ALT SERPL W P-5'-P-CCNC: 18 U/L (ref 7–52)
ANION GAP SERPL CALCULATED.3IONS-SCNC: 12 MMOL/L (ref 4–13)
AST SERPL W P-5'-P-CCNC: 21 U/L (ref 13–39)
BASOPHILS # BLD AUTO: 0.03 THOUSANDS/ΜL (ref 0–0.1)
BASOPHILS NFR BLD AUTO: 0 % (ref 0–1)
BILIRUB SERPL-MCNC: 0.51 MG/DL (ref 0.2–1)
BUN SERPL-MCNC: 14 MG/DL (ref 5–25)
CALCIUM SERPL-MCNC: 9.6 MG/DL (ref 8.4–10.2)
CHLORIDE SERPL-SCNC: 101 MMOL/L (ref 96–108)
CHOLEST SERPL-MCNC: 164 MG/DL (ref ?–200)
CO2 SERPL-SCNC: 28 MMOL/L (ref 21–32)
CREAT SERPL-MCNC: 0.71 MG/DL (ref 0.6–1.3)
CREAT UR-MCNC: 175.3 MG/DL
EOSINOPHIL # BLD AUTO: 0.14 THOUSAND/ΜL (ref 0–0.61)
EOSINOPHIL NFR BLD AUTO: 2 % (ref 0–6)
ERYTHROCYTE [DISTWIDTH] IN BLOOD BY AUTOMATED COUNT: 17.4 % (ref 11.6–15.1)
EST. AVERAGE GLUCOSE BLD GHB EST-MCNC: 146 MG/DL
GFR SERPL CREATININE-BSD FRML MDRD: 101 ML/MIN/1.73SQ M
GLUCOSE P FAST SERPL-MCNC: 105 MG/DL (ref 65–99)
HBA1C MFR BLD: 6.7 %
HCT VFR BLD AUTO: 38.7 % (ref 34.8–46.1)
HDLC SERPL-MCNC: 46 MG/DL
HGB BLD-MCNC: 11.6 G/DL (ref 11.5–15.4)
IMM GRANULOCYTES # BLD AUTO: 0.03 THOUSAND/UL (ref 0–0.2)
IMM GRANULOCYTES NFR BLD AUTO: 0 % (ref 0–2)
LDLC SERPL CALC-MCNC: 95 MG/DL (ref 0–100)
LYMPHOCYTES # BLD AUTO: 2 THOUSANDS/ΜL (ref 0.6–4.47)
LYMPHOCYTES NFR BLD AUTO: 29 % (ref 14–44)
MCH RBC QN AUTO: 22.8 PG (ref 26.8–34.3)
MCHC RBC AUTO-ENTMCNC: 30 G/DL (ref 31.4–37.4)
MCV RBC AUTO: 76 FL (ref 82–98)
MICROALBUMIN UR-MCNC: 22.5 MG/L
MICROALBUMIN/CREAT 24H UR: 13 MG/G CREATININE (ref 0–30)
MONOCYTES # BLD AUTO: 0.37 THOUSAND/ΜL (ref 0.17–1.22)
MONOCYTES NFR BLD AUTO: 5 % (ref 4–12)
NEUTROPHILS # BLD AUTO: 4.32 THOUSANDS/ΜL (ref 1.85–7.62)
NEUTS SEG NFR BLD AUTO: 64 % (ref 43–75)
NRBC BLD AUTO-RTO: 0 /100 WBCS
PLATELET # BLD AUTO: 284 THOUSANDS/UL (ref 149–390)
PMV BLD AUTO: 10.9 FL (ref 8.9–12.7)
POTASSIUM SERPL-SCNC: 3.7 MMOL/L (ref 3.5–5.3)
PROT SERPL-MCNC: 7.4 G/DL (ref 6.4–8.4)
RBC # BLD AUTO: 5.08 MILLION/UL (ref 3.81–5.12)
SODIUM SERPL-SCNC: 141 MMOL/L (ref 135–147)
TRIGL SERPL-MCNC: 117 MG/DL (ref ?–150)
TSH SERPL DL<=0.05 MIU/L-ACNC: 2.8 UIU/ML (ref 0.45–4.5)
WBC # BLD AUTO: 6.89 THOUSAND/UL (ref 4.31–10.16)

## 2024-12-03 PROCEDURE — 83036 HEMOGLOBIN GLYCOSYLATED A1C: CPT

## 2024-12-03 PROCEDURE — 80053 COMPREHEN METABOLIC PANEL: CPT

## 2024-12-03 PROCEDURE — 36415 COLL VENOUS BLD VENIPUNCTURE: CPT

## 2024-12-03 PROCEDURE — 80061 LIPID PANEL: CPT

## 2024-12-03 PROCEDURE — 85025 COMPLETE CBC W/AUTO DIFF WBC: CPT

## 2024-12-03 PROCEDURE — 82043 UR ALBUMIN QUANTITATIVE: CPT

## 2024-12-03 PROCEDURE — 84443 ASSAY THYROID STIM HORMONE: CPT

## 2024-12-03 PROCEDURE — 82570 ASSAY OF URINE CREATININE: CPT

## 2024-12-03 RX ORDER — CHLORTHALIDONE 25 MG/1
TABLET ORAL
Qty: 45 TABLET | Refills: 1 | Status: SHIPPED | OUTPATIENT
Start: 2024-12-03

## 2024-12-04 ENCOUNTER — RESULTS FOLLOW-UP (OUTPATIENT)
Dept: FAMILY MEDICINE CLINIC | Facility: CLINIC | Age: 49
End: 2024-12-04

## 2024-12-04 ENCOUNTER — TELEPHONE (OUTPATIENT)
Dept: NEUROLOGY | Facility: CLINIC | Age: 49
End: 2024-12-04

## 2024-12-23 ENCOUNTER — TELEPHONE (OUTPATIENT)
Age: 49
End: 2024-12-23

## 2024-12-23 NOTE — TELEPHONE ENCOUNTER
Patient called stating she wanted a message passed along to Dr Rizo, stated that back on 10/18 she had some accomodation forms filled out for her job but unfortunately even with the forms the job didn't have a position that was fitting so she was let go . She just wanted Dr Rizo to know this .

## 2025-01-15 ENCOUNTER — TELEPHONE (OUTPATIENT)
Dept: NEUROLOGY | Facility: CLINIC | Age: 50
End: 2025-01-15

## 2025-01-15 NOTE — TELEPHONE ENCOUNTER
Spoke to pt and confirmed their  4p  appt on   1/20  w/ Qureshi  . Reminded pt to arrive 15 mins prior to appt to check in with .

## 2025-01-17 ENCOUNTER — TELEPHONE (OUTPATIENT)
Dept: FAMILY MEDICINE CLINIC | Facility: CLINIC | Age: 50
End: 2025-01-17

## 2025-01-17 DIAGNOSIS — E87.6 HYPOKALEMIA: ICD-10-CM

## 2025-01-17 DIAGNOSIS — E55.9 VITAMIN D DEFICIENCY: ICD-10-CM

## 2025-01-17 DIAGNOSIS — R60.9 EDEMA, UNSPECIFIED TYPE: ICD-10-CM

## 2025-01-17 RX ORDER — POTASSIUM CHLORIDE 750 MG/1
10 TABLET, EXTENDED RELEASE ORAL 2 TIMES DAILY
Qty: 30 TABLET | Refills: 5 | Status: SHIPPED | OUTPATIENT
Start: 2025-01-17 | End: 2025-01-17 | Stop reason: SDUPTHER

## 2025-01-17 RX ORDER — POTASSIUM CHLORIDE 750 MG/1
10 TABLET, EXTENDED RELEASE ORAL 2 TIMES DAILY
Qty: 30 TABLET | Refills: 0 | OUTPATIENT
Start: 2025-01-17

## 2025-01-17 RX ORDER — POTASSIUM CHLORIDE 750 MG/1
10 TABLET, EXTENDED RELEASE ORAL 2 TIMES DAILY
Qty: 180 TABLET | Refills: 1 | Status: SHIPPED | OUTPATIENT
Start: 2025-01-17

## 2025-01-17 NOTE — TELEPHONE ENCOUNTER
Pt out of meds. Pt staying at [facility] until we can get Chris to sign off on potassium chloride. He is in the room with his last pt. Delicia garcia.

## 2025-01-17 NOTE — TELEPHONE ENCOUNTER
Patient currently at pharmacy, needs her potassium chloride filled. Please send to pharmacy on file

## 2025-01-20 ENCOUNTER — TELEPHONE (OUTPATIENT)
Age: 50
End: 2025-01-20

## 2025-01-20 ENCOUNTER — TELEMEDICINE (OUTPATIENT)
Dept: NEUROLOGY | Facility: CLINIC | Age: 50
End: 2025-01-20
Payer: COMMERCIAL

## 2025-01-20 ENCOUNTER — HOSPITAL ENCOUNTER (OUTPATIENT)
Dept: CT IMAGING | Facility: HOSPITAL | Age: 50
Discharge: HOME | End: 2025-01-20
Payer: COMMERCIAL

## 2025-01-20 DIAGNOSIS — I67.1 CEREBRAL ANEURYSM, NONRUPTURED: ICD-10-CM

## 2025-01-20 DIAGNOSIS — F41.9 ANXIETY AND DEPRESSION: ICD-10-CM

## 2025-01-20 DIAGNOSIS — G47.00 INSOMNIA, UNSPECIFIED TYPE: ICD-10-CM

## 2025-01-20 DIAGNOSIS — G43.109 MIGRAINE WITH AURA AND WITHOUT STATUS MIGRAINOSUS, NOT INTRACTABLE: Primary | ICD-10-CM

## 2025-01-20 DIAGNOSIS — F32.A ANXIETY AND DEPRESSION: ICD-10-CM

## 2025-01-20 DIAGNOSIS — E66.01 MORBID OBESITY (HCC): ICD-10-CM

## 2025-01-20 PROCEDURE — 99214 OFFICE O/P EST MOD 30 MIN: CPT | Performed by: STUDENT IN AN ORGANIZED HEALTH CARE EDUCATION/TRAINING PROGRAM

## 2025-01-20 PROCEDURE — G2211 COMPLEX E/M VISIT ADD ON: HCPCS | Performed by: STUDENT IN AN ORGANIZED HEALTH CARE EDUCATION/TRAINING PROGRAM

## 2025-01-20 PROCEDURE — G1004 CDSM NDSC: HCPCS

## 2025-01-20 PROCEDURE — 70496 CT ANGIOGRAPHY HEAD: CPT

## 2025-01-20 RX ORDER — TOPIRAMATE 25 MG/1
TABLET, FILM COATED ORAL
Qty: 120 TABLET | Refills: 5 | Status: SHIPPED | OUTPATIENT
Start: 2025-01-20

## 2025-01-20 RX ORDER — ACETAMINOPHEN 160 MG
2000 TABLET,DISINTEGRATING ORAL EVERY MORNING
Qty: 90 CAPSULE | Refills: 0 | Status: SHIPPED | OUTPATIENT
Start: 2025-01-20

## 2025-01-20 RX ORDER — AMITRIPTYLINE HYDROCHLORIDE 50 MG/1
50 TABLET ORAL
Qty: 90 TABLET | Refills: 3 | Status: SHIPPED | OUTPATIENT
Start: 2025-01-20

## 2025-01-20 RX ADMIN — IOHEXOL 85 ML: 350 INJECTION, SOLUTION INTRAVENOUS at 10:47

## 2025-01-20 NOTE — TELEPHONE ENCOUNTER
Patient is unable to get to the office on 1- at 4:00 pm , due to Weather issues and transport     Patient was offered a VV     Patient asked if they will be in the state the providers  license is in  and if they had any new or worsening symptom    Patient stated they will be in the state and no new or worsening symptoms     Appointment changed to a VV

## 2025-01-20 NOTE — PROGRESS NOTES
Neurology Ambulatory Visit  Name: Jamaica Arrington       : 1975       MRN: [id number]   Encounter Provider: Alexander Qureshi DO   Encounter Date: 2025  Encounter department: [facility] [place]    Administrative Statements   Encounter provider Alexander Qureshi DO    The Patient is located at Home and in the following state in which I hold an active license [place].    The patient was identified by name and date of birth. Jamaica Arrington was informed that this is a telemedicine visit and that the visit is being conducted through the Epic Embedded platform. She agrees to proceed..  My office door was closed. No one else was in the room.  She acknowledged consent and understanding of privacy and security of the video platform. The patient has agreed to participate and understands they can discontinue the visit at any time.    Jamaica Arrington is a delightful 49 y.o. female with a past medical history that includes hypertension, cerebral aneurysm, insomnia, morbid obesity here for f/u evaluation of headache.     Assessment and Plan  1. Migraine with aura and without status migrainosus, not intractable  Assessment & Plan:  At today's visit, she feels as though her headaches have recently worsened and attributes that to a variety of ongoing stressors in her life.  She has also been without Nurtec for a few months now, but it is unclear why as it appears to have been approved indefinitely.  I have sent a message to our nursing team to assist with this.  In terms of prevention, she continues with amitriptyline nightly and finds it to be significantly helpful with sleep.  She did not start Topamax and is unsure why so I once again put in a new prescription for that.  She will update me approximately 8 weeks after starting Topamax to let me know how she is doing.  Orders:  -     rimegepant sulfate (NURTEC) 75 mg TBDP; TAKE ONE TABLET UNDER TONGUE AT ONSET. LIMIT 1 TABLET IN 24 HOURS  -     topiramate  (TOPAMAX) 25 mg tablet; 1 tab PO QHS for 1 week, increase as tolerated to 1 tab BID for 1 week, then 1 tab QAM and 2 tabs QHS for 1 week and finish at 2 tabs BID.  -     amitriptyline (ELAVIL) 50 mg tablet; Take 1 tablet (50 mg total) by mouth daily at bedtime  2. Insomnia, unspecified type  -     amitriptyline (ELAVIL) 50 mg tablet; Take 1 tablet (50 mg total) by mouth daily at bedtime  3. Anxiety and depression  4. Morbid obesity (HCC)    She will Return in about 6 months (around 7/20/2025).    Workup  - Neurologic assessment reveals unremarkable neurological exam  - With no new or concerning symptoms, no red flags and an unremarkable neurologic exam, there is no specific indication for further evaluation with MRI brain.  However, this could be obtained at any time if indicated.  - CTA head with and without contrast 10/25/2022: Postsurgical changes with left MCA aneurysm clip.  No acute findings.  Limited evaluation for residual/recurrent aneurysm. No stenosis.  - CTA head with and without contrast 1/24/2024: No significant change from prior exam.  Status post left MCA aneurysm clipping     Preventative:  - we discussed headache hygiene and lifestyle factors that may improve headaches  - Amitriptyline 50mg HS  - Add on Topamax with goal 50mg BID  - Currently on through other providers: Losartan, verapamil  - Past/ failed/contraindicated: None  - future options: SNRI, Memantine, Diamox, CGRP med, botox     Acute:  - discussed not taking over-the-counter or prescription pain medications more than 2-3 days per week to prevent medication overuse/rebound headache  - Nurtec 75mg oral dissolving tablet  - Currently on through other providers: Tizanidine  - Past/ failed/contraindicated: Ideally would like to avoid triptans due history of multiple intracranial aneurysms  - future options:  prochlorperazine, Toradol IM or p.o., could consider trial of 5 days of Depakote 500 mg nightly or dexamethasone 2 mg daily for  prolonged migraine, ubrelvy, reyvow,  zavzpret    History of Present Illness       Interval updates:  1/20/25: At today's visit, she feels as though her headaches have worsened.  She currently endorses about 10 headache days per month, and of those 2-3 can be more severe.  She is still taking amitriptyline nightly, but is not taking Topamax.  From an abortive standpoint, she is using Tylenol infrequently and was previously using Nurtec, but has not gotten it in months. With regards to sleep, she gets about 5-6 hours per night. She is well rested in the morning and feels that Amitriptyline has helped with falling asleep.    Prior History  3/7/24: At today's visit, she reports that her headaches have worsened.  She endorses daily headaches and is taking amitriptyline 50 mg at bedtime.  She is not able to take Nurtec at this time due to issues with cost and the inability to use a coupon card. Amitriptyline is helping with her sleep, but not so much with headache. She endorses a lot of other stressors in her life including losing her job. With regards to sleep, she gets about 7 hours per night. She has trouble staying asleep. Some days she does not feel refreshed.   6/6/23: Since her last visit, she feels that her headaches have worsened. Currently reports 10/30 headache days per month. She is taking Amitriptyline 30mg daily and reports that it is helpful, especially with sleep. She is unable to elaborate on why her headaches are worse. She has also been off of work as per her PCP and is scheduled to be off of work until August.   1/12/23: Ms. Arrington reports a longstanding history of migraines.  She states that she previously used to follow with neurology at [facility], but we will need to obtain those records.  She has a history of anxiety and depression as well as insomnia and was previously started on amitriptyline for treatment of those issues as well as headaches.  She states that she feels it helped a little  bit, but is currently on a low-dose of 20 mg nightly.  We decided that we would try increasing it to 30 mg and subsequently 40 mg to see if she has any better response.  From an abortive standpoint, she reports that Advil will take away the pain entirely in about 15 minutes.  Of note, she continues to follow with neurosurgery for known intracranial aneurysms, one of which was surgically clipped previously in 2018 at [facility].  In the future, we may need to consider obtaining a sleep study to rule out obstructive sleep apnea, which could certainly be contributing to a combination of her mood related issues and headaches.         Objective     Physical Exam:                                                                 Vitals:            Constitutional:    LMP  (LMP Unknown)   BP Readings from Last 3 Encounters:   11/26/24 134/88   11/04/24 124/78   10/18/24 124/82     Pulse Readings from Last 3 Encounters:   11/26/24 64   10/18/24 85   09/23/24 87         Well developed, well nourished, No dysmorphic features.       HEENT:  Normocephalic atraumatic. See neuro exam   Psychiatric:  Normal behavior and appropriate affect       Neurological Examination:     Mental status/cognitive function:   Recent and remote memory appear intact. Attention span and concentration as well as fund of knowledge appear appropriate for age. Normal language and spontaneous speech.  Cranial Nerves:  III, IV, VI-Pupils were equal, round. Extraocular movements appear full and conjugate   VII-facial expression symmetric  Motor Exam: symmetric bulk throughout. no atrophy, fasciculations or abnormal movements noted.   Coordination:  no apparent dysmetria, ataxia or tremor noted    I have spent 15 minutes with the patient today in which greater than 50% of this time was spent in counseling/coordination of care regarding Prognosis, Risks and benefits of tx options, Patient and family education, Importance of tx compliance, Impressions, Documenting in  the medical record, Reviewing / ordering tests, medicine, procedures  , and Obtaining or reviewing history  . I also spent 15 minutes non face to face for this patient the same day.     Activity Minutes   Precharting/reviewing 10   Patient care/counseling 15   Postcharting/care coordination 5       Voice recognition software was used in the generation of this note. There may be unintentional errors including grammatical errors, spelling errors, or pronoun errors.

## 2025-01-20 NOTE — PATIENT INSTRUCTIONS
Headache Calendar  Please maintain a headache calendar  Consider using phone applications such as Migraine BudFantasyHub or [facility] Migraine Tracker     Headache/migraine treatment:   Acute medications (for immediate treatment of a headache):   It is ok to take ibuprofen, acetaminophen or naproxen (Advil, Tylenol,  Aleve, Excedrin) if they help your headaches you should limit these to No more than 2-3 times a week to avoid medication overuse/rebound headaches.      Prescription Abortive  Nurtec 75mg oral dissolving tablet as needed. Max 1 per day     Prescription preventive medications for headaches/migraines   Amitriptyline - increase to 50mg nightly  Topiramate 25 mg nightly for 1 week, then increase to 25 mg in a.m. And 25 mg in p.m. For 1 week, then take 25 mg in a.m. And 50 mg in p.m. For 1 week, then take 50 mg in a.m. and 50 mg in p.m. And continue  - generally the common side effects improve as your body gets used to the medication.  If we need to spread out a more gradual increase of the medication on a longer scale we can, just call if any questions or concerns  - if necessary, if the a.m. dose is causing side effects we can always have you take the full dose at night instead    - important to know per data, this medication may, but typically does not affect birth control unless you are taking 200 mg daily or more and I highly recommend being on birth control while on this medication due to possible significant detrimental effects to fetus if you were to get pregnant     The medication you are taking may impact pregnancy. It has been associated with birth defects and learning problems if taken during pregnancy. Thus, it is important to avoid unplanned pregnancy while taking this medication. In the future, if you plan to become pregnant, then you should discuss this with your neurologist since medication adjustments may be indicated.     Lifestyle Recommendations:  [x] SLEEP - Maintain a regular sleep schedule:  Adults need at least 7-8 hours of uninterrupted a night. Maintain good sleep hygiene:  Going to bed and waking up at consistent times, avoiding excessive daytime naps, avoiding caffeinated beverages in the evening, avoid excessive stimulation in the evening and generally using bed primarily for sleeping.  One hour before bedtime would recommend turning lights down lower, decreasing your activity (may read quietly, listen to music at a low volume). When you get into bed, should eliminate all technology (no texting, emailing, playing with your phone, iPad or tablet in bed).  [x] HYDRATION - Maintain good hydration.  Drink  2L of fluid a day (4 typical small water bottles)  [x] DIET - Maintain good nutrition. In particular don't skip meals and try and eat healthy balanced meals regularly.  [x] TRIGGERS - Look for other triggers and avoid them: Limit caffeine to 1-2 cups a day or less. Avoid dietary triggers that you have noticed bring on your headaches (this could include aged cheese, peanuts, MSG, aspartame and nitrates).  [x] EXERCISE - physical exercise as we all know is good for you in many ways, and not only is good for your heart, but also is beneficial for your mental health, cognitive health and  chronic pain/headaches. I would encourage at the least 5 days of physical exercise weekly for at least 30 minutes.      Education and Follow-up  [x] Please call with any questions or concerns. Of course if any new concerning symptoms go to the emergency department.  [x] Follow up in 6 months

## 2025-01-20 NOTE — PROGRESS NOTES
Since your last visit are your headaches - Worsened    Any change to the headache type? No    What is your current headache frequency (total headache days out of 30): 10/30 (of those, how many are more severe: unsure)    Are you taking your current medications as prescribed? yes      Do you have any side effects? no    How may days per week do you take an abortive medicine? 2/7  Tylenol

## 2025-01-20 NOTE — TELEPHONE ENCOUNTER
Requested Prescriptions     Pending Prescriptions Disp Refills    Cholecalciferol (Vitamin D3) 50 MCG (2000 UT) capsule 90 capsule 0     Sig: Take 1 capsule (2,000 Units total) by mouth every morning      LOV 10/18/24 F/U 1/23/25 Labs completed

## 2025-01-20 NOTE — ASSESSMENT & PLAN NOTE
At today's visit, she feels as though her headaches have recently worsened and attributes that to a variety of ongoing stressors in her life.  She has also been without Nurtec for a few months now, but it is unclear why as it appears to have been approved indefinitely.  I have sent a message to our nursing team to assist with this.  In terms of prevention, she continues with amitriptyline nightly and finds it to be significantly helpful with sleep.  She did not start Topamax and is unsure why so I once again put in a new prescription for that.  She will update me approximately 8 weeks after starting Topamax to let me know how she is doing.

## 2025-01-20 NOTE — Clinical Note
Please assist patient with Nurtec. It appears to be approved indefinitely, but she has not received it in months

## 2025-01-21 ENCOUNTER — TELEPHONE (OUTPATIENT)
Dept: NEUROSURGERY | Facility: CLINIC | Age: 50
End: 2025-01-21

## 2025-01-21 ENCOUNTER — TELEPHONE (OUTPATIENT)
Age: 50
End: 2025-01-21

## 2025-01-21 NOTE — TELEPHONE ENCOUNTER
Per HARSHAD Check-Tri  request This RN phoned patient to check on status, review stoke like s/s, if patient is presenting with any of those s/s pt to report to ER, otherwise patient plan unchanged and will have apt on 2/5/2025 .      VM reached .  Message left for patient stating this RN name and the reason for the call being to confirm her upcoming appointment on 2/2 noting that we can see that she had completed her imaging and wanted to connect briefly regarding things.  Request for call back to [phone] option 3 for the clinical nurse line.      If no call back received this RN will attempt again.

## 2025-01-21 NOTE — TELEPHONE ENCOUNTER
1/21/25 received a call from Marvin in [facility] chat message sent to Leila to call Dr. Qureshi at Neurology at [phone].

## 2025-01-21 NOTE — TELEPHONE ENCOUNTER
Around 1015 this morning received a call from Dr. Qureshi radiologist in regards to patient CTA.  Hypodensity within the left greater than right anterior temporal lobes which could represent beam hardening artifact or possible infarct particularly on the left.  Status post left MCA aneurysm clipping with stable small outpouchings along the aneurysm clip suspicious for residual/recurrent aneurysm.    Lionel ALMANZA attempted to call patient but there was no response to see if patient had any new or worsening symptoms especially strokelike symptoms.  Unable to get a hold of patient but message was left.  She will contact patient again later this afternoon.    Patient has scheduled appointment with Dr. Alex on 2/5/2025.

## 2025-01-21 NOTE — TELEPHONE ENCOUNTER
2nd attempt to call patient, patient phoned at primary number, this attempt was successful.      Patient aware of her 2/5/2025 Apt and that we noted her imaging study had been done for this appointment. Patient denies any s/s of stroke, she is aware that she would need to f/u with ER assessment immediately should they arrise.     CRNP Check-Tri updated.

## 2025-01-23 ENCOUNTER — OFFICE VISIT (OUTPATIENT)
Dept: FAMILY MEDICINE CLINIC | Facility: CLINIC | Age: 50
End: 2025-01-23
Payer: COMMERCIAL

## 2025-01-23 VITALS
HEIGHT: 65 IN | HEART RATE: 89 BPM | DIASTOLIC BLOOD PRESSURE: 84 MMHG | WEIGHT: 265 LBS | BODY MASS INDEX: 44.15 KG/M2 | SYSTOLIC BLOOD PRESSURE: 132 MMHG | OXYGEN SATURATION: 99 %

## 2025-01-23 DIAGNOSIS — J45.21 MILD INTERMITTENT ASTHMA WITH ACUTE EXACERBATION: ICD-10-CM

## 2025-01-23 DIAGNOSIS — G43.109 MIGRAINE WITH AURA AND WITHOUT STATUS MIGRAINOSUS, NOT INTRACTABLE: ICD-10-CM

## 2025-01-23 DIAGNOSIS — E55.9 VITAMIN D DEFICIENCY: ICD-10-CM

## 2025-01-23 DIAGNOSIS — E66.01 MORBID OBESITY WITH BMI OF 40.0-44.9, ADULT (HCC): ICD-10-CM

## 2025-01-23 DIAGNOSIS — E11.9 TYPE 2 DIABETES MELLITUS WITHOUT COMPLICATION, WITHOUT LONG-TERM CURRENT USE OF INSULIN (HCC): Primary | ICD-10-CM

## 2025-01-23 DIAGNOSIS — I67.1 CEREBRAL ANEURYSM, NONRUPTURED: ICD-10-CM

## 2025-01-23 DIAGNOSIS — K59.00 CONSTIPATION, UNSPECIFIED CONSTIPATION TYPE: ICD-10-CM

## 2025-01-23 DIAGNOSIS — I10 BENIGN ESSENTIAL HYPERTENSION: ICD-10-CM

## 2025-01-23 PROCEDURE — 99214 OFFICE O/P EST MOD 30 MIN: CPT | Performed by: PHYSICIAN ASSISTANT

## 2025-01-23 RX ORDER — POLYETHYLENE GLYCOL 3350 17 G/17G
17 POWDER, FOR SOLUTION ORAL DAILY
Qty: 510 G | Refills: 2 | Status: SHIPPED | OUTPATIENT
Start: 2025-01-23

## 2025-01-23 NOTE — ASSESSMENT & PLAN NOTE
Stable, following with neurology, Dr. Qureshi.  Orders:  •  Albumin / creatinine urine ratio; Future  •  Comprehensive metabolic panel; Future  •  Hemoglobin A1C; Future  •  CBC and differential; Future  •  TSH, 3rd generation with Free T4 reflex; Future  •  Lipid Panel with Direct LDL reflex; Future

## 2025-01-23 NOTE — PROGRESS NOTES
Name: Jamaica Arrington      : 1975      MRN: [id number]  Encounter Provider: Jamie Rizo PA-C  Encounter Date: 2025   Encounter department: [facility] PRIMARY CARE  :  Assessment & Plan  Type 2 diabetes mellitus without complication, without long-term current use of insulin ([facility])    Lab Results   Component Value Date    HGBA1C 6.7 (H) 2024   Hemoglobin A1c of 6.7 is stable, currently on diet control.  Orders:  •  Albumin / creatinine urine ratio; Future  •  Comprehensive metabolic panel; Future  •  Hemoglobin A1C; Future  •  CBC and differential; Future  •  TSH, 3rd generation with Free T4 reflex; Future  •  Lipid Panel with Direct LDL reflex; Future    Mild intermittent asthma with acute exacerbation  Patient continues use of albuterol inhaler when necessary.  Orders:  •  Albumin / creatinine urine ratio; Future  •  Comprehensive metabolic panel; Future  •  Hemoglobin A1C; Future  •  CBC and differential; Future  •  TSH, 3rd generation with Free T4 reflex; Future  •  Lipid Panel with Direct LDL reflex; Future    Benign essential hypertension  Patient is stable on verapamil, chlorthalidone and losartan therapy  Orders:  •  Albumin / creatinine urine ratio; Future  •  Comprehensive metabolic panel; Future  •  Hemoglobin A1C; Future  •  CBC and differential; Future  •  TSH, 3rd generation with Free T4 reflex; Future  •  Lipid Panel with Direct LDL reflex; Future    Morbid obesity with BMI of 40.0-44.9, adult ([facility])  Weight loss reviewed with patient.  Continue healthy diet and exercise efforts.  Orders:  •  Albumin / creatinine urine ratio; Future  •  Comprehensive metabolic panel; Future  •  Hemoglobin A1C; Future  •  CBC and differential; Future  •  TSH, 3rd generation with Free T4 reflex; Future  •  Lipid Panel with Direct LDL reflex; Future    Cerebral aneurysm, nonruptured  Stable, patient has been following with neurosurgery, continue blood pressure management.  Orders:  •   Albumin / creatinine urine ratio; Future  •  Comprehensive metabolic panel; Future  •  Hemoglobin A1C; Future  •  CBC and differential; Future  •  TSH, 3rd generation with Free T4 reflex; Future  •  Lipid Panel with Direct LDL reflex; Future    Migraine with aura and without status migrainosus, not intractable  Stable, following with neurology, Dr. Quershi.  Orders:  •  Albumin / creatinine urine ratio; Future  •  Comprehensive metabolic panel; Future  •  Hemoglobin A1C; Future  •  CBC and differential; Future  •  TSH, 3rd generation with Free T4 reflex; Future  •  Lipid Panel with Direct LDL reflex; Future    Vitamin D deficiency    Orders:  •  Vitamin D 25 hydroxy; Future    Constipation, unspecified constipation type    Orders:  •  polyethylene glycol (GLYCOLAX) 17 GM/SCOOP powder; Take 17 g by mouth daily           History of Present Illness   HPI: This is a 49-year-old female who presents to the office for follow-up of chronic health conditions.  She does have history of type 2 diabetes and had blood work in December which she is here to follow-up with.  She also has history of cerebral aneurysm which has been clipped previously.  She does have recent CTA imaging that she is going to be following up with neurosurgery regarding.  She has not been having any problems with increased headache or visual changes.  She denies any weakness of the extremities or slurred speech.  She has been losing some weight and feeling a little bit better recently.  Labs do show that her sugars have been improving recently.  She does complain of some constipation from time to time recently.  She does not have anything to take for it.  She has little money to spend on over-the-counter medications but she may be able to get it if it is prescription.      Review of Systems   Constitutional:  Negative for chills, fatigue and fever.   HENT:  Negative for congestion, ear pain and sinus pressure.    Eyes:  Negative for visual disturbance.  "  Respiratory:  Negative for cough, chest tightness and shortness of breath.    Cardiovascular:  Negative for chest pain and palpitations.   Gastrointestinal:  Positive for constipation. Negative for diarrhea, nausea and vomiting.   Endocrine: Negative for polyuria.   Genitourinary:  Negative for dysuria and frequency.   Musculoskeletal:  Negative for arthralgias and myalgias.   Skin:  Negative for pallor and rash.   Neurological:  Negative for dizziness, weakness, light-headedness, numbness and headaches.   Psychiatric/Behavioral:  Negative for agitation, behavioral problems and sleep disturbance.    All other systems reviewed and are negative.      Objective   /84   Pulse 89   Ht 5' 5\" (1.651 m)   Wt 120 kg (265 lb)   LMP  (LMP Unknown)   SpO2 99%   BMI 44.10 kg/m²      Physical Exam  Constitutional:       General: She is not in acute distress.     Appearance: Normal appearance.   HENT:      Head: Normocephalic and atraumatic.      Right Ear: Tympanic membrane normal.      Left Ear: Tympanic membrane normal.      Nose: No congestion or rhinorrhea.   Eyes:      Conjunctiva/sclera: Conjunctivae normal.      Pupils: Pupils are equal, round, and reactive to light.   Neck:      Vascular: No carotid bruit.   Cardiovascular:      Rate and Rhythm: Normal rate and regular rhythm.      Heart sounds: No murmur heard.  Pulmonary:      Effort: Pulmonary effort is normal. No respiratory distress.      Breath sounds: Normal breath sounds.   Abdominal:      Palpations: Abdomen is soft.   Musculoskeletal:         General: Normal range of motion.      Cervical back: Normal range of motion and neck supple. No muscular tenderness.   Lymphadenopathy:      Cervical: No cervical adenopathy.   Skin:     General: Skin is warm.      Capillary Refill: Capillary refill takes less than 2 seconds.   Neurological:      General: No focal deficit present.      Mental Status: She is alert and oriented to person, place, and time. "   Psychiatric:         Mood and Affect: Mood normal.

## 2025-01-23 NOTE — ASSESSMENT & PLAN NOTE
Stable, patient has been following with neurosurgery, continue blood pressure management.  Orders:  •  Albumin / creatinine urine ratio; Future  •  Comprehensive metabolic panel; Future  •  Hemoglobin A1C; Future  •  CBC and differential; Future  •  TSH, 3rd generation with Free T4 reflex; Future  •  Lipid Panel with Direct LDL reflex; Future

## 2025-01-23 NOTE — ASSESSMENT & PLAN NOTE
Weight loss reviewed with patient.  Continue healthy diet and exercise efforts.  Orders:  •  Albumin / creatinine urine ratio; Future  •  Comprehensive metabolic panel; Future  •  Hemoglobin A1C; Future  •  CBC and differential; Future  •  TSH, 3rd generation with Free T4 reflex; Future  •  Lipid Panel with Direct LDL reflex; Future

## 2025-01-23 NOTE — ASSESSMENT & PLAN NOTE
Patient is stable on verapamil, chlorthalidone and losartan therapy  Orders:  •  Albumin / creatinine urine ratio; Future  •  Comprehensive metabolic panel; Future  •  Hemoglobin A1C; Future  •  CBC and differential; Future  •  TSH, 3rd generation with Free T4 reflex; Future  •  Lipid Panel with Direct LDL reflex; Future

## 2025-01-23 NOTE — ASSESSMENT & PLAN NOTE
Lab Results   Component Value Date    HGBA1C 6.7 (H) 12/03/2024   Hemoglobin A1c of 6.7 is stable, currently on diet control.  Orders:  •  Albumin / creatinine urine ratio; Future  •  Comprehensive metabolic panel; Future  •  Hemoglobin A1C; Future  •  CBC and differential; Future  •  TSH, 3rd generation with Free T4 reflex; Future  •  Lipid Panel with Direct LDL reflex; Future

## 2025-01-23 NOTE — ASSESSMENT & PLAN NOTE
Patient continues use of albuterol inhaler when necessary.  Orders:  •  Albumin / creatinine urine ratio; Future  •  Comprehensive metabolic panel; Future  •  Hemoglobin A1C; Future  •  CBC and differential; Future  •  TSH, 3rd generation with Free T4 reflex; Future  •  Lipid Panel with Direct LDL reflex; Future

## 2025-01-25 ENCOUNTER — TELEPHONE (OUTPATIENT)
Age: 50
End: 2025-01-25

## 2025-01-25 NOTE — TELEPHONE ENCOUNTER
Cc'd chart    DO MARCELLA [facility] Concussion & Migraine Team 5  Please assist patient with Nurtec. It appears to be approved indefinitely, but she has not received it in months    Received fax from [facility] PA rej luis  Key [id number]  Message from plan-  Patient Inactive     Chart reviewed  New ins on scanned in media. [employer]  Rxbin [id number]  Pcn [id number]  Id [id number]     [facility] PA initiated on CMM.  (Key: [id number])     Awaiting determination

## 2025-01-28 NOTE — TELEPHONE ENCOUNTER
Nurtec PA ([employer] [facility]) has been denied

## 2025-01-28 NOTE — TELEPHONE ENCOUNTER
Called [facility] pharmacy, spoke to Pelon and states that pt has express scripts. ID [id number].     Called express scripts at [facility] and advised of the below and above. Ali confirmed that pt has active pharmacy benefit through express scripts  Nurtec 75 mg tbdp PA initiated over the phone  -8 tabs per 30 days  -dx G43.109  -acute migraine tx  -avoid triptans due history of multiple intracranial aneurysms   Received immediate approval. Approved through 1/28/26  Test claim was processed successfully    Called [facility] pharmacy, spoke to Pelon and made aware of the approval. He got a paid claim and he will get it ready.

## 2025-01-31 ENCOUNTER — OFFICE VISIT (OUTPATIENT)
Dept: OBGYN CLINIC | Facility: MEDICAL CENTER | Age: 50
End: 2025-01-31
Payer: COMMERCIAL

## 2025-01-31 VITALS — HEIGHT: 65 IN | BODY MASS INDEX: 44.1 KG/M2

## 2025-01-31 DIAGNOSIS — M17.0 BILATERAL PRIMARY OSTEOARTHRITIS OF KNEE: Primary | ICD-10-CM

## 2025-01-31 DIAGNOSIS — M70.61 GREATER TROCHANTERIC BURSITIS OF RIGHT HIP: ICD-10-CM

## 2025-01-31 PROCEDURE — 20610 DRAIN/INJ JOINT/BURSA W/O US: CPT | Performed by: ORTHOPAEDIC SURGERY

## 2025-01-31 PROCEDURE — 99213 OFFICE O/P EST LOW 20 MIN: CPT | Performed by: ORTHOPAEDIC SURGERY

## 2025-01-31 RX ADMIN — TRIAMCINOLONE ACETONIDE 40 MG: 40 INJECTION, SUSPENSION INTRA-ARTICULAR; INTRAMUSCULAR at 15:15

## 2025-01-31 RX ADMIN — BUPIVACAINE HYDROCHLORIDE 2 ML: 2.5 INJECTION, SOLUTION INFILTRATION; PERINEURAL at 15:15

## 2025-01-31 NOTE — PROGRESS NOTES
Assessment & Plan     No diagnosis found.  Orders Placed This Encounter   Procedures    Large joint arthrocentesis: bilateral knee         Patient has severe bilateral knee osteoarthritis.   Non operative and operative treatment options were reviewed with patient today.   Patient is not a surgical candidate at this time. BMI 44.10. Goal weight 240 lb.  Injections: Patient received bilateral knee steroid injection today. Tolerated the procedure well. Post injection instructions reviewed including information on glucose monitoring for diabetic patients. Patient aware that they may repeat steroid injection every 3 months if needed.   Medications: Tylenol up to 3000 mg per day and Diclofenac 75 mg BID prn pain  PT: Continue home exercises.   Bracing: Continue short hinge knee brace as needed for comfort.   Activity: Continue activity as tolerated.   Patient also notes acute, atraumatic right ankle/distal calf pain. (-) hoffmans no history of DVT. She does have some bilateral edema for which she has recently been prescribed a water pill by her PCP. A referral to Dr Martinez to evaluate for tendonitis was provided.  Plan for next appt:  repeat CSI bilateral knee      Return in about 3 months (around 4/30/2025) for appt sooner for R hip if needed.    I answered all of the patient's questions during the visit and provided education of the patient's condition during the visit.  The patient verbalized understanding of the information given and agrees with the plan.  This note was dictated using Loud3r software.  It may contain errors including improperly dictated words.  Please contact physician directly for any questions.    History of Present Illness   Chief complaint:   Chief Complaint   Patient presents with    Left Knee - Follow-up     Pt present for F/U for B/L Knee pain. Previous injections effectiveness lasted randomly due to aggravations. Aggravations including walking, climbing stairs, and cold weather. Pain travels  from Hips down to feet. Swelling present. No audible symptoms.     Right Knee - Follow-up       HPI: Jamaica Arrington is a 49 y.o. female that c/o bilateral knee pain.      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 If not, why: Yes  Physical Therapy or Home Exercises: HEP  Injections: Last CSI 09/06/2024 with benefit  Bracing: Short hinge knee brace as needed  Previous Surgery: NA  Miscellaneous: Patient is diabetic, last A1C 6.7 on 12/03/2024. Patient reports she has lost 30 lb so far.     ROS:    See HPI for musculoskeletal review.   All other systems reviewed are negative     Historical Information   Past Medical History:   Diagnosis Date    Anemia     Aneurysm (HCC)     Anxiety     Arthritis     Depression     Headache(784.0)     Hypertension     Memory loss     Migraine     Morbid obesity with BMI of 45.0-49.9, adult (HCC)      Past Surgical History:   Procedure Laterality Date    BRAIN SURGERY      BREAST CYST EXCISION Right 2021    COLON SURGERY      Sister Charu just found out she has colon cancer    HYSTERECTOMY  2020    AUB; still has ovaries    IR CEREBRAL ANGIOGRAPHY  01/06/2023     Social History   Social History     Substance and Sexual Activity   Alcohol Use Never     Social History     Substance and Sexual Activity   Drug Use Never     Social History     Tobacco Use   Smoking Status Never   Smokeless Tobacco Never     Family History:   Family History   Problem Relation Age of Onset    Stroke Mother     Arthritis Mother     Migraines Sister     Aneurysm Sister     Hypertension Sister     Colon cancer Sister     No Known Problems Maternal Grandmother     No Known  Problems Maternal Grandfather     No Known Problems Paternal Grandmother     No Known Problems Paternal Grandfather     Breast cancer Maternal Aunt         age unknown    Cancer Maternal Aunt         Breast cancer    Breast cancer Sister     Cancer Sister         Colon cancer    Ovarian cancer Neg Hx     Uterine cancer Neg Hx        Current Outpatient Medications on File Prior to Visit   Medication Sig Dispense Refill    albuterol (Proventil HFA) 90 mcg/act inhaler Inhale 2 puffs every 6 (six) hours as needed for wheezing 6.7 g 5    amitriptyline (ELAVIL) 50 mg tablet Take 1 tablet (50 mg total) by mouth daily at bedtime 90 tablet 3    chlorthalidone 25 mg tablet TAKE 1/2 A TABLET BY MOUTH EVERY MORNING 45 tablet 1    Cholecalciferol (Vitamin D3) 50 MCG (2000 UT) capsule Take 1 capsule (2,000 Units total) by mouth every morning 90 capsule 0    diclofenac (VOLTAREN) 75 mg EC tablet Take 1 tablet (75 mg total) by mouth 2 (two) times a day as needed (pain) Take with food. 60 tablet 1    fluticasone (FLONASE) 50 mcg/act nasal spray 1 spray into each nostril daily 16 g 0    furosemide (LASIX) 20 mg tablet TAKE ONE TABLET BY MOUTH 2 TIMES A  tablet 1    losartan (COZAAR) 100 MG tablet Take 1 tablet (100 mg total) by mouth in the morning 90 tablet 1    ofloxacin (OCUFLOX) 0.3 % ophthalmic solution Administer 1 drop to the right eye 4 (four) times a day 5 mL 0    omeprazole (PriLOSEC) 40 MG capsule Take 1 capsule (40 mg total) by mouth daily before breakfast 90 capsule 3    polyethylene glycol (GLYCOLAX) 17 GM/SCOOP powder Take 17 g by mouth daily 510 g 2    potassium chloride (Klor-Con M10) 10 mEq tablet Take 1 tablet (10 mEq total) by mouth 2 (two) times a day 180 tablet 1    rimegepant sulfate (NURTEC) 75 mg TBDP TAKE ONE TABLET UNDER TONGUE AT ONSET. LIMIT 1 TABLET IN 24 HOURS 8 tablet 6    tiZANidine (ZANAFLEX) 4 mg tablet Take 1 tablet (4 mg total) by mouth daily at bedtime 30 tablet 0    topiramate (TOPAMAX)  25 mg tablet 1 tab PO QHS for 1 week, increase as tolerated to 1 tab BID for 1 week, then 1 tab QAM and 2 tabs QHS for 1 week and finish at 2 tabs BID. 120 tablet 5    verapamil (Verelan) 120 MG 24 hr capsule Take 1 capsule (120 mg total) by mouth in the morning 100 capsule 1     No current facility-administered medications on file prior to visit.     No Known Allergies    Current Outpatient Medications on File Prior to Visit   Medication Sig Dispense Refill    albuterol (Proventil HFA) 90 mcg/act inhaler Inhale 2 puffs every 6 (six) hours as needed for wheezing 6.7 g 5    amitriptyline (ELAVIL) 50 mg tablet Take 1 tablet (50 mg total) by mouth daily at bedtime 90 tablet 3    chlorthalidone 25 mg tablet TAKE 1/2 A TABLET BY MOUTH EVERY MORNING 45 tablet 1    Cholecalciferol (Vitamin D3) 50 MCG (2000 UT) capsule Take 1 capsule (2,000 Units total) by mouth every morning 90 capsule 0    diclofenac (VOLTAREN) 75 mg EC tablet Take 1 tablet (75 mg total) by mouth 2 (two) times a day as needed (pain) Take with food. 60 tablet 1    fluticasone (FLONASE) 50 mcg/act nasal spray 1 spray into each nostril daily 16 g 0    furosemide (LASIX) 20 mg tablet TAKE ONE TABLET BY MOUTH 2 TIMES A  tablet 1    losartan (COZAAR) 100 MG tablet Take 1 tablet (100 mg total) by mouth in the morning 90 tablet 1    ofloxacin (OCUFLOX) 0.3 % ophthalmic solution Administer 1 drop to the right eye 4 (four) times a day 5 mL 0    omeprazole (PriLOSEC) 40 MG capsule Take 1 capsule (40 mg total) by mouth daily before breakfast 90 capsule 3    polyethylene glycol (GLYCOLAX) 17 GM/SCOOP powder Take 17 g by mouth daily 510 g 2    potassium chloride (Klor-Con M10) 10 mEq tablet Take 1 tablet (10 mEq total) by mouth 2 (two) times a day 180 tablet 1    rimegepant sulfate (NURTEC) 75 mg TBDP TAKE ONE TABLET UNDER TONGUE AT ONSET. LIMIT 1 TABLET IN 24 HOURS 8 tablet 6    tiZANidine (ZANAFLEX) 4 mg tablet Take 1 tablet (4 mg total) by mouth daily at  "bedtime 30 tablet 0    topiramate (TOPAMAX) 25 mg tablet 1 tab PO QHS for 1 week, increase as tolerated to 1 tab BID for 1 week, then 1 tab QAM and 2 tabs QHS for 1 week and finish at 2 tabs BID. 120 tablet 5    verapamil (Verelan) 120 MG 24 hr capsule Take 1 capsule (120 mg total) by mouth in the morning 100 capsule 1     No current facility-administered medications on file prior to visit.       Objective   Vitals: Height 5' 5\" (1.651 m).,Body mass index is 44.1 kg/m².    PE:  AAOx 3  WDWN  Hearing intact, no drainage from eyes  Regular rate  no audible wheezing  no abdominal distension  LE compartments soft, skin intact    bilateralknee:    Appearance:  No  swelling   No  ecchymosis  No  obvious joint deformity   No  effusion   Palpation/Tenderness:  Yes  TTP over medial joint line  Yes  TTP over lateral joint line   No  TTP over patella  No  TTP over patellar tendon  No  TTP over pes anserine bursa  Active Range of Motion:  AROM: 3-115  Special Tests:  Valgus Stress Test: negative  Varus Stress Test: negative  Anterior/ Posterior Drawer: negative    TTP greater trochanteric bursa    Large joint arthrocentesis: bilateral knee  Universal Protocol:  procedure performed by consultantConsent: Verbal consent obtained.  Risks and benefits: risks, benefits and alternatives were discussed  Consent given by: patient  Time out: Immediately prior to procedure a \"time out\" was called to verify the correct patient, procedure, equipment, support staff and site/side marked as required.  Timeout called at: 1/31/2025 4:08 PM.  Patient understanding: patient states understanding of the procedure being performed  Patient identity confirmed: verbally with patient  Supporting Documentation  Indications: pain   Procedure Details  Location: knee - bilateral knee  Needle size: 22 G  Ultrasound guidance: no  Approach: lateral    Medications (Right): 2 mL bupivacaine 0.25 %; 40 mg triamcinolone acetonide 40 mg/mLMedications (Left): 2 mL " bupivacaine 0.25 %; 40 mg triamcinolone acetonide 40 mg/mL   Patient tolerance: patient tolerated the procedure well with no immediate complications            Scribe Attestation      I,:  Roselia Forbes am acting as a scribe while in the presence of the attending physician.:       I,:  Anamika Ramesh DO personally performed the services described in this documentation    as scribed in my presence.:

## 2025-02-04 DIAGNOSIS — I10 BENIGN ESSENTIAL HYPERTENSION: ICD-10-CM

## 2025-02-04 RX ORDER — VERAPAMIL HYDROCHLORIDE 120 MG/1
120 CAPSULE, EXTENDED RELEASE ORAL DAILY
Qty: 90 CAPSULE | Refills: 1 | Status: SHIPPED | OUTPATIENT
Start: 2025-02-04

## 2025-02-04 RX ORDER — VERAPAMIL HYDROCHLORIDE 120 MG/1
120 CAPSULE, EXTENDED RELEASE ORAL DAILY
Qty: 100 CAPSULE | Refills: 0 | OUTPATIENT
Start: 2025-02-04

## 2025-02-05 ENCOUNTER — OFFICE VISIT (OUTPATIENT)
Dept: NEUROSURGERY | Facility: CLINIC | Age: 50
End: 2025-02-05
Payer: COMMERCIAL

## 2025-02-05 VITALS
TEMPERATURE: 98.2 F | SYSTOLIC BLOOD PRESSURE: 132 MMHG | HEIGHT: 65 IN | WEIGHT: 261 LBS | OXYGEN SATURATION: 98 % | BODY MASS INDEX: 43.49 KG/M2 | HEART RATE: 93 BPM | RESPIRATION RATE: 18 BRPM | DIASTOLIC BLOOD PRESSURE: 74 MMHG

## 2025-02-05 DIAGNOSIS — I67.1 CEREBRAL ANEURYSM, NONRUPTURED: ICD-10-CM

## 2025-02-05 DIAGNOSIS — I67.1 BRAIN ANEURYSM: Primary | ICD-10-CM

## 2025-02-05 PROCEDURE — 99214 OFFICE O/P EST MOD 30 MIN: CPT | Performed by: NEUROLOGICAL SURGERY

## 2025-02-05 RX ORDER — BUPIVACAINE HYDROCHLORIDE 2.5 MG/ML
2 INJECTION, SOLUTION INFILTRATION; PERINEURAL
Status: COMPLETED | OUTPATIENT
Start: 2025-01-31 | End: 2025-01-31

## 2025-02-05 RX ORDER — TRIAMCINOLONE ACETONIDE 40 MG/ML
40 INJECTION, SUSPENSION INTRA-ARTICULAR; INTRAMUSCULAR
Status: COMPLETED | OUTPATIENT
Start: 2025-01-31 | End: 2025-01-31

## 2025-02-05 NOTE — PROGRESS NOTES
Name: Jamaica Arrington      : 1975      MRN: [id number]  Encounter Provider: George Alex MD  Encounter Date: 2025   [facility]  :  Assessment & Plan  Brain aneurysm  Previously clipped left MCA aneurysm, 2018 by Dr. Lopez at [facility].  Follow-up arteriogram here in 2023  Her follow-up angiogram demonstrated some aneurysm residual as well as 2 smaller aneurysms along her MCA.  These 2 other smaller aneurysms did not appear to be present at the time of her initial arteriogram and we have been following these closely.  These may be secondary to episodes of temporary clipping or dysmorphic vessel.  Regardless I do not appreciate any significant change on this most recent CTA.  The resolution of the CTAs is somewhat limited and we may require another formal arteriogram in 5 years after the last () to ensure stability.  Regardless we will plan to repeat CTA in another year.  We will continue to closely monitor these.    2.  She does have a family history of aneurysm and subarachnoid hemorrhage.    Orders:    CTA head w wo contrast; Future    Cerebral aneurysm, nonruptured             History of Present Illness   HPI  This is a very pleasant 49-year-old female who had a left MCA aneurysm clipped by Dr. Lopez at [facility] in 2018 and has transferred her care here for evaluation.  She has not had any new neurosurgical or neurologic complaints.     She returns for annual follow-up.  She denies any new neurologic changes or any changes to her medical history.  Her past medical history is significant for hypertension.  Her past surgical history is significant for hysterectomy and craniotomy for clipping.       She is not allergic to any medications.     She is .  She has no children.  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  He denies any history of tobacco or alcohol abuse.     Her sister has an aneurysm as  "well.  It is untreated.  She is unaware of how her parents passed away.       Review of Systems   Constitutional: Negative.    HENT:  Negative for tinnitus.    Eyes:  Negative for visual disturbance.   Respiratory: Negative.     Cardiovascular: Negative.    Gastrointestinal:  Positive for nausea. Negative for vomiting.   Endocrine: Negative.    Genitourinary: Negative.    Musculoskeletal:  Positive for gait problem (off balance uses walker for support, uses walker at home).   Skin: Negative.    Allergic/Immunologic: Negative.    Neurological:  Positive for headaches (today headache 7/10 couple times a week). Negative for dizziness, speech difficulty, weakness and numbness.   Hematological: Negative.    Psychiatric/Behavioral: Negative.        I have personally reviewed the MA's review of systems and made changes as necessary.    Objective   /74 (BP Location: Left arm, Patient Position: Sitting, Cuff Size: Standard)   Pulse 93   Temp 98.2 °F (36.8 °C) (Temporal)   Resp 18   Ht 5' 5\" (1.651 m)   Wt 118 kg (261 lb)   LMP  (LMP Unknown)   SpO2 98%   BMI 43.43 kg/m²     Physical Exam  Neurological Exam  She is well appearing.  Affect is appropriate. Body mass index is 43.43 kg/m².. She is awake alert and oriented.  Hearing and vision are grossly intact.   Her pupils are equal round reactive to light.  Her extraocular movements are intact.  Her face is symmetric.  Tongue is midline.  Facial sensation is intact and symmetric throughout.  Shoulder shrug is 5/5.  There is no drift or dysmetria.     She has full strength in her bilateral upper and lower extremities.  She has normal muscle tone muscle bulk.  Her biceps reflexes and patellar reflexes are 2+ and symmetric.  Cami sign negative bilaterally.  Sensation intact to light touch and pinprick throughout.  Her gait is normal.     Her heart rate is regular.  Normal respiratory effort.  Abdomen nondistended.  Radial pulses 2+.     We reviewed her CTA in " detail as well as compared this to prior arteriography and CTA imaging.      Administrative Statements   I have spent a total time of 30 minutes in caring for this patient on the day of the visit/encounter including Diagnostic results, Prognosis, Risks and benefits of tx options, Instructions for management, Patient and family education, Importance of tx compliance, Risk factor reductions, Impressions, Counseling / Coordination of care, Documenting in the medical record, Reviewing / ordering tests, medicine, procedures  , and Obtaining or reviewing history  .

## 2025-02-07 DIAGNOSIS — I10 BENIGN ESSENTIAL HYPERTENSION: ICD-10-CM

## 2025-02-07 RX ORDER — CHLORTHALIDONE 25 MG/1
TABLET ORAL
Qty: 45 TABLET | Refills: 1 | Status: SHIPPED | OUTPATIENT
Start: 2025-02-07

## 2025-02-07 RX ORDER — CHLORTHALIDONE 25 MG/1
TABLET ORAL
Qty: 45 TABLET | Refills: 0 | OUTPATIENT
Start: 2025-02-07

## 2025-02-09 ENCOUNTER — APPOINTMENT (EMERGENCY)
Dept: RADIOLOGY | Facility: HOSPITAL | Age: 50
End: 2025-02-09
Payer: COMMERCIAL

## 2025-02-09 ENCOUNTER — APPOINTMENT (EMERGENCY)
Dept: CT IMAGING | Facility: HOSPITAL | Age: 50
End: 2025-02-09
Payer: COMMERCIAL

## 2025-02-09 ENCOUNTER — HOSPITAL ENCOUNTER (EMERGENCY)
Facility: HOSPITAL | Age: 50
Discharge: HOME | End: 2025-02-09
Attending: EMERGENCY MEDICINE | Admitting: EMERGENCY MEDICINE
Payer: COMMERCIAL

## 2025-02-09 VITALS
SYSTOLIC BLOOD PRESSURE: 125 MMHG | DIASTOLIC BLOOD PRESSURE: 59 MMHG | HEART RATE: 65 BPM | TEMPERATURE: 98.7 F | OXYGEN SATURATION: 98 % | RESPIRATION RATE: 16 BRPM

## 2025-02-09 DIAGNOSIS — R42 DIZZY: ICD-10-CM

## 2025-02-09 DIAGNOSIS — E87.6 HYPOKALEMIA: ICD-10-CM

## 2025-02-09 DIAGNOSIS — R53.1 WEAKNESS: Primary | ICD-10-CM

## 2025-02-09 LAB
ALBUMIN SERPL BCG-MCNC: 3.9 G/DL (ref 3.5–5)
ALP SERPL-CCNC: 68 U/L (ref 34–104)
ALT SERPL W P-5'-P-CCNC: 18 U/L (ref 7–52)
ANION GAP SERPL CALCULATED.3IONS-SCNC: 5 MMOL/L (ref 4–13)
APTT PPP: 28 SECONDS (ref 23–34)
AST SERPL W P-5'-P-CCNC: 11 U/L (ref 13–39)
ATRIAL RATE: 86 BPM
BASOPHILS # BLD AUTO: 0.03 THOUSANDS/ΜL (ref 0–0.1)
BASOPHILS NFR BLD AUTO: 0 % (ref 0–1)
BILIRUB SERPL-MCNC: 0.41 MG/DL (ref 0.2–1)
BILIRUB UR QL STRIP: NEGATIVE
BNP SERPL-MCNC: 18 PG/ML (ref 0–100)
BUN SERPL-MCNC: 14 MG/DL (ref 5–25)
CALCIUM SERPL-MCNC: 9.1 MG/DL (ref 8.4–10.2)
CARDIAC TROPONIN I PNL SERPL HS: 3 NG/L (ref ?–50)
CHLORIDE SERPL-SCNC: 99 MMOL/L (ref 96–108)
CLARITY UR: CLEAR
CO2 SERPL-SCNC: 31 MMOL/L (ref 21–32)
COLOR UR: NORMAL
CREAT SERPL-MCNC: 0.68 MG/DL (ref 0.6–1.3)
EOSINOPHIL # BLD AUTO: 0.13 THOUSAND/ΜL (ref 0–0.61)
EOSINOPHIL NFR BLD AUTO: 1 % (ref 0–6)
ERYTHROCYTE [DISTWIDTH] IN BLOOD BY AUTOMATED COUNT: 16.4 % (ref 11.6–15.1)
FLUAV AG UPPER RESP QL IA.RAPID: NEGATIVE
FLUBV AG UPPER RESP QL IA.RAPID: NEGATIVE
GFR SERPL CREATININE-BSD FRML MDRD: 103 ML/MIN/1.73SQ M
GLUCOSE SERPL-MCNC: 98 MG/DL (ref 65–140)
GLUCOSE UR STRIP-MCNC: NEGATIVE MG/DL
HCT VFR BLD AUTO: 38.7 % (ref 34.8–46.1)
HGB BLD-MCNC: 12 G/DL (ref 11.5–15.4)
HGB UR QL STRIP.AUTO: NEGATIVE
IMM GRANULOCYTES # BLD AUTO: 0.03 THOUSAND/UL (ref 0–0.2)
IMM GRANULOCYTES NFR BLD AUTO: 0 % (ref 0–2)
INR PPP: 0.99 (ref 0.85–1.19)
KETONES UR STRIP-MCNC: NEGATIVE MG/DL
LEUKOCYTE ESTERASE UR QL STRIP: NEGATIVE
LYMPHOCYTES # BLD AUTO: 2.46 THOUSANDS/ΜL (ref 0.6–4.47)
LYMPHOCYTES NFR BLD AUTO: 27 % (ref 14–44)
MAGNESIUM SERPL-MCNC: 2.3 MG/DL (ref 1.9–2.7)
MCH RBC QN AUTO: 23.5 PG (ref 26.8–34.3)
MCHC RBC AUTO-ENTMCNC: 31 G/DL (ref 31.4–37.4)
MCV RBC AUTO: 76 FL (ref 82–98)
MONOCYTES # BLD AUTO: 0.6 THOUSAND/ΜL (ref 0.17–1.22)
MONOCYTES NFR BLD AUTO: 7 % (ref 4–12)
NEUTROPHILS # BLD AUTO: 5.73 THOUSANDS/ΜL (ref 1.85–7.62)
NEUTS SEG NFR BLD AUTO: 65 % (ref 43–75)
NITRITE UR QL STRIP: NEGATIVE
NRBC BLD AUTO-RTO: 0 /100 WBCS
P AXIS: 60 DEGREES
PH UR STRIP.AUTO: 6.5 [PH]
PLATELET # BLD AUTO: 321 THOUSANDS/UL (ref 149–390)
PMV BLD AUTO: 10.5 FL (ref 8.9–12.7)
POTASSIUM SERPL-SCNC: 3.1 MMOL/L (ref 3.5–5.3)
PR INTERVAL: 160 MS
PROT SERPL-MCNC: 7.2 G/DL (ref 6.4–8.4)
PROT UR STRIP-MCNC: NEGATIVE MG/DL
PROTHROMBIN TIME: 13.3 SECONDS (ref 12.3–15)
QRS AXIS: 38 DEGREES
QRSD INTERVAL: 76 MS
QT INTERVAL: 384 MS
QTC INTERVAL: 459 MS
RBC # BLD AUTO: 5.11 MILLION/UL (ref 3.81–5.12)
SARS-COV+SARS-COV-2 AG RESP QL IA.RAPID: NEGATIVE
SODIUM SERPL-SCNC: 135 MMOL/L (ref 135–147)
SP GR UR STRIP.AUTO: 1.01 (ref 1–1.03)
T WAVE AXIS: 8 DEGREES
UROBILINOGEN UR STRIP-ACNC: <2 MG/DL
VENTRICULAR RATE: 86 BPM
WBC # BLD AUTO: 8.98 THOUSAND/UL (ref 4.31–10.16)

## 2025-02-09 PROCEDURE — 81003 URINALYSIS AUTO W/O SCOPE: CPT | Performed by: EMERGENCY MEDICINE

## 2025-02-09 PROCEDURE — 84484 ASSAY OF TROPONIN QUANT: CPT | Performed by: EMERGENCY MEDICINE

## 2025-02-09 PROCEDURE — 93010 ELECTROCARDIOGRAM REPORT: CPT

## 2025-02-09 PROCEDURE — 85025 COMPLETE CBC W/AUTO DIFF WBC: CPT | Performed by: EMERGENCY MEDICINE

## 2025-02-09 PROCEDURE — 99285 EMERGENCY DEPT VISIT HI MDM: CPT | Performed by: EMERGENCY MEDICINE

## 2025-02-09 PROCEDURE — 87811 SARS-COV-2 COVID19 W/OPTIC: CPT | Performed by: EMERGENCY MEDICINE

## 2025-02-09 PROCEDURE — 71046 X-RAY EXAM CHEST 2 VIEWS: CPT

## 2025-02-09 PROCEDURE — 36415 COLL VENOUS BLD VENIPUNCTURE: CPT | Performed by: EMERGENCY MEDICINE

## 2025-02-09 PROCEDURE — 80053 COMPREHEN METABOLIC PANEL: CPT | Performed by: EMERGENCY MEDICINE

## 2025-02-09 PROCEDURE — 83880 ASSAY OF NATRIURETIC PEPTIDE: CPT | Performed by: EMERGENCY MEDICINE

## 2025-02-09 PROCEDURE — 83735 ASSAY OF MAGNESIUM: CPT | Performed by: EMERGENCY MEDICINE

## 2025-02-09 PROCEDURE — 96360 HYDRATION IV INFUSION INIT: CPT

## 2025-02-09 PROCEDURE — 85730 THROMBOPLASTIN TIME PARTIAL: CPT | Performed by: EMERGENCY MEDICINE

## 2025-02-09 PROCEDURE — 87804 INFLUENZA ASSAY W/OPTIC: CPT | Performed by: EMERGENCY MEDICINE

## 2025-02-09 PROCEDURE — 96361 HYDRATE IV INFUSION ADD-ON: CPT

## 2025-02-09 PROCEDURE — 70450 CT HEAD/BRAIN W/O DYE: CPT

## 2025-02-09 PROCEDURE — 99285 EMERGENCY DEPT VISIT HI MDM: CPT

## 2025-02-09 PROCEDURE — 93005 ELECTROCARDIOGRAM TRACING: CPT

## 2025-02-09 PROCEDURE — 85610 PROTHROMBIN TIME: CPT | Performed by: EMERGENCY MEDICINE

## 2025-02-09 RX ORDER — POTASSIUM CHLORIDE 1500 MG/1
40 TABLET, EXTENDED RELEASE ORAL ONCE
Status: COMPLETED | OUTPATIENT
Start: 2025-02-09 | End: 2025-02-09

## 2025-02-09 RX ORDER — ACETAMINOPHEN 325 MG/1
650 TABLET ORAL ONCE
Status: COMPLETED | OUTPATIENT
Start: 2025-02-09 | End: 2025-02-09

## 2025-02-09 RX ORDER — ONDANSETRON 2 MG/ML
4 INJECTION INTRAMUSCULAR; INTRAVENOUS ONCE
Status: DISCONTINUED | OUTPATIENT
Start: 2025-02-09 | End: 2025-02-09 | Stop reason: HOSPADM

## 2025-02-09 RX ADMIN — SODIUM CHLORIDE 1000 ML: 0.9 INJECTION, SOLUTION INTRAVENOUS at 13:12

## 2025-02-09 RX ADMIN — ACETAMINOPHEN 650 MG: 325 TABLET, FILM COATED ORAL at 14:15

## 2025-02-09 RX ADMIN — POTASSIUM CHLORIDE 40 MEQ: 1500 TABLET, EXTENDED RELEASE ORAL at 14:15

## 2025-02-09 NOTE — ED PROVIDER NOTES
"Time reflects when diagnosis was documented in both MDM as applicable and the Disposition within this note       Time User Action Codes Description Comment    2/9/2025  2:21 PM Bendock, Tanya L Add [R53.1] Weakness     2/9/2025  2:21 PM Bendock, Tanya L Add [R42] Dizzy     2/9/2025  2:21 PM Bendock, Tanya L Add [E87.6] Hypokalemia           ED Disposition       ED Disposition   Discharge    Condition   Stable    Date/Time   Sun Feb 9, 2025  2:21 PM    Comment   [place] [facility] discharge to home/self care.                   Assessment & Plan       Medical Decision Making      Differential diagnosis includes but not limited to: BPPV, Menière's, labyrinthitis, vestibular neuronitis, otitis media, CVA, TIA, arrhthymias, ARNOLD, electrolyte dysfunction, orthostatic hypotension, dehydration, medication reaction, OM, tumor, intracranial process; doubt vertebral or carotid artery dissection     Will obtain EKG t rule out and assess for STEMI versus arrhythmia versuso interval abnormality versus ischemic changes; troponin to evaluate for ischemia; CBC to assess for leukocytosis or anemia; CMP to assess for ARNOLD versus electrolyte abnormalities versus elevated LFTs.  Will also obtain chest x-ray to rule out pneumothorax versus pneumonia versus effusion versus CHF.  Will also obtain CT head rule out intracranial pathology however patient is neuro intact and recently had CTA as well as neurosurgery eval.  Will check flu COVID as well      Amount and/or Complexity of Data Reviewed  External Data Reviewed: notes.     Details:     Patient was seen by neurosurgery on February 5.  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  There is no significant change from her most recent CTA.  \"2 other small aneurysms did not appear to be present at the time of her initial arteriogram.  They may be secondary episodes of temporary clipping or dysmorphic vessels.\"      Labs: ordered. Decision-making details documented in ED " "Course.     Details:     No anemia, thrombocytopenia or leukocytosis  Urine clear  Troponin 3 with a heart score of 3  No acute kidney injury or electrolyte abnormalities except for potassium 3.1  FLU/COVID negative        Radiology: ordered and independent interpretation performed. Decision-making details documented in ED Course.     Details:     CT head interpreted by radiologist as no ICH   CXR interpreted by myself as no acute findings          ECG/medicine tests: ordered and independent interpretation performed. Decision-making details documented in ED Course.     Details:     No ischemia or arrhythmia    Risk  OTC drugs.  Prescription drug management.          ED Course as of 02/09/25 1606   Sun Feb 09, 2025   1218 Patient is a 49 year old female coming in with onset of generalized on feeling well, dizzy, headache.  On exam well-appearing in no acute distress and hemodynamically stable.  Will review chart and start medical workup including EKG, IV fluids and labs    Disclosure: Voice to text software was used in the preparation of this document and could have resulted in translational errors.      Occasional wrong word or \"sound a like\" substitutions may have occurred due to the inherent limitations of voice recognition software.  Read the chart carefully and recognize, using context, where substitutions have occurred.       I have independently reviewed external records are available to me to the level of detail possible within the time constraints of my patient care responsibilities in the ED.       1247 Pending official CT read however on my review no acute findings       1329 CBC and differential(!)  WNL       1329 UA (URINE) with reflex to Scope  Clear       1330 CT head without contrast  WNL       1349 Comprehensive metabolic panel(!)  K, 3.1 and will replete       1349 HS Troponin 0hr (reflex protocol)  3   1405 FLU/COVID Rapid Antigen (30 min. TAT) - Preferred screening test in ED  Negative       1416 " Patient resting in bed.  Patient denies discussion regarding workup.  No evidence of arrhythmia or endorgan damage.  Potassium replaced.  Will trial p.o.  Patient resting in bed in no distress and will finish IV fluids.         1443 Patient receiving IVF         Medications   ondansetron (ZOFRAN) injection 4 mg (0 mg Intravenous Hold 2/9/25 1312)   sodium chloride 0.9 % bolus 1,000 mL (0 mL Intravenous Stopped 2/9/25 1509)   acetaminophen (TYLENOL) tablet 650 mg (650 mg Oral Given 2/9/25 1415)   potassium chloride (Klor-Con M20) CR tablet 40 mEq (40 mEq Oral Given 2/9/25 1415)       ED Risk Strat Scores   HEART Risk Score      Flowsheet Row Most Recent Value   Heart Score Risk Calculator    History 0 Filed at: 02/09/2025 1351   ECG 0 Filed at: 02/09/2025 1351   Age 1 Filed at: 02/09/2025 1351   Risk Factors 2 Filed at: 02/09/2025 1351   Troponin 0 Filed at: 02/09/2025 1351   HEART Score 3 Filed at: 02/09/2025 1351          HEART Risk Score      Flowsheet Row Most Recent Value   Heart Score Risk Calculator    History 0 Filed at: 02/09/2025 1351   ECG 0 Filed at: 02/09/2025 1351   Age 1 Filed at: 02/09/2025 1351   Risk Factors 2 Filed at: 02/09/2025 1351   Troponin 0 Filed at: 02/09/2025 1351   HEART Score 3 Filed at: 02/09/2025 1351                      PERC Rule for PE      Flowsheet Row Most Recent Value   PERC Rule for PE    Age >=50 0 Filed at: 02/09/2025 1222   HR >=100 0 Filed at: 02/09/2025 1222   O2 Sat on room air < 95% 0 Filed at: 02/09/2025 1222   History of PE or DVT 0 Filed at: 02/09/2025 1222   Recent trauma or surgery 0 Filed at: 02/09/2025 1222   Hemoptysis 0 Filed at: 02/09/2025 1222   Exogenous estrogen 0 Filed at: 02/09/2025 1222   Unilateral leg swelling 0 Filed at: 02/09/2025 1222   PERC Rule for PE Results 0 Filed at: 02/09/2025 1222            SBIRT 20yo+      Flowsheet Row Most Recent Value   Initial Alcohol Screen: US AUDIT-C     1. How often do you have a drink containing alcohol? 0 Filed  at: 02/09/2025 1133   2. How many drinks containing alcohol do you have on a typical day you are drinking?  0 Filed at: 02/09/2025 1133   3a. Male UNDER 65: How often do you have five or more drinks on one occasion? 0 Filed at: 02/09/2025 1133   3b. FEMALE Any Age, or MALE 65+: How often do you have 4 or more drinks on one occassion? 0 Filed at: 02/09/2025 1133   Audit-C Score 0 Filed at: 02/09/2025 1133   ROHINI: How many times in the past year have you...    Used an illegal drug or used a prescription medication for non-medical reasons? Never Filed at: 02/09/2025 1133            Wells' Criteria for PE      Flowsheet Row Most Recent Value   Wells' Criteria for PE    Clinical signs and symptoms of DVT 0 Filed at: 02/09/2025 1222   PE is primary diagnosis or equally likely 0 Filed at: 02/09/2025 1222   HR >100 0 Filed at: 02/09/2025 1222   Immobilization at least 3 days or Surgery in the previous 4 weeks 0 Filed at: 02/09/2025 1222   Previous, objectively diagnosed PE or DVT 0 Filed at: 02/09/2025 1222   Hemoptysis 0 Filed at: 02/09/2025 1222   Malignancy with treatment within 6 months or palliative 0 Filed at: 02/09/2025 1222   Wells' Criteria Total 0 Filed at: 02/09/2025 1222                        History of Present Illness       Chief Complaint   Patient presents with    Weakness - Generalized     Pt reports weakness beginning last night after eating dinner. States felt like had to have BM and felt nauseous with dizziness. -LOC        Past Medical History:   Diagnosis Date    Anemia     Aneurysm (HCC)     Anxiety     Arthritis     Depression     Headache(784.0)     Hypertension     Memory loss     Migraine     Morbid obesity with BMI of 45.0-49.9, adult (HCC)       Past Surgical History:   Procedure Laterality Date    BRAIN SURGERY      BREAST CYST EXCISION Right 2021    COLON SURGERY      Sister Charu just found out she has colon cancer    HYSTERECTOMY  2020    AUB; still has ovaries    IR CEREBRAL ANGIOGRAPHY   01/06/2023      Family History   Problem Relation Age of Onset    Stroke Mother     Arthritis Mother     Migraines Sister     Aneurysm Sister     Hypertension Sister     Colon cancer Sister     No Known Problems Maternal Grandmother     No Known Problems Maternal Grandfather     No Known Problems Paternal Grandmother     No Known Problems Paternal Grandfather     Breast cancer Maternal Aunt         age unknown    Cancer Maternal Aunt         Breast cancer    Breast cancer Sister     Cancer Sister         Colon cancer    Ovarian cancer Neg Hx     Uterine cancer Neg Hx       Social History     Tobacco Use    Smoking status: Never    Smokeless tobacco: Never   Vaping Use    Vaping status: Never Used   Substance Use Topics    Alcohol use: Never    Drug use: Never      E-Cigarette/Vaping    E-Cigarette Use Never User       E-Cigarette/Vaping Substances    Nicotine No     THC No     CBD No     Flavoring No     Other No     Unknown No       I have reviewed and agree with the history as documented.     Patient is a 49-year-old female with a history of diabetes, asthma, hypertension, obesity, cerebral aneurysm following with neurosurgery, migraines coming in today with generalized not feeling well.  She states that last night she reported generalized weakness and nausea.  She was able to go to sleep with help of medication.  She woke up today and felt worse.  She states the left side of her head has been hurting and generalized fatigue.  Reports that she felt like her she was dizzy but cannot explain what.  She went to walk to the bathroom when she felt like she had to turn around and sit down or she has been a fall.  She had no chest pain, palpitations, syncope, lower extremity edema.  She reports that she feels like she is to have diarrhea but cannot.  She has decreased p.o. intake.  She has no recent travel or sick contacts      History provided by:  Medical records and patient   used: No     Fatigue  Severity:  Mild  Onset quality:  Gradual  Duration:  1 day  Timing:  Intermittent  Progression:  Waxing and waning  Chronicity:  New  Context: not alcohol use, not allergies, not change in medication, not decreased sleep, not dehydration, not drug use, not increased activity, not pinched nerve, not recent infection, not stress and not urinary tract infection    Relieved by:  Nothing  Worsened by:  Nothing  Ineffective treatments:  None tried  Associated symptoms: dizziness, headaches, lethargy, myalgias and nausea    Associated symptoms: no abdominal pain, no anorexia, no aphasia, no arthralgias, no ataxia, no chest pain, no cough, no diarrhea, no difficulty walking, no drooling, no dysphagia, no dysuria, no numbness in extremities, no falls, no fever, no foul-smelling urine, no frequency, no hematochezia, no loss of consciousness, no melena, no near-syncope, no seizures, no sensory-motor deficit, no shortness of breath, no stroke symptoms, no syncope, no urgency, no vision change and no vomiting    Risk factors: diabetes    Risk factors: no anemia, no congestive heart failure, no coronary artery disease, no excessive menstruation, no family hx of stroke, no heart disease, no neurologic disease, no new medications and no recent stressors        Review of Systems   Constitutional:  Positive for fatigue. Negative for chills and fever.   HENT: Negative.  Negative for drooling, ear pain and sore throat.    Eyes: Negative.  Negative for pain and visual disturbance.   Respiratory: Negative.  Negative for cough and shortness of breath.    Cardiovascular: Negative.  Negative for chest pain, palpitations, syncope and near-syncope.   Gastrointestinal:  Positive for nausea. Negative for abdominal pain, anorexia, diarrhea, dysphagia, hematochezia, melena and vomiting.   Genitourinary: Negative.  Negative for dysuria, frequency, hematuria and urgency.   Musculoskeletal:  Positive for myalgias. Negative for  arthralgias, back pain and falls.   Skin: Negative.  Negative for color change and rash.   Neurological:  Positive for dizziness and headaches. Negative for seizures, loss of consciousness and syncope.   Hematological: Negative.    Psychiatric/Behavioral: Negative.     All other systems reviewed and are negative.          Objective       ED Triage Vitals   Temperature Pulse Blood Pressure Respirations SpO2 Patient Position - Orthostatic VS   02/09/25 1111 02/09/25 1111 02/09/25 1111 02/09/25 1111 02/09/25 1111 02/09/25 1111   98.7 °F (37.1 °C) 97 148/68 16 99 % Sitting      Temp src Heart Rate Source BP Location FiO2 (%) Pain Score    -- 02/09/25 1321 02/09/25 1111 -- 02/09/25 1415     Monitor Right arm  2      Vitals      Date and Time Temp Pulse SpO2 Resp BP Pain Score FACES Pain Rating User   02/09/25 1509 -- 65 98 % 16 125/59 -- --    02/09/25 1415 -- -- -- -- -- 2 --    02/09/25 1321 -- 79 98 % 16 132/63 -- --    02/09/25 1111 98.7 °F (37.1 °C) 97 99 % 16 148/68 -- -- JL            Physical Exam  Vitals and nursing note reviewed.   Constitutional:       General: She is awake. She is not in acute distress.     Appearance: Normal appearance. She is well-developed. She is obese.   HENT:      Head: Normocephalic and atraumatic.      Right Ear: Tympanic membrane, ear canal and external ear normal.      Left Ear: Tympanic membrane, ear canal and external ear normal.      Nose: Nose normal.      Mouth/Throat:      Lips: Pink.      Mouth: Mucous membranes are moist.      Pharynx: Oropharynx is clear. Uvula midline.      Comments: Patient maintaining airway maintaining secretions.  Midline without edema.  No brawniness under the tongue.  No posterior pharyngeal erythema or exudate  Eyes:      General: Lids are normal. Gaze aligned appropriately.      Extraocular Movements: Extraocular movements intact.      Conjunctiva/sclera: Conjunctivae normal.      Pupils: Pupils are equal, round, and reactive to light.    Neck:      Trachea: Trachea normal.   Cardiovascular:      Rate and Rhythm: Normal rate and regular rhythm.      Pulses:           Radial pulses are 2+ on the right side and 2+ on the left side.        Dorsalis pedis pulses are 2+ on the right side and 2+ on the left side.      Heart sounds: Normal heart sounds, S1 normal and S2 normal. No murmur heard.  Pulmonary:      Effort: Pulmonary effort is normal. No respiratory distress.      Breath sounds: Normal breath sounds.   Abdominal:      Palpations: Abdomen is soft.      Tenderness: There is no abdominal tenderness.   Musculoskeletal:         General: No swelling.      Cervical back: Normal range of motion and neck supple. No rigidity.      Right lower leg: No edema.      Left lower leg: No edema.   Lymphadenopathy:      Cervical: No cervical adenopathy.   Skin:     General: Skin is warm and dry.      Capillary Refill: Capillary refill takes less than 2 seconds.   Neurological:      General: No focal deficit present.      Mental Status: She is alert and oriented to person, place, and time.      GCS: GCS eye subscore is 4. GCS verbal subscore is 5. GCS motor subscore is 6.      Cranial Nerves: Cranial nerves 2-12 are intact.      Sensory: Sensation is intact.      Motor: Motor function is intact.      Coordination: Coordination is intact.      Comments: No slurred speech, facial asymmetry or tongue deviation    No nystagmus.  Patient immobile upper extremities and lower extremity spontaneously each other independently and pain.   Psychiatric:         Mood and Affect: Mood normal.         Behavior: Behavior is cooperative.         Results Reviewed       Procedure Component Value Units Date/Time    FLU/COVID Rapid Antigen (30 min. TAT) - Preferred screening test in ED [[id number]]  (Normal) Collected: 02/09/25 1313    Lab Status: Final result Specimen: Nares from Nose Updated: 02/09/25 1402     SARS COV Rapid Antigen Negative     Influenza A Rapid Antigen Negative      Influenza B Rapid Antigen Negative    Narrative:      This test has been performed using the Quidel Berna 2 FLU+SARS Antigen test under the Emergency Use Authorization (EUA). This test has been validated by the  and verified by the performing laboratory. The Berna uses lateral flow immunofluorescent sandwich assay to detect SARS-COV, Influenza A and Influenza B Antigen.     The Quidel Berna 2 SARS Antigen test does not differentiate between SARS-CoV and SARS-CoV-2.     Negative results are presumptive and may be confirmed with a molecular assay, if necessary, for patient management. Negative results do not rule out SARS-CoV-2 or influenza infection and should not be used as the sole basis for treatment or patient management decisions. A negative test result may occur if the level of antigen in a sample is below the limit of detection of this test.     Positive results are indicative of the presence of viral antigens, but do not rule out bacterial infection or co-infection with other viruses.     All test results should be used as an adjunct to clinical observations and other information available to the provider.    FOR PEDIATRIC PATIENTS - copy/paste COVID Guidelines URL to browser: [email or web address]    HS Troponin 0hr (reflex protocol) [[id number]]  (Normal) Collected: 02/09/25 1313    Lab Status: Final result Specimen: Blood from Arm, Left Updated: 02/09/25 1344     hs TnI 0hr 3 ng/L     B-Type Natriuretic Peptide(BNP) [[id number]]  (Normal) Collected: 02/09/25 1313    Lab Status: Final result Specimen: Blood from Arm, Left Updated: 02/09/25 1344     BNP 18 pg/mL     Comprehensive metabolic panel [[id number]]  (Abnormal) Collected: 02/09/25 1313    Lab Status: Final result Specimen: Blood from Arm, Left Updated: 02/09/25 1342     Sodium 135 mmol/L      Potassium 3.1 mmol/L      Chloride 99 mmol/L      CO2 31 mmol/L      ANION GAP 5 mmol/L      BUN  14 mg/dL      Creatinine 0.68 mg/dL      Glucose 98 mg/dL      Calcium 9.1 mg/dL      AST 11 U/L      ALT 18 U/L      Alkaline Phosphatase 68 U/L      Total Protein 7.2 g/dL      Albumin 3.9 g/dL      Total Bilirubin 0.41 mg/dL      eGFR 103 ml/min/1.73sq m     Narrative:      National Kidney Disease Foundation guidelines for Chronic Kidney Disease (CKD):     Stage 1 with normal or high GFR (GFR > 90 mL/min/1.73 square meters)    Stage 2 Mild CKD (GFR = 60-89 mL/min/1.73 square meters)    Stage 3A Moderate CKD (GFR = 45-59 mL/min/1.73 square meters)    Stage 3B Moderate CKD (GFR = 30-44 mL/min/1.73 square meters)    Stage 4 Severe CKD (GFR = 15-29 mL/min/1.73 square meters)    Stage 5 End Stage CKD (GFR <15 mL/min/1.73 square meters)  Note: GFR calculation is accurate only with a steady state creatinine    Magnesium [[id number]]  (Normal) Collected: 02/09/25 1313    Lab Status: Final result Specimen: Blood from Arm, Left Updated: 02/09/25 1342     Magnesium 2.3 mg/dL     Protime-INR [[id number]]  (Normal) Collected: 02/09/25 1313    Lab Status: Final result Specimen: Blood from Arm, Left Updated: 02/09/25 1340     Protime 13.3 seconds      INR 0.99    Narrative:      INR Therapeutic Range    Indication                                             INR Range      Atrial Fibrillation                                               2.0-3.0  Hypercoagulable State                                    2.0.2.3  Left Ventricular Asist Device                            2.0-3.0  Mechanical Heart Valve                                  -    Aortic(with afib, MI, embolism, HF, LA enlargement,    and/or coagulopathy)                                     2.0-3.0 (2.5-3.5)     Mitral                                                             2.5-3.5  Prosthetic/Bioprosthetic Heart Valve               2.0-3.0  Venous thromboembolism (VTE: VT, PE        2.0-3.0    APTT [[id number]]  (Normal) Collected: 02/09/25 1313    Lab Status: Final  result Specimen: Blood from Arm, Left Updated: 02/09/25 1340     PTT 28 seconds     UA (URINE) with reflex to Scope [[id number]] Collected: 02/09/25 1315    Lab Status: Final result Specimen: Urine, Clean Catch Updated: 02/09/25 1325     Color, UA Light Yellow     Clarity, UA Clear     Specific Gravity, UA 1.012     pH, UA 6.5     Leukocytes, UA Negative     Nitrite, UA Negative     Protein, UA Negative mg/dl      Glucose, UA Negative mg/dl      Ketones, UA Negative mg/dl      Urobilinogen, UA <2.0 mg/dl      Bilirubin, UA Negative     Occult Blood, UA Negative    CBC and differential [[id number]]  (Abnormal) Collected: 02/09/25 1313    Lab Status: Final result Specimen: Blood from Arm, Left Updated: 02/09/25 1324     WBC 8.98 Thousand/uL      RBC 5.11 Million/uL      Hemoglobin 12.0 g/dL      Hematocrit 38.7 %      MCV 76 fL      MCH 23.5 pg      MCHC 31.0 g/dL      RDW 16.4 %      MPV 10.5 fL      Platelets 321 Thousands/uL      nRBC 0 /100 WBCs      Segmented % 65 %      Immature Grans % 0 %      Lymphocytes % 27 %      Monocytes % 7 %      Eosinophils Relative 1 %      Basophils Relative 0 %      Absolute Neutrophils 5.73 Thousands/µL      Absolute Immature Grans 0.03 Thousand/uL      Absolute Lymphocytes 2.46 Thousands/µL      Absolute Monocytes 0.60 Thousand/µL      Eosinophils Absolute 0.13 Thousand/µL      Basophils Absolute 0.03 Thousands/µL             XR chest 2 views   ED Interpretation by Tanya Zimmerman DO (02/09 1351)   No acute findings          CT head without contrast   Final Interpretation by Guzman Villa MD (02/09 1320)      No acute intracranial hemorrhage or mass effect.                  Workstation performed: [id number]             ECG 12 Lead Documentation Only    Date/Time: 2/9/2025 12:20 PM    Performed by: Tanya Zimmerman DO  Authorized by: Tanya Zimmerman DO    Indications / Diagnosis:  Dizzy  Patient location:  ED  Previous ECG:     Previous ECG:  Compared to current     Comparison ECG info:  May 2024    Similarity:  No change  Interpretation:     Interpretation: non-specific    Quality:     Tracing quality:  Limited by artifact  Rate:     ECG rate:  86    ECG rate assessment: normal    Rhythm:     Rhythm: sinus rhythm    Ectopy:     Ectopy: none    QRS:     QRS axis:  Normal    QRS intervals:  Normal  Conduction:     Conduction: normal    ST segments:     ST segments:  Non-specific  T waves:     T waves: non-specific    Comments:      QTc measured at 459 ms  Normal axis      ED Medication and Procedure Management   Prior to Admission Medications   Prescriptions Last Dose Informant Patient Reported? Taking?   Cholecalciferol (Vitamin D3) 50 MCG (2000 UT) capsule   No No   Sig: Take 1 capsule (2,000 Units total) by mouth every morning   albuterol (Proventil HFA) 90 mcg/act inhaler  Self No No   Sig: Inhale 2 puffs every 6 (six) hours as needed for wheezing   amitriptyline (ELAVIL) 50 mg tablet   No No   Sig: Take 1 tablet (50 mg total) by mouth daily at bedtime   chlorthalidone 25 mg tablet   No No   Sig: TAKE 1/2 A TABLET BY MOUTH EVERY MORNING   diclofenac (VOLTAREN) 75 mg EC tablet  Self No No   Sig: Take 1 tablet (75 mg total) by mouth 2 (two) times a day as needed (pain) Take with food.   fluticasone (FLONASE) 50 mcg/act nasal spray  Self No No   Si spray into each nostril daily   furosemide (LASIX) 20 mg tablet  Self No No   Sig: TAKE ONE TABLET BY MOUTH 2 TIMES A DAY   losartan (COZAAR) 100 MG tablet  Self No No   Sig: Take 1 tablet (100 mg total) by mouth in the morning   ofloxacin (OCUFLOX) 0.3 % ophthalmic solution  Self No No   Sig: Administer 1 drop to the right eye 4 (four) times a day   omeprazole (PriLOSEC) 40 MG capsule  Self No No   Sig: Take 1 capsule (40 mg total) by mouth daily before breakfast   polyethylene glycol (GLYCOLAX) 17 GM/SCOOP powder   No No   Sig: Take 17 g by mouth daily   potassium chloride (Klor-Con M10) 10 mEq tablet  Self No No   Sig: Take 1  tablet (10 mEq total) by mouth 2 (two) times a day   rimegepant sulfate (NURTEC) 75 mg TBDP   No No   Sig: TAKE ONE TABLET UNDER TONGUE AT ONSET. LIMIT 1 TABLET IN 24 HOURS   tiZANidine (ZANAFLEX) 4 mg tablet  Self No No   Sig: Take 1 tablet (4 mg total) by mouth daily at bedtime   topiramate (TOPAMAX) 25 mg tablet   No No   Si tab PO QHS for 1 week, increase as tolerated to 1 tab BID for 1 week, then 1 tab QAM and 2 tabs QHS for 1 week and finish at 2 tabs BID.   verapamil (Verelan) 120 MG 24 hr capsule   No No   Sig: Take 1 capsule (120 mg total) by mouth in the morning      Facility-Administered Medications: None     Discharge Medication List as of 2025  2:22 PM        CONTINUE these medications which have NOT CHANGED    Details   albuterol (Proventil HFA) 90 mcg/act inhaler Inhale 2 puffs every 6 (six) hours as needed for wheezing, Starting 2024, Normal      amitriptyline (ELAVIL) 50 mg tablet Take 1 tablet (50 mg total) by mouth daily at bedtime, Starting Mon 2025, Normal      chlorthalidone 25 mg tablet TAKE 1/2 A TABLET BY MOUTH EVERY MORNING, Normal      Cholecalciferol (Vitamin D3) 50 MCG (2000 UT) capsule Take 1 capsule (2,000 Units total) by mouth every morning, Starting Mon 2025, Normal      diclofenac (VOLTAREN) 75 mg EC tablet Take 1 tablet (75 mg total) by mouth 2 (two) times a day as needed (pain) Take with food., Starting Fri 10/18/2024, Normal      fluticasone (FLONASE) 50 mcg/act nasal spray 1 spray into each nostril daily, Starting Tue 2024, Normal      furosemide (LASIX) 20 mg tablet TAKE ONE TABLET BY MOUTH 2 TIMES A DAY, Starting Fri 11/15/2024, Normal      losartan (COZAAR) 100 MG tablet Take 1 tablet (100 mg total) by mouth in the morning, Starting Fri 10/11/2024, Normal      ofloxacin (OCUFLOX) 0.3 % ophthalmic solution Administer 1 drop to the right eye 4 (four) times a day, Starting Thu 2024, Normal      omeprazole (PriLOSEC) 40 MG capsule Take 1  capsule (40 mg total) by mouth daily before breakfast, Starting Thu 6/15/2023, Normal      polyethylene glycol (GLYCOLAX) 17 GM/SCOOP powder Take 17 g by mouth daily, Starting Thu 1/23/2025, Normal      potassium chloride (Klor-Con M10) 10 mEq tablet Take 1 tablet (10 mEq total) by mouth 2 (two) times a day, Starting Fri 1/17/2025, Normal      rimegepant sulfate (NURTEC) 75 mg TBDP TAKE ONE TABLET UNDER TONGUE AT ONSET. LIMIT 1 TABLET IN 24 HOURS, Normal      tiZANidine (ZANAFLEX) 4 mg tablet Take 1 tablet (4 mg total) by mouth daily at bedtime, Starting Wed 10/23/2024, Normal      topiramate (TOPAMAX) 25 mg tablet 1 tab PO QHS for 1 week, increase as tolerated to 1 tab BID for 1 week, then 1 tab QAM and 2 tabs QHS for 1 week and finish at 2 tabs BID., Normal      verapamil (Verelan) 120 MG 24 hr capsule Take 1 capsule (120 mg total) by mouth in the morning, Starting Tue 2/4/2025, Normal           No discharge procedures on file.  ED SEPSIS DOCUMENTATION   Time reflects when diagnosis was documented in both MDM as applicable and the Disposition within this note       Time User Action Codes Description Comment    2/9/2025  2:21 PM Tanya Zimmerman Add [R53.1] Weakness     2/9/2025  2:21 PM Tanya Zimmerman Add [R42] Dizzy     2/9/2025  2:21 PM Tanya Zimmerman Add [E87.6] Hypokalemia                  Tanya Zimmerman,   02/09/25 1606

## 2025-02-20 ENCOUNTER — TELEPHONE (OUTPATIENT)
Dept: FAMILY MEDICINE CLINIC | Facility: CLINIC | Age: 50
End: 2025-02-20

## 2025-02-20 NOTE — TELEPHONE ENCOUNTER
Call pt LVM according to pots (star) pt will no longer have insurance and need telehealth need to contact the office.

## 2025-03-13 ENCOUNTER — OFFICE VISIT (OUTPATIENT)
Dept: FAMILY MEDICINE CLINIC | Facility: CLINIC | Age: 50
End: 2025-03-13

## 2025-03-13 VITALS
HEIGHT: 65 IN | SYSTOLIC BLOOD PRESSURE: 128 MMHG | TEMPERATURE: 97.4 F | RESPIRATION RATE: 18 BRPM | HEART RATE: 100 BPM | DIASTOLIC BLOOD PRESSURE: 80 MMHG | BODY MASS INDEX: 45.25 KG/M2 | OXYGEN SATURATION: 99 % | WEIGHT: 271.6 LBS

## 2025-03-13 DIAGNOSIS — I10 BENIGN ESSENTIAL HYPERTENSION: ICD-10-CM

## 2025-03-13 DIAGNOSIS — R60.9 EDEMA, UNSPECIFIED TYPE: ICD-10-CM

## 2025-03-13 DIAGNOSIS — E87.6 HYPOKALEMIA: ICD-10-CM

## 2025-03-13 PROCEDURE — 99214 OFFICE O/P EST MOD 30 MIN: CPT | Performed by: PHYSICIAN ASSISTANT

## 2025-03-13 RX ORDER — POTASSIUM CHLORIDE 750 MG/1
10 TABLET, EXTENDED RELEASE ORAL 2 TIMES DAILY
Qty: 90 TABLET | Refills: 1 | Status: SHIPPED | OUTPATIENT
Start: 2025-03-13

## 2025-03-13 RX ORDER — SPIRONOLACTONE 25 MG/1
25 TABLET ORAL DAILY
Qty: 90 TABLET | Refills: 1 | Status: SHIPPED | OUTPATIENT
Start: 2025-03-13

## 2025-03-13 RX ORDER — LOSARTAN POTASSIUM 100 MG/1
100 TABLET ORAL DAILY
Qty: 90 TABLET | Refills: 1 | Status: SHIPPED | OUTPATIENT
Start: 2025-03-13

## 2025-03-13 RX ORDER — FUROSEMIDE 20 MG/1
20 TABLET ORAL 2 TIMES DAILY
Qty: 180 TABLET | Refills: 1 | Status: SHIPPED | OUTPATIENT
Start: 2025-03-13

## 2025-03-13 NOTE — ASSESSMENT & PLAN NOTE
I would recommend she continue 10 mill equivalents of potassium daily.  She will try to use Lasix 1 or 2 tablets daily as necessary.  If she is having less swelling she may use just 1 tablet daily.  We will try replacing chlorthalidone with spironolactone 25 mg daily since her last potassium was 3.1 and she has been having difficulty with hypokalemia.  Orders:  •  potassium chloride (Klor-Con M10) 10 mEq tablet; Take 1 tablet (10 mEq total) by mouth 2 (two) times a day  •  furosemide (LASIX) 20 mg tablet; Take 1 tablet (20 mg total) by mouth 2 (two) times a day  •  spironolactone (ALDACTONE) 25 mg tablet; Take 1 tablet (25 mg total) by mouth daily  •  Comprehensive metabolic panel; Future

## 2025-03-13 NOTE — PROGRESS NOTES
Name: Jamaica Arrington      : 1975      MRN: [id number]  Encounter Provider: Jamie Rizo PA-C  Encounter Date: 3/13/2025   Encounter department: [facility]  :  Assessment & Plan  Benign essential hypertension  Chlorthalidone discontinued.  Otherwise blood pressure has been stable.  Orders:  •  losartan (COZAAR) 100 MG tablet; Take 1 tablet (100 mg total) by mouth in the morning  •  spironolactone (ALDACTONE) 25 mg tablet; Take 1 tablet (25 mg total) by mouth daily    Edema, unspecified type  Continue furosemide 20 mg 1-2 times daily.  Chlorthalidone discontinued.  Aldactone 25 mg daily started.  Orders:  •  potassium chloride (Klor-Con M10) 10 mEq tablet; Take 1 tablet (10 mEq total) by mouth 2 (two) times a day  •  furosemide (LASIX) 20 mg tablet; Take 1 tablet (20 mg total) by mouth 2 (two) times a day  •  spironolactone (ALDACTONE) 25 mg tablet; Take 1 tablet (25 mg total) by mouth daily    Hypokalemia  I would recommend she continue 10 mill equivalents of potassium daily.  She will try to use Lasix 1 or 2 tablets daily as necessary.  If she is having less swelling she may use just 1 tablet daily.  We will try replacing chlorthalidone with spironolactone 25 mg daily since her last potassium was 3.1 and she has been having difficulty with hypokalemia.  Orders:  •  potassium chloride (Klor-Con M10) 10 mEq tablet; Take 1 tablet (10 mEq total) by mouth 2 (two) times a day  •  furosemide (LASIX) 20 mg tablet; Take 1 tablet (20 mg total) by mouth 2 (two) times a day  •  spironolactone (ALDACTONE) 25 mg tablet; Take 1 tablet (25 mg total) by mouth daily  •  Comprehensive metabolic panel; Future          Depression Screening and Follow-up Plan: Patient's depression screening was positive with a PHQ-2 score of 6. Their PHQ-9 score was 13.         History of Present Illness   HPI: This is a 49-year-old female who presents to the office for follow-up of ER visit 1 month ago.  She was  "having weakness, headache, and diagnosed with low potassium.  She had extensive cardiac test which were normal and she is not having any chest pain.  She states she had another headache this morning but it has resolved.  She is currently on furosemide 20 mg twice daily and chlorthalidone half tablet daily for edema of the lower extremities.  She is also taking 20 mill equivalents of potassium daily.  She states she did run out of the potassium pills a few days ago.  Has not had any blood work follow-up since her hospitalization.      Review of Systems   Constitutional:  Negative for activity change, appetite change, diaphoresis and fever.   Respiratory:  Negative for chest tightness and shortness of breath.    Cardiovascular:  Negative for chest pain, palpitations and leg swelling.   Gastrointestinal:  Negative for abdominal pain.   Genitourinary:  Negative for difficulty urinating.   Musculoskeletal:  Negative for arthralgias, back pain, gait problem, joint swelling, myalgias, neck pain and neck stiffness.   Skin:  Negative for rash and wound.   Neurological:  Positive for headaches. Negative for dizziness, tremors, weakness, light-headedness and numbness.   Hematological:  Does not bruise/bleed easily.   Psychiatric/Behavioral:  Negative for dysphoric mood and sleep disturbance. The patient is not nervous/anxious.        Objective   /80 (BP Location: Left arm, Patient Position: Sitting, Cuff Size: Adult)   Pulse 100   Temp (!) 97.4 °F (36.3 °C) (Temporal)   Resp 18   Ht 5' 5\" (1.651 m)   Wt 123 kg (271 lb 9.6 oz)   LMP  (LMP Unknown)   SpO2 99%   BMI 45.20 kg/m²      Physical Exam  Constitutional:       General: She is not in acute distress.     Appearance: Normal appearance.   HENT:      Head: Normocephalic and atraumatic.      Right Ear: Tympanic membrane normal.      Left Ear: Tympanic membrane normal.      Nose: No congestion or rhinorrhea.   Eyes:      Conjunctiva/sclera: Conjunctivae normal.    "   Pupils: Pupils are equal, round, and reactive to light.   Neck:      Vascular: No carotid bruit.   Cardiovascular:      Rate and Rhythm: Normal rate and regular rhythm.      Heart sounds: No murmur heard.  Pulmonary:      Effort: Pulmonary effort is normal. No respiratory distress.      Breath sounds: Normal breath sounds.   Abdominal:      Palpations: Abdomen is soft.   Musculoskeletal:         General: Normal range of motion.      Cervical back: Normal range of motion and neck supple. No muscular tenderness.   Lymphadenopathy:      Cervical: No cervical adenopathy.   Skin:     General: Skin is warm.      Capillary Refill: Capillary refill takes less than 2 seconds.   Neurological:      General: No focal deficit present.      Mental Status: She is alert and oriented to person, place, and time.   Psychiatric:         Mood and Affect: Mood normal.

## 2025-03-13 NOTE — ASSESSMENT & PLAN NOTE
Chlorthalidone discontinued.  Otherwise blood pressure has been stable.  Orders:  •  losartan (COZAAR) 100 MG tablet; Take 1 tablet (100 mg total) by mouth in the morning  •  spironolactone (ALDACTONE) 25 mg tablet; Take 1 tablet (25 mg total) by mouth daily

## 2025-03-14 DIAGNOSIS — I10 BENIGN ESSENTIAL HYPERTENSION: ICD-10-CM

## 2025-03-14 RX ORDER — VERAPAMIL HYDROCHLORIDE 120 MG/1
120 CAPSULE, EXTENDED RELEASE ORAL DAILY
Qty: 90 CAPSULE | Refills: 1 | Status: SHIPPED | OUTPATIENT
Start: 2025-03-14

## 2025-03-14 NOTE — TELEPHONE ENCOUNTER
Requested Prescriptions     Pending Prescriptions Disp Refills    chlorthalidone 25 mg tablet [Pharmacy Med Name: CHLORTHALIDONE 25 MG TABLET] 45 tablet 0     Sig: TAKE 1/2 A TABLET BY MOUTH EVERY MORNING

## 2025-03-17 RX ORDER — CHLORTHALIDONE 25 MG/1
TABLET ORAL
Qty: 45 TABLET | Refills: 0 | OUTPATIENT
Start: 2025-03-17

## 2025-04-22 ENCOUNTER — APPOINTMENT (OUTPATIENT)
Dept: LAB | Facility: HOSPITAL | Age: 50
End: 2025-04-22

## 2025-04-22 DIAGNOSIS — E87.6 HYPOKALEMIA: ICD-10-CM

## 2025-04-22 DIAGNOSIS — E55.9 VITAMIN D DEFICIENCY: ICD-10-CM

## 2025-04-22 DIAGNOSIS — E11.9 TYPE 2 DIABETES MELLITUS WITHOUT COMPLICATION, WITHOUT LONG-TERM CURRENT USE OF INSULIN (HCC): ICD-10-CM

## 2025-04-22 DIAGNOSIS — G43.109 MIGRAINE WITH AURA AND WITHOUT STATUS MIGRAINOSUS, NOT INTRACTABLE: ICD-10-CM

## 2025-04-22 DIAGNOSIS — J45.21 MILD INTERMITTENT ASTHMA WITH ACUTE EXACERBATION: ICD-10-CM

## 2025-04-22 DIAGNOSIS — I67.1 CEREBRAL ANEURYSM, NONRUPTURED: ICD-10-CM

## 2025-04-22 DIAGNOSIS — E66.01 MORBID OBESITY WITH BMI OF 40.0-44.9, ADULT (HCC): ICD-10-CM

## 2025-04-22 DIAGNOSIS — I10 BENIGN ESSENTIAL HYPERTENSION: ICD-10-CM

## 2025-04-22 LAB
25(OH)D3 SERPL-MCNC: 42.5 NG/ML (ref 30–100)
ALBUMIN SERPL BCG-MCNC: 4.1 G/DL (ref 3.5–5)
ALP SERPL-CCNC: 71 U/L (ref 34–104)
ALT SERPL W P-5'-P-CCNC: 12 U/L (ref 7–52)
ANION GAP SERPL CALCULATED.3IONS-SCNC: 7 MMOL/L (ref 4–13)
AST SERPL W P-5'-P-CCNC: 13 U/L (ref 13–39)
BASOPHILS # BLD AUTO: 0.04 THOUSANDS/ÂΜL (ref 0–0.1)
BASOPHILS NFR BLD AUTO: 1 % (ref 0–1)
BILIRUB SERPL-MCNC: 0.83 MG/DL (ref 0.2–1)
BUN SERPL-MCNC: 7 MG/DL (ref 5–25)
CALCIUM SERPL-MCNC: 9.1 MG/DL (ref 8.4–10.2)
CHLORIDE SERPL-SCNC: 102 MMOL/L (ref 96–108)
CHOLEST SERPL-MCNC: 161 MG/DL (ref ?–200)
CO2 SERPL-SCNC: 29 MMOL/L (ref 21–32)
CREAT SERPL-MCNC: 0.69 MG/DL (ref 0.6–1.3)
CREAT UR-MCNC: 327.9 MG/DL
EOSINOPHIL # BLD AUTO: 0.12 THOUSAND/ÂΜL (ref 0–0.61)
EOSINOPHIL NFR BLD AUTO: 2 % (ref 0–6)
ERYTHROCYTE [DISTWIDTH] IN BLOOD BY AUTOMATED COUNT: 16.9 % (ref 11.6–15.1)
EST. AVERAGE GLUCOSE BLD GHB EST-MCNC: 137 MG/DL
GFR SERPL CREATININE-BSD FRML MDRD: 103 ML/MIN/1.73SQ M
GLUCOSE P FAST SERPL-MCNC: 104 MG/DL (ref 65–99)
HBA1C MFR BLD: 6.4 %
HCT VFR BLD AUTO: 38.8 % (ref 34.8–46.1)
HDLC SERPL-MCNC: 45 MG/DL
HGB BLD-MCNC: 11.9 G/DL (ref 11.5–15.4)
IMM GRANULOCYTES # BLD AUTO: 0.03 THOUSAND/UL (ref 0–0.2)
IMM GRANULOCYTES NFR BLD AUTO: 0 % (ref 0–2)
LDLC SERPL CALC-MCNC: 103 MG/DL (ref 0–100)
LYMPHOCYTES # BLD AUTO: 2.32 THOUSANDS/ÂΜL (ref 0.6–4.47)
LYMPHOCYTES NFR BLD AUTO: 29 % (ref 14–44)
MCH RBC QN AUTO: 23.6 PG (ref 26.8–34.3)
MCHC RBC AUTO-ENTMCNC: 30.7 G/DL (ref 31.4–37.4)
MCV RBC AUTO: 77 FL (ref 82–98)
MICROALBUMIN UR-MCNC: 114 MG/L
MICROALBUMIN/CREAT 24H UR: 35 MG/G CREATININE (ref 0–30)
MONOCYTES # BLD AUTO: 0.53 THOUSAND/ÂΜL (ref 0.17–1.22)
MONOCYTES NFR BLD AUTO: 7 % (ref 4–12)
NEUTROPHILS # BLD AUTO: 4.89 THOUSANDS/ÂΜL (ref 1.85–7.62)
NEUTS SEG NFR BLD AUTO: 61 % (ref 43–75)
NRBC BLD AUTO-RTO: 0 /100 WBCS
PLATELET # BLD AUTO: 293 THOUSANDS/UL (ref 149–390)
PMV BLD AUTO: 10.6 FL (ref 8.9–12.7)
POTASSIUM SERPL-SCNC: 4 MMOL/L (ref 3.5–5.3)
PROT SERPL-MCNC: 7.4 G/DL (ref 6.4–8.4)
RBC # BLD AUTO: 5.05 MILLION/UL (ref 3.81–5.12)
SODIUM SERPL-SCNC: 138 MMOL/L (ref 135–147)
TRIGL SERPL-MCNC: 65 MG/DL (ref ?–150)
TSH SERPL DL<=0.05 MIU/L-ACNC: 1.38 UIU/ML (ref 0.45–4.5)
WBC # BLD AUTO: 7.93 THOUSAND/UL (ref 4.31–10.16)

## 2025-04-22 PROCEDURE — 82570 ASSAY OF URINE CREATININE: CPT

## 2025-04-22 PROCEDURE — 84443 ASSAY THYROID STIM HORMONE: CPT

## 2025-04-22 PROCEDURE — 82306 VITAMIN D 25 HYDROXY: CPT

## 2025-04-22 PROCEDURE — 80061 LIPID PANEL: CPT

## 2025-04-22 PROCEDURE — 85025 COMPLETE CBC W/AUTO DIFF WBC: CPT

## 2025-04-22 PROCEDURE — 36415 COLL VENOUS BLD VENIPUNCTURE: CPT

## 2025-04-22 PROCEDURE — 82043 UR ALBUMIN QUANTITATIVE: CPT

## 2025-04-22 PROCEDURE — 80053 COMPREHEN METABOLIC PANEL: CPT

## 2025-04-22 PROCEDURE — 83036 HEMOGLOBIN GLYCOSYLATED A1C: CPT

## 2025-04-23 ENCOUNTER — RESULTS FOLLOW-UP (OUTPATIENT)
Dept: FAMILY MEDICINE CLINIC | Facility: CLINIC | Age: 50
End: 2025-04-23

## 2025-04-23 RX ORDER — TOPIRAMATE 25 MG/1
50 TABLET, FILM COATED ORAL 2 TIMES DAILY
Qty: 120 TABLET | Refills: 4 | Status: SHIPPED | OUTPATIENT
Start: 2025-04-23

## 2025-04-23 NOTE — TELEPHONE ENCOUNTER
Relayed results to (patient/patient representative as listed on communication consent form) as per provider message. Patient/Patient Representative expressed understanding and did not have any further questions.

## 2025-05-01 ENCOUNTER — TELEPHONE (OUTPATIENT)
Facility: HOSPITAL | Age: 50
End: 2025-05-01

## 2025-05-02 ENCOUNTER — HOSPITAL ENCOUNTER (OUTPATIENT)
Dept: MAMMOGRAPHY | Facility: CLINIC | Age: 50
End: 2025-05-02

## 2025-05-02 VITALS — WEIGHT: 271 LBS | HEIGHT: 65 IN | BODY MASS INDEX: 45.15 KG/M2

## 2025-05-02 DIAGNOSIS — Z12.31 ENCOUNTER FOR SCREENING MAMMOGRAM FOR MALIGNANT NEOPLASM OF BREAST: ICD-10-CM

## 2025-05-02 PROCEDURE — 77063 BREAST TOMOSYNTHESIS BI: CPT

## 2025-05-02 PROCEDURE — 77067 SCR MAMMO BI INCL CAD: CPT

## 2025-05-05 ENCOUNTER — RESULTS FOLLOW-UP (OUTPATIENT)
Dept: FAMILY MEDICINE CLINIC | Facility: CLINIC | Age: 50
End: 2025-05-05

## 2025-05-15 ENCOUNTER — TELEPHONE (OUTPATIENT)
Facility: HOSPITAL | Age: 50
End: 2025-05-15

## 2025-05-15 NOTE — TELEPHONE ENCOUNTER
Telephone Call    [] Called Patient regarding [facility]; Patient enrolled to [facility].    [] Called Patient regarding [facility]; Patient answered call and wants to enroll; Asked for a call back.    [] Called Patient regarding [facility]; Patient answered call; Declined to enroll in program.    [] Called Patient regarding [facility]; Patient did not ; Left voicemail with my name and direct phone number.     [x] Called Patient regarding [facility]; Patient did not ; Unable to leave voicemail.    [] Called Patient regarding [facility]; Phone number is invalid    Attempts (Max 3): []1   [x]2   []3        Additional Notes:

## 2025-05-23 ENCOUNTER — TELEPHONE (OUTPATIENT)
Facility: HOSPITAL | Age: 50
End: 2025-05-23

## 2025-05-23 NOTE — TELEPHONE ENCOUNTER
Telephone Call    [] Called Patient regarding [facility]; Patient enrolled to [facility].    [] Called Patient regarding [facility]; Patient answered call and wants to enroll; Asked for a call back.    [] Called Patient regarding [facility]; Patient answered call; Declined to enroll in program.    [x] Called Patient regarding [facility]; Patient did not ; Left voicemail with my name and direct phone number.     [] Called Patient regarding [facility]; Patient did not ; Unable to leave voicemail.    [] Called Patient regarding [facility]; Phone number is invalid    Attempts (Max 3): []1   []2   [x]3        Additional Notes:

## 2025-05-27 DIAGNOSIS — I10 BENIGN ESSENTIAL HYPERTENSION: ICD-10-CM

## 2025-05-28 RX ORDER — LOSARTAN POTASSIUM 100 MG/1
100 TABLET ORAL DAILY
Qty: 90 TABLET | Refills: 0 | OUTPATIENT
Start: 2025-05-28

## 2025-05-28 RX ORDER — LOSARTAN POTASSIUM 100 MG/1
100 TABLET ORAL DAILY
Qty: 90 TABLET | Refills: 0 | Status: SHIPPED | OUTPATIENT
Start: 2025-05-28

## 2025-05-28 RX ORDER — VERAPAMIL HYDROCHLORIDE 120 MG/1
120 CAPSULE, EXTENDED RELEASE ORAL DAILY
Qty: 90 CAPSULE | Refills: 0 | Status: SHIPPED | OUTPATIENT
Start: 2025-05-28

## 2025-05-28 RX ORDER — VERAPAMIL HYDROCHLORIDE 120 MG/1
120 CAPSULE, EXTENDED RELEASE ORAL DAILY
Qty: 90 CAPSULE | Refills: 0 | OUTPATIENT
Start: 2025-05-28

## 2025-06-02 ENCOUNTER — OFFICE VISIT (OUTPATIENT)
Dept: FAMILY MEDICINE CLINIC | Facility: CLINIC | Age: 50
End: 2025-06-02

## 2025-06-02 VITALS
BODY MASS INDEX: 47.48 KG/M2 | WEIGHT: 285 LBS | OXYGEN SATURATION: 97 % | SYSTOLIC BLOOD PRESSURE: 128 MMHG | DIASTOLIC BLOOD PRESSURE: 90 MMHG | HEIGHT: 65 IN | HEART RATE: 71 BPM

## 2025-06-02 DIAGNOSIS — R60.9 EDEMA, UNSPECIFIED TYPE: ICD-10-CM

## 2025-06-02 DIAGNOSIS — E11.9 TYPE 2 DIABETES MELLITUS WITHOUT COMPLICATION, WITHOUT LONG-TERM CURRENT USE OF INSULIN (HCC): ICD-10-CM

## 2025-06-02 DIAGNOSIS — R51.9 CHRONIC INTRACTABLE HEADACHE, UNSPECIFIED HEADACHE TYPE: ICD-10-CM

## 2025-06-02 DIAGNOSIS — I10 BENIGN ESSENTIAL HYPERTENSION: Primary | ICD-10-CM

## 2025-06-02 DIAGNOSIS — G89.29 CHRONIC INTRACTABLE HEADACHE, UNSPECIFIED HEADACHE TYPE: ICD-10-CM

## 2025-06-02 DIAGNOSIS — E87.6 HYPOKALEMIA: ICD-10-CM

## 2025-06-02 PROCEDURE — 99214 OFFICE O/P EST MOD 30 MIN: CPT | Performed by: PHYSICIAN ASSISTANT

## 2025-06-02 NOTE — ASSESSMENT & PLAN NOTE
Patient continues to follow with neurology.  Has history of brain aneurysm and sees neurosurgery.  Continue good blood pressure control.  She is currently on Topamax therapy.  Orders:  •  Albumin / creatinine urine ratio; Future  •  Comprehensive metabolic panel; Future  •  Hemoglobin A1C; Future  •  CBC and differential; Future  •  Lipid Panel with Direct LDL reflex; Future  •  TSH, 3rd generation with Free T4 reflex; Future

## 2025-06-02 NOTE — PROGRESS NOTES
Name: Jamaica Arrington      : 1975      MRN: [id number]  Encounter Provider: Jamie Rizo PA-C  Encounter Date: 2025   Encounter department: [facility]  :  Assessment & Plan  Benign essential hypertension  Blood pressure has been stable with losartan and verapamil, no medication changes.  Orders:  •  Albumin / creatinine urine ratio; Future  •  Comprehensive metabolic panel; Future  •  Hemoglobin A1C; Future  •  CBC and differential; Future  •  Lipid Panel with Direct LDL reflex; Future  •  TSH, 3rd generation with Free T4 reflex; Future    Type 2 diabetes mellitus without complication, without long-term current use of insulin ([facility])    Lab Results   Component Value Date    HGBA1C 6.4 (H) 2025   Diabetes has been stable with hemoglobin A1c of 6.4, currently with diet control, no medication changes.  Orders:  •  Albumin / creatinine urine ratio; Future  •  Comprehensive metabolic panel; Future  •  Hemoglobin A1C; Future  •  CBC and differential; Future  •  Lipid Panel with Direct LDL reflex; Future  •  TSH, 3rd generation with Free T4 reflex; Future    Chronic intractable headache, unspecified headache type  Patient continues to follow with neurology.  Has history of brain aneurysm and sees neurosurgery.  Continue good blood pressure control.  She is currently on Topamax therapy.  Orders:  •  Albumin / creatinine urine ratio; Future  •  Comprehensive metabolic panel; Future  •  Hemoglobin A1C; Future  •  CBC and differential; Future  •  Lipid Panel with Direct LDL reflex; Future  •  TSH, 3rd generation with Free T4 reflex; Future    Edema, unspecified type         Hypokalemia                History of Present Illness   HPI: This is a 49-year-old female who presents to the office for follow-up of chronic health conditions including type 2 diabetes which has been diet controlled.  She did have recent labs which she is here to review.  Patient has had some increased swelling  "in the lower extremities in the past few days.  She states that she has not had money for her medications in the past few days however and she has been out of her pills.  They were sent to the pharmacy but she was not able to pick them up because of the cost yet.  She did get paid now and is planning to go get some.  She is also concerned because her sister passed of heart failure recently and she was only 3 years older than her.  She did have an echocardiogram last August which showed ejection fraction of 65% without thickening of the wall.      Review of Systems   Constitutional:  Negative for chills, fatigue and fever.   HENT:  Negative for congestion, ear pain and sinus pressure.    Eyes:  Negative for visual disturbance.   Respiratory:  Negative for cough, chest tightness and shortness of breath.    Cardiovascular:  Negative for chest pain and palpitations.   Gastrointestinal:  Negative for diarrhea, nausea and vomiting.   Endocrine: Negative for polyuria.   Genitourinary:  Negative for dysuria and frequency.   Musculoskeletal:  Negative for arthralgias and myalgias.   Skin:  Negative for pallor and rash.   Neurological:  Negative for dizziness, weakness, light-headedness, numbness and headaches.   Psychiatric/Behavioral:  Negative for agitation, behavioral problems and sleep disturbance.    All other systems reviewed and are negative.      Objective   /90 (BP Location: Left arm, Patient Position: Sitting, Cuff Size: Standard)   Pulse 71   Ht 5' 5\" (1.651 m)   Wt 129 kg (285 lb)   LMP  (LMP Unknown)   SpO2 97%   BMI 47.43 kg/m²     Diabetic Foot Exam    Patient's shoes and socks removed.    Right Foot/Ankle   Right Foot Inspection  Skin Exam: skin normal and skin intact. No dry skin, no warmth, no callus, no erythema, no maceration, no abnormal color, no pre-ulcer, no ulcer and no callus.     Toe Exam: ROM and strength within normal limits and swelling.     Sensory   Vibration: " intact  Proprioception: intact  Monofilament testing: intact    Vascular  Capillary refills: < 3 seconds  The right DP pulse is 2+. The right PT pulse is 2+.     Right Toe  - Comprehensive Exam  Ecchymosis: none  Arch: normal  Hammertoes: absent  Claw Toes: absent  Swelling: none   Tenderness: none       Left Foot/Ankle  Left Foot Inspection  Skin Exam: skin normal and skin intact. No dry skin, no warmth, no erythema, no maceration, normal color, no pre-ulcer, no ulcer and no callus.     Toe Exam: ROM and strength within normal limits and swelling.     Sensory   Vibration: intact  Proprioception: intact  Monofilament testing: intact    Vascular  Capillary refills: < 3 seconds  The left DP pulse is 2+. The left PT pulse is 2+.     Left Toe  - Comprehensive Exam  Ecchymosis: none  Arch: normal  Hammertoes: absent  Claw toes: absent  Swelling: none   Tenderness: none       Assign Risk Category  No deformity present  No loss of protective sensation  No weak pulses  Risk: 0       Physical Exam  Constitutional:       General: She is not in acute distress.     Appearance: Normal appearance.   HENT:      Head: Normocephalic and atraumatic.      Right Ear: Tympanic membrane normal.      Left Ear: Tympanic membrane normal.      Nose: No congestion or rhinorrhea.     Eyes:      Conjunctiva/sclera: Conjunctivae normal.      Pupils: Pupils are equal, round, and reactive to light.     Neck:      Vascular: No carotid bruit.     Cardiovascular:      Rate and Rhythm: Normal rate and regular rhythm.      Pulses: no weak pulses.           Dorsalis pedis pulses are 2+ on the right side and 2+ on the left side.        Posterior tibial pulses are 2+ on the right side and 2+ on the left side.      Heart sounds: No murmur heard.  Pulmonary:      Effort: Pulmonary effort is normal. No respiratory distress.      Breath sounds: Normal breath sounds.   Abdominal:      Palpations: Abdomen is soft.     Musculoskeletal:         General: Normal  range of motion.      Cervical back: Normal range of motion and neck supple. No muscular tenderness.      Right lower leg: Edema present.      Left lower leg: Edema present.      Comments: 1+ pitting edema bilateral lower extremities.   Feet:      Right foot:      Skin integrity: No ulcer, skin breakdown, erythema, warmth, callus or dry skin.      Left foot:      Skin integrity: No ulcer, skin breakdown, erythema, warmth, callus or dry skin.   Lymphadenopathy:      Cervical: No cervical adenopathy.     Skin:     General: Skin is warm.      Capillary Refill: Capillary refill takes less than 2 seconds.     Neurological:      General: No focal deficit present.      Mental Status: She is alert and oriented to person, place, and time.     Psychiatric:         Mood and Affect: Mood normal.

## 2025-06-02 NOTE — ASSESSMENT & PLAN NOTE
Lab Results   Component Value Date    HGBA1C 6.4 (H) 04/22/2025   Diabetes has been stable with hemoglobin A1c of 6.4, currently with diet control, no medication changes.  Orders:  •  Albumin / creatinine urine ratio; Future  •  Comprehensive metabolic panel; Future  •  Hemoglobin A1C; Future  •  CBC and differential; Future  •  Lipid Panel with Direct LDL reflex; Future  •  TSH, 3rd generation with Free T4 reflex; Future

## 2025-06-02 NOTE — ASSESSMENT & PLAN NOTE
Blood pressure has been stable with losartan and verapamil, no medication changes.  Orders:  •  Albumin / creatinine urine ratio; Future  •  Comprehensive metabolic panel; Future  •  Hemoglobin A1C; Future  •  CBC and differential; Future  •  Lipid Panel with Direct LDL reflex; Future  •  TSH, 3rd generation with Free T4 reflex; Future

## 2025-06-08 ENCOUNTER — PATIENT MESSAGE (OUTPATIENT)
Dept: FAMILY MEDICINE CLINIC | Facility: CLINIC | Age: 50
End: 2025-06-08

## 2025-06-08 DIAGNOSIS — I10 BENIGN ESSENTIAL HYPERTENSION: ICD-10-CM

## 2025-06-08 DIAGNOSIS — R60.9 EDEMA, UNSPECIFIED TYPE: ICD-10-CM

## 2025-06-08 DIAGNOSIS — E87.6 HYPOKALEMIA: ICD-10-CM

## 2025-06-09 RX ORDER — SPIRONOLACTONE 25 MG/1
25 TABLET ORAL DAILY
Qty: 90 TABLET | Refills: 1 | Status: SHIPPED | OUTPATIENT
Start: 2025-06-09

## 2025-07-14 DIAGNOSIS — G43.109 MIGRAINE WITH AURA AND WITHOUT STATUS MIGRAINOSUS, NOT INTRACTABLE: ICD-10-CM

## 2025-07-14 DIAGNOSIS — R60.9 EDEMA, UNSPECIFIED TYPE: ICD-10-CM

## 2025-07-14 DIAGNOSIS — I10 BENIGN ESSENTIAL HYPERTENSION: ICD-10-CM

## 2025-07-14 DIAGNOSIS — E87.6 HYPOKALEMIA: ICD-10-CM

## 2025-07-14 DIAGNOSIS — G47.00 INSOMNIA, UNSPECIFIED TYPE: ICD-10-CM

## 2025-07-15 RX ORDER — AMITRIPTYLINE HYDROCHLORIDE 50 MG/1
50 TABLET ORAL
Qty: 90 TABLET | Refills: 1 | Status: SHIPPED | OUTPATIENT
Start: 2025-07-15

## 2025-07-15 RX ORDER — AMITRIPTYLINE HYDROCHLORIDE 50 MG/1
50 TABLET ORAL
Qty: 90 TABLET | Refills: 0 | OUTPATIENT
Start: 2025-07-15

## 2025-07-15 RX ORDER — TOPIRAMATE 25 MG/1
50 TABLET, FILM COATED ORAL 2 TIMES DAILY
Qty: 120 TABLET | Refills: 5 | Status: SHIPPED | OUTPATIENT
Start: 2025-07-15

## 2025-07-16 RX ORDER — LOSARTAN POTASSIUM 100 MG/1
100 TABLET ORAL DAILY
Qty: 90 TABLET | Refills: 1 | Status: SHIPPED | OUTPATIENT
Start: 2025-07-16

## 2025-07-16 RX ORDER — POTASSIUM CHLORIDE 750 MG/1
10 TABLET, EXTENDED RELEASE ORAL 2 TIMES DAILY
Qty: 180 TABLET | Refills: 1 | Status: SHIPPED | OUTPATIENT
Start: 2025-07-16

## 2025-07-16 RX ORDER — VERAPAMIL HYDROCHLORIDE 120 MG/1
120 CAPSULE, EXTENDED RELEASE ORAL DAILY
Qty: 90 CAPSULE | Refills: 1 | Status: SHIPPED | OUTPATIENT
Start: 2025-07-16

## 2025-07-29 ENCOUNTER — TELEMEDICINE (OUTPATIENT)
Dept: NEUROLOGY | Facility: CLINIC | Age: 50
End: 2025-07-29
Payer: COMMERCIAL

## 2025-07-29 ENCOUNTER — TELEPHONE (OUTPATIENT)
Dept: NEUROLOGY | Facility: CLINIC | Age: 50
End: 2025-07-29

## 2025-07-29 DIAGNOSIS — E66.01 MORBID OBESITY (HCC): ICD-10-CM

## 2025-07-29 DIAGNOSIS — F32.A ANXIETY AND DEPRESSION: ICD-10-CM

## 2025-07-29 DIAGNOSIS — G47.00 INSOMNIA, UNSPECIFIED TYPE: ICD-10-CM

## 2025-07-29 DIAGNOSIS — F41.9 ANXIETY AND DEPRESSION: ICD-10-CM

## 2025-07-29 DIAGNOSIS — G43.109 MIGRAINE WITH AURA AND WITHOUT STATUS MIGRAINOSUS, NOT INTRACTABLE: Primary | ICD-10-CM

## 2025-07-29 PROCEDURE — 99213 OFFICE O/P EST LOW 20 MIN: CPT | Performed by: STUDENT IN AN ORGANIZED HEALTH CARE EDUCATION/TRAINING PROGRAM

## 2025-08-14 ENCOUNTER — HOSPITAL ENCOUNTER (EMERGENCY)
Facility: HOSPITAL | Age: 50
Discharge: HOME | End: 2025-08-14
Attending: INTERNAL MEDICINE
Payer: COMMERCIAL

## 2025-08-14 ENCOUNTER — TELEPHONE (OUTPATIENT)
Age: 50
End: 2025-08-14

## 2025-08-15 ENCOUNTER — HOSPITAL ENCOUNTER (INPATIENT)
Facility: HOSPITAL | Age: 50
LOS: 3 days | Discharge: HOME | DRG: 103 | End: 2025-08-19
Attending: EMERGENCY MEDICINE | Admitting: INTERNAL MEDICINE
Payer: COMMERCIAL

## 2025-08-15 ENCOUNTER — HOSPITAL ENCOUNTER (EMERGENCY)
Facility: HOSPITAL | Age: 50
Discharge: HOME | End: 2025-08-15
Attending: EMERGENCY MEDICINE
Payer: COMMERCIAL

## 2025-08-15 ENCOUNTER — APPOINTMENT (EMERGENCY)
Dept: CT IMAGING | Facility: HOSPITAL | Age: 50
DRG: 103 | End: 2025-08-15
Payer: COMMERCIAL

## 2025-08-15 ENCOUNTER — APPOINTMENT (EMERGENCY)
Dept: CT IMAGING | Facility: HOSPITAL | Age: 50
End: 2025-08-15
Payer: COMMERCIAL

## 2025-08-15 DIAGNOSIS — R55 NEAR SYNCOPE: ICD-10-CM

## 2025-08-15 DIAGNOSIS — R29.818 TRANSIENT NEUROLOGICAL SYMPTOMS: ICD-10-CM

## 2025-08-15 DIAGNOSIS — R51.9 HEADACHE: Primary | ICD-10-CM

## 2025-08-15 DIAGNOSIS — I67.1 CEREBRAL ANEURYSM: ICD-10-CM

## 2025-08-15 PROCEDURE — 96367 TX/PROPH/DG ADDL SEQ IV INF: CPT

## 2025-08-15 PROCEDURE — 93005 ELECTROCARDIOGRAM TRACING: CPT

## 2025-08-15 PROCEDURE — 96361 HYDRATE IV INFUSION ADD-ON: CPT

## 2025-08-15 PROCEDURE — 96365 THER/PROPH/DIAG IV INF INIT: CPT

## 2025-08-15 PROCEDURE — 99285 EMERGENCY DEPT VISIT HI MDM: CPT | Performed by: EMERGENCY MEDICINE

## 2025-08-15 PROCEDURE — 70498 CT ANGIOGRAPHY NECK: CPT

## 2025-08-15 PROCEDURE — 96375 TX/PRO/DX INJ NEW DRUG ADDON: CPT

## 2025-08-15 PROCEDURE — 99285 EMERGENCY DEPT VISIT HI MDM: CPT

## 2025-08-15 PROCEDURE — 70496 CT ANGIOGRAPHY HEAD: CPT

## 2025-08-15 RX ORDER — ACETAMINOPHEN 10 MG/ML
1000 INJECTION, SOLUTION INTRAVENOUS ONCE
Status: COMPLETED | OUTPATIENT
Start: 2025-08-15 | End: 2025-08-15

## 2025-08-15 RX ORDER — METOCLOPRAMIDE HYDROCHLORIDE 5 MG/ML
10 INJECTION INTRAMUSCULAR; INTRAVENOUS ONCE
Status: COMPLETED | OUTPATIENT
Start: 2025-08-15 | End: 2025-08-15

## 2025-08-15 RX ORDER — MAGNESIUM SULFATE HEPTAHYDRATE 40 MG/ML
2 INJECTION, SOLUTION INTRAVENOUS ONCE
Status: COMPLETED | OUTPATIENT
Start: 2025-08-15 | End: 2025-08-15

## 2025-08-15 RX ADMIN — ACETAMINOPHEN 1000 MG: 10 INJECTION INTRAVENOUS at 20:41

## 2025-08-15 RX ADMIN — METOCLOPRAMIDE 10 MG: 5 INJECTION, SOLUTION INTRAMUSCULAR; INTRAVENOUS at 20:58

## 2025-08-15 RX ADMIN — IOHEXOL 85 ML: 350 INJECTION, SOLUTION INTRAVENOUS at 22:06

## 2025-08-15 RX ADMIN — SODIUM CHLORIDE 1000 ML: 0.9 INJECTION, SOLUTION INTRAVENOUS at 20:41

## 2025-08-15 RX ADMIN — MAGNESIUM SULFATE HEPTAHYDRATE 2 G: 40 INJECTION, SOLUTION INTRAVENOUS at 22:36

## 2025-08-16 PROBLEM — G44.89 OTHER HEADACHE SYNDROME: Status: ACTIVE | Noted: 2025-01-20

## 2025-08-16 PROBLEM — E66.813 CLASS 3 SEVERE OBESITY DUE TO EXCESS CALORIES WITH SERIOUS COMORBIDITY AND BODY MASS INDEX (BMI) OF 45.0 TO 49.9 IN ADULT: Status: ACTIVE | Noted: 2022-03-18

## 2025-08-16 LAB
ANION GAP SERPL CALCULATED.3IONS-SCNC: 5 MMOL/L (ref 4–13)
ATRIAL RATE: 77 BPM
ATRIAL RATE: 83 BPM
BILIRUB UR QL STRIP: NEGATIVE
BUN SERPL-MCNC: 9 MG/DL (ref 5–25)
CALCIUM SERPL-MCNC: 8.7 MG/DL (ref 8.4–10.2)
CHLORIDE SERPL-SCNC: 106 MMOL/L (ref 96–108)
CLARITY UR: CLEAR
CO2 SERPL-SCNC: 26 MMOL/L (ref 21–32)
COLOR UR: NORMAL
CREAT SERPL-MCNC: 0.67 MG/DL (ref 0.6–1.3)
ERYTHROCYTE [DISTWIDTH] IN BLOOD BY AUTOMATED COUNT: 16.7 % (ref 11.6–15.1)
ERYTHROCYTE [SEDIMENTATION RATE] IN BLOOD: 55 MM/HOUR (ref 0–19)
GFR SERPL CREATININE-BSD FRML MDRD: 104 ML/MIN/1.73SQ M
GLUCOSE SERPL-MCNC: 102 MG/DL (ref 65–140)
GLUCOSE SERPL-MCNC: 111 MG/DL (ref 65–140)
GLUCOSE SERPL-MCNC: 124 MG/DL (ref 65–140)
GLUCOSE SERPL-MCNC: 130 MG/DL (ref 65–140)
GLUCOSE SERPL-MCNC: 130 MG/DL (ref 65–140)
GLUCOSE UR STRIP-MCNC: NEGATIVE MG/DL
HCT VFR BLD AUTO: 36 % (ref 34.8–46.1)
HGB BLD-MCNC: 11.1 G/DL (ref 11.5–15.4)
HGB UR QL STRIP.AUTO: NEGATIVE
KETONES UR STRIP-MCNC: NEGATIVE MG/DL
LEUKOCYTE ESTERASE UR QL STRIP: NEGATIVE
MCH RBC QN AUTO: 23.2 PG (ref 26.8–34.3)
MCHC RBC AUTO-ENTMCNC: 30.8 G/DL (ref 31.4–37.4)
MCV RBC AUTO: 75 FL (ref 82–98)
NITRITE UR QL STRIP: NEGATIVE
P AXIS: 55 DEGREES
P AXIS: 57 DEGREES
PH UR STRIP.AUTO: 7 [PH]
PLATELET # BLD AUTO: 282 THOUSANDS/UL (ref 149–390)
PMV BLD AUTO: 10.1 FL (ref 8.9–12.7)
POTASSIUM SERPL-SCNC: 3.8 MMOL/L (ref 3.5–5.3)
PR INTERVAL: 168 MS
PR INTERVAL: 176 MS
PROT UR STRIP-MCNC: NEGATIVE MG/DL
QRS AXIS: 46 DEGREES
QRS AXIS: 55 DEGREES
QRSD INTERVAL: 72 MS
QRSD INTERVAL: 74 MS
QT INTERVAL: 378 MS
QT INTERVAL: 402 MS
QTC INTERVAL: 444 MS
QTC INTERVAL: 455 MS
RBC # BLD AUTO: 4.78 MILLION/UL (ref 3.81–5.12)
SODIUM SERPL-SCNC: 137 MMOL/L (ref 135–147)
SP GR UR STRIP.AUTO: 1.01 (ref 1–1.03)
T WAVE AXIS: 27 DEGREES
T WAVE AXIS: 69 DEGREES
UROBILINOGEN UR STRIP-ACNC: <2 MG/DL
VENTRICULAR RATE: 77 BPM
VENTRICULAR RATE: 83 BPM
WBC # BLD AUTO: 5.86 THOUSAND/UL (ref 4.31–10.16)

## 2025-08-16 PROCEDURE — 80048 BASIC METABOLIC PNL TOTAL CA: CPT

## 2025-08-16 PROCEDURE — 99223 1ST HOSP IP/OBS HIGH 75: CPT | Performed by: INTERNAL MEDICINE

## 2025-08-16 PROCEDURE — 93010 ELECTROCARDIOGRAM REPORT: CPT | Performed by: INTERNAL MEDICINE

## 2025-08-16 PROCEDURE — 82948 REAGENT STRIP/BLOOD GLUCOSE: CPT

## 2025-08-16 PROCEDURE — 93005 ELECTROCARDIOGRAM TRACING: CPT

## 2025-08-16 PROCEDURE — 85027 COMPLETE CBC AUTOMATED: CPT

## 2025-08-16 PROCEDURE — 81003 URINALYSIS AUTO W/O SCOPE: CPT | Performed by: NURSE PRACTITIONER

## 2025-08-16 PROCEDURE — 99223 1ST HOSP IP/OBS HIGH 75: CPT | Performed by: PSYCHIATRY & NEUROLOGY

## 2025-08-16 PROCEDURE — 85652 RBC SED RATE AUTOMATED: CPT

## 2025-08-16 RX ORDER — ACETAMINOPHEN 325 MG/1
975 TABLET ORAL EVERY 8 HOURS PRN
Status: DISCONTINUED | OUTPATIENT
Start: 2025-08-16 | End: 2025-08-19 | Stop reason: HOSPADM

## 2025-08-16 RX ORDER — VERAPAMIL HYDROCHLORIDE 120 MG/1
120 TABLET, FILM COATED, EXTENDED RELEASE ORAL DAILY
Status: DISCONTINUED | OUTPATIENT
Start: 2025-08-16 | End: 2025-08-19 | Stop reason: HOSPADM

## 2025-08-16 RX ORDER — FUROSEMIDE 20 MG/1
20 TABLET ORAL 2 TIMES DAILY
Status: DISCONTINUED | OUTPATIENT
Start: 2025-08-16 | End: 2025-08-16

## 2025-08-16 RX ORDER — LOSARTAN POTASSIUM 50 MG/1
100 TABLET ORAL DAILY
Status: DISCONTINUED | OUTPATIENT
Start: 2025-08-16 | End: 2025-08-19 | Stop reason: HOSPADM

## 2025-08-16 RX ORDER — MAGNESIUM HYDROXIDE/ALUMINUM HYDROXICE/SIMETHICONE 120; 1200; 1200 MG/30ML; MG/30ML; MG/30ML
30 SUSPENSION ORAL EVERY 6 HOURS PRN
Status: DISCONTINUED | OUTPATIENT
Start: 2025-08-16 | End: 2025-08-19 | Stop reason: HOSPADM

## 2025-08-16 RX ORDER — ONDANSETRON 2 MG/ML
4 INJECTION INTRAMUSCULAR; INTRAVENOUS EVERY 6 HOURS PRN
Status: DISCONTINUED | OUTPATIENT
Start: 2025-08-16 | End: 2025-08-19 | Stop reason: HOSPADM

## 2025-08-16 RX ORDER — LIDOCAINE HYDROCHLORIDE 20 MG/ML
10 INJECTION, SOLUTION EPIDURAL; INFILTRATION; INTRACAUDAL; PERINEURAL ONCE
Status: COMPLETED | OUTPATIENT
Start: 2025-08-16 | End: 2025-08-16

## 2025-08-16 RX ORDER — POLYETHYLENE GLYCOL 3350 17 G/17G
17 POWDER, FOR SOLUTION ORAL DAILY PRN
Status: DISCONTINUED | OUTPATIENT
Start: 2025-08-16 | End: 2025-08-19 | Stop reason: HOSPADM

## 2025-08-16 RX ORDER — SPIRONOLACTONE 25 MG/1
25 TABLET ORAL DAILY
Status: DISCONTINUED | OUTPATIENT
Start: 2025-08-16 | End: 2025-08-19 | Stop reason: HOSPADM

## 2025-08-16 RX ADMIN — LIDOCAINE HYDROCHLORIDE 10 ML: 20 INJECTION, SOLUTION EPIDURAL; INFILTRATION; INTRACAUDAL at 00:30

## 2025-08-16 RX ADMIN — SPIRONOLACTONE 25 MG: 25 TABLET ORAL at 08:14

## 2025-08-16 RX ADMIN — LOSARTAN POTASSIUM 100 MG: 50 TABLET, FILM COATED ORAL at 08:14

## 2025-08-16 RX ADMIN — VERAPAMIL HYDROCHLORIDE 120 MG: 120 TABLET ORAL at 08:14

## 2025-08-16 RX ADMIN — AMITRIPTYLINE HYDROCHLORIDE 50 MG: 25 TABLET, FILM COATED ORAL at 22:15

## 2025-08-17 LAB
ATRIAL RATE: 73 BPM
GLUCOSE SERPL-MCNC: 104 MG/DL (ref 65–140)
GLUCOSE SERPL-MCNC: 121 MG/DL (ref 65–140)
GLUCOSE SERPL-MCNC: 125 MG/DL (ref 65–140)
GLUCOSE SERPL-MCNC: 98 MG/DL (ref 65–140)
P AXIS: 67 DEGREES
PR INTERVAL: 178 MS
QRS AXIS: 58 DEGREES
QRSD INTERVAL: 80 MS
QT INTERVAL: 410 MS
QTC INTERVAL: 452 MS
T WAVE AXIS: 58 DEGREES
VENTRICULAR RATE: 73 BPM

## 2025-08-17 PROCEDURE — 99232 SBSQ HOSP IP/OBS MODERATE 35: CPT | Performed by: INTERNAL MEDICINE

## 2025-08-17 PROCEDURE — 93005 ELECTROCARDIOGRAM TRACING: CPT

## 2025-08-17 PROCEDURE — 93010 ELECTROCARDIOGRAM REPORT: CPT | Performed by: INTERNAL MEDICINE

## 2025-08-17 PROCEDURE — 82948 REAGENT STRIP/BLOOD GLUCOSE: CPT

## 2025-08-17 RX ORDER — HEPARIN SODIUM 5000 [USP'U]/ML
7500 INJECTION, SOLUTION INTRAVENOUS; SUBCUTANEOUS EVERY 8 HOURS SCHEDULED
Status: DISCONTINUED | OUTPATIENT
Start: 2025-08-17 | End: 2025-08-19 | Stop reason: HOSPADM

## 2025-08-17 RX ADMIN — LOSARTAN POTASSIUM 100 MG: 50 TABLET, FILM COATED ORAL at 08:56

## 2025-08-17 RX ADMIN — VERAPAMIL HYDROCHLORIDE 120 MG: 120 TABLET ORAL at 08:56

## 2025-08-17 RX ADMIN — ACETAMINOPHEN 975 MG: 325 TABLET ORAL at 09:00

## 2025-08-17 RX ADMIN — AMITRIPTYLINE HYDROCHLORIDE 50 MG: 25 TABLET, FILM COATED ORAL at 22:49

## 2025-08-17 RX ADMIN — HEPARIN SODIUM 7500 UNITS: 5000 INJECTION INTRAVENOUS; SUBCUTANEOUS at 22:49

## 2025-08-17 RX ADMIN — HEPARIN SODIUM 7500 UNITS: 5000 INJECTION INTRAVENOUS; SUBCUTANEOUS at 16:02

## 2025-08-17 RX ADMIN — RIMEGEPANT SULFATE 75 MG: 75 TABLET, ORALLY DISINTEGRATING ORAL at 13:38

## 2025-08-17 RX ADMIN — SPIRONOLACTONE 25 MG: 25 TABLET ORAL at 08:56

## 2025-08-18 ENCOUNTER — APPOINTMENT (INPATIENT)
Dept: MRI IMAGING | Facility: HOSPITAL | Age: 50
DRG: 103 | End: 2025-08-18
Payer: COMMERCIAL

## 2025-08-18 ENCOUNTER — APPOINTMENT (INPATIENT)
Dept: NON INVASIVE DIAGNOSTICS | Facility: HOSPITAL | Age: 50
DRG: 103 | End: 2025-08-18
Payer: COMMERCIAL

## 2025-08-18 LAB
AORTIC ROOT: 3 CM
AORTIC VALVE MEAN VELOCITY: 9.1 M/S
ASCENDING AORTA: 3.1 CM
ATRIAL RATE: 70 BPM
AV AREA BY CONTINUOUS VTI: 2.4 CM2
AV AREA PEAK VELOCITY: 2.1 CM2
AV LVOT MEAN GRADIENT: 2 MMHG
AV LVOT PEAK GRADIENT: 3 MMHG
AV MEAN PRESS GRAD SYS DOP V1V2: 4 MMHG
AV ORIFICE AREA US: 2.36 CM2
AV PEAK GRADIENT: 7 MMHG
AV VELOCITY RATIO: 0.75
AV VMAX SYS DOP: 1.29 M/S
BSA FOR ECHO PROCEDURE: 2.31 M2
DOP CALC AO VTI: 25.35 CM
DOP CALC LVOT AREA: 3.14 CM2
DOP CALC LVOT CARDIAC INDEX: 2.26 L/MIN/M2
DOP CALC LVOT CARDIAC OUTPUT: 5.22 L/MIN
DOP CALC LVOT DIAMETER: 2 CM
DOP CALC LVOT PEAK VEL VTI: 19.06 CM
DOP CALC LVOT PEAK VEL: 0.86 M/S
DOP CALC LVOT STROKE INDEX: 26.8 ML/M2
DOP CALC LVOT STROKE VOLUME: 59.85
E WAVE DECELERATION TIME: 239 MS
E/A RATIO: 1.07
FRACTIONAL SHORTENING: 29 (ref 28–44)
GLUCOSE SERPL-MCNC: 101 MG/DL (ref 65–140)
GLUCOSE SERPL-MCNC: 105 MG/DL (ref 65–140)
GLUCOSE SERPL-MCNC: 108 MG/DL (ref 65–140)
GLUCOSE SERPL-MCNC: 114 MG/DL (ref 65–140)
INTERVENTRICULAR SEPTUM IN DIASTOLE (PARASTERNAL SHORT AXIS VIEW): 1.2 CM
INTERVENTRICULAR SEPTUM: 1.2 CM (ref 0.6–1.1)
LAAS-AP2: 19.6 CM2
LAAS-AP4: 22 CM2
LEFT ATRIUM SIZE: 3.3 CM
LEFT ATRIUM VOLUME (MOD BIPLANE): 64 ML
LEFT ATRIUM VOLUME INDEX (MOD BIPLANE): 27.7 ML/M2
LEFT INTERNAL DIMENSION IN SYSTOLE: 3 CM (ref 2.1–4)
LEFT VENTRICULAR INTERNAL DIMENSION IN DIASTOLE: 4.2 CM (ref 3.5–6)
LEFT VENTRICULAR POSTERIOR WALL IN END DIASTOLE: 1.3 CM
LEFT VENTRICULAR STROKE VOLUME: 42 ML
LV EF US.2D.A4C+ESTIMATED: 64 %
LVSV (TEICH): 42 ML
MV E'TISSUE VEL-LAT: 7 CM/S
MV E'TISSUE VEL-SEP: 8 CM/S
MV PEAK A VEL: 0.76 M/S
MV PEAK E VEL: 81 CM/S
P AXIS: 73 DEGREES
PR INTERVAL: 174 MS
QRS AXIS: 66 DEGREES
QRSD INTERVAL: 74 MS
QT INTERVAL: 408 MS
QTC INTERVAL: 441 MS
RIGHT ATRIAL 2D VOLUME: 40 ML
RIGHT ATRIUM AREA SYSTOLE A4C: 15.6 CM2
RIGHT VENTRICLE ID DIMENSION: 3.7 CM
SL CV LEFT ATRIUM LENGTH A2C: 5.3 CM
SL CV PED ECHO LEFT VENTRICLE DIASTOLIC VOLUME (MOD BIPLANE) 2D: 78 ML
SL CV PED ECHO LEFT VENTRICLE SYSTOLIC VOLUME (MOD BIPLANE) 2D: 36 ML
T WAVE AXIS: 41 DEGREES
TRICUSPID ANNULAR PLANE SYSTOLIC EXCURSION: 2.5 CM
VENTRICULAR RATE: 70 BPM

## 2025-08-18 PROCEDURE — NC001 PR NO CHARGE: Performed by: NEUROLOGICAL SURGERY

## 2025-08-18 PROCEDURE — 93005 ELECTROCARDIOGRAM TRACING: CPT

## 2025-08-18 PROCEDURE — 82948 REAGENT STRIP/BLOOD GLUCOSE: CPT

## 2025-08-18 PROCEDURE — 93306 TTE W/DOPPLER COMPLETE: CPT | Performed by: INTERNAL MEDICINE

## 2025-08-18 PROCEDURE — 93010 ELECTROCARDIOGRAM REPORT: CPT | Performed by: INTERNAL MEDICINE

## 2025-08-18 PROCEDURE — 70551 MRI BRAIN STEM W/O DYE: CPT

## 2025-08-18 PROCEDURE — 99232 SBSQ HOSP IP/OBS MODERATE 35: CPT

## 2025-08-18 PROCEDURE — 70544 MR ANGIOGRAPHY HEAD W/O DYE: CPT

## 2025-08-18 PROCEDURE — 93306 TTE W/DOPPLER COMPLETE: CPT

## 2025-08-18 RX ADMIN — HEPARIN SODIUM 7500 UNITS: 5000 INJECTION INTRAVENOUS; SUBCUTANEOUS at 21:38

## 2025-08-18 RX ADMIN — VERAPAMIL HYDROCHLORIDE 120 MG: 120 TABLET ORAL at 08:48

## 2025-08-18 RX ADMIN — HEPARIN SODIUM 7500 UNITS: 5000 INJECTION INTRAVENOUS; SUBCUTANEOUS at 05:08

## 2025-08-18 RX ADMIN — LOSARTAN POTASSIUM 100 MG: 50 TABLET, FILM COATED ORAL at 08:48

## 2025-08-18 RX ADMIN — SPIRONOLACTONE 25 MG: 25 TABLET ORAL at 08:48

## 2025-08-18 RX ADMIN — AMITRIPTYLINE HYDROCHLORIDE 50 MG: 25 TABLET, FILM COATED ORAL at 21:38

## 2025-08-18 RX ADMIN — HEPARIN SODIUM 7500 UNITS: 5000 INJECTION INTRAVENOUS; SUBCUTANEOUS at 16:01

## 2025-08-19 ENCOUNTER — HOSPITAL ENCOUNTER (EMERGENCY)
Facility: HOSPITAL | Age: 50
Discharge: HOME | End: 2025-08-19
Attending: EMERGENCY MEDICINE | Admitting: EMERGENCY MEDICINE
Payer: COMMERCIAL

## 2025-08-19 ENCOUNTER — TELEPHONE (OUTPATIENT)
Dept: FAMILY MEDICINE CLINIC | Facility: CLINIC | Age: 50
End: 2025-08-19

## 2025-08-19 VITALS
OXYGEN SATURATION: 90 % | SYSTOLIC BLOOD PRESSURE: 124 MMHG | DIASTOLIC BLOOD PRESSURE: 73 MMHG | WEIGHT: 288 LBS | HEART RATE: 74 BPM | RESPIRATION RATE: 18 BRPM | HEIGHT: 65 IN | BODY MASS INDEX: 47.98 KG/M2 | TEMPERATURE: 97.1 F

## 2025-08-19 VITALS
RESPIRATION RATE: 20 BRPM | HEART RATE: 86 BPM | DIASTOLIC BLOOD PRESSURE: 74 MMHG | SYSTOLIC BLOOD PRESSURE: 147 MMHG | TEMPERATURE: 98.1 F | OXYGEN SATURATION: 99 %

## 2025-08-19 DIAGNOSIS — R53.1 WEAKNESS: ICD-10-CM

## 2025-08-19 DIAGNOSIS — R42 DIZZINESS: Primary | ICD-10-CM

## 2025-08-19 LAB
ALBUMIN SERPL BCG-MCNC: 4.2 G/DL (ref 3.5–5)
ALP SERPL-CCNC: 83 U/L (ref 34–104)
ALT SERPL W P-5'-P-CCNC: 13 U/L (ref 7–52)
ANION GAP SERPL CALCULATED.3IONS-SCNC: 7 MMOL/L (ref 4–13)
ANION GAP SERPL CALCULATED.3IONS-SCNC: 7 MMOL/L (ref 4–13)
AST SERPL W P-5'-P-CCNC: 22 U/L (ref 13–39)
ATRIAL RATE: 74 BPM
ATRIAL RATE: 74 BPM
ATRIAL RATE: 83 BPM
BASOPHILS # BLD AUTO: 0.03 THOUSANDS/ÂΜL (ref 0–0.1)
BASOPHILS NFR BLD AUTO: 0 % (ref 0–1)
BILIRUB SERPL-MCNC: 0.65 MG/DL (ref 0.2–1)
BILIRUB UR QL STRIP: NEGATIVE
BUN SERPL-MCNC: 9 MG/DL (ref 5–25)
BUN SERPL-MCNC: 9 MG/DL (ref 5–25)
CALCIUM SERPL-MCNC: 9.4 MG/DL (ref 8.4–10.2)
CALCIUM SERPL-MCNC: 9.6 MG/DL (ref 8.4–10.2)
CARDIAC TROPONIN I PNL SERPL HS: 3 NG/L (ref ?–50)
CHLORIDE SERPL-SCNC: 101 MMOL/L (ref 96–108)
CHLORIDE SERPL-SCNC: 99 MMOL/L (ref 96–108)
CLARITY UR: CLEAR
CO2 SERPL-SCNC: 29 MMOL/L (ref 21–32)
CO2 SERPL-SCNC: 29 MMOL/L (ref 21–32)
COLOR UR: COLORLESS
CREAT SERPL-MCNC: 0.78 MG/DL (ref 0.6–1.3)
CREAT SERPL-MCNC: 0.78 MG/DL (ref 0.6–1.3)
EOSINOPHIL # BLD AUTO: 0.16 THOUSAND/ÂΜL (ref 0–0.61)
EOSINOPHIL NFR BLD AUTO: 2 % (ref 0–6)
ERYTHROCYTE [DISTWIDTH] IN BLOOD BY AUTOMATED COUNT: 16.3 % (ref 11.6–15.1)
ERYTHROCYTE [DISTWIDTH] IN BLOOD BY AUTOMATED COUNT: 16.5 % (ref 11.6–15.1)
GFR SERPL CREATININE-BSD FRML MDRD: 90 ML/MIN/1.73SQ M
GFR SERPL CREATININE-BSD FRML MDRD: 90 ML/MIN/1.73SQ M
GLUCOSE SERPL-MCNC: 101 MG/DL (ref 65–140)
GLUCOSE SERPL-MCNC: 126 MG/DL (ref 65–140)
GLUCOSE SERPL-MCNC: 132 MG/DL (ref 65–140)
GLUCOSE SERPL-MCNC: 98 MG/DL (ref 65–140)
GLUCOSE UR STRIP-MCNC: NEGATIVE MG/DL
HCT VFR BLD AUTO: 38.5 % (ref 34.8–46.1)
HCT VFR BLD AUTO: 40.4 % (ref 34.8–46.1)
HGB BLD-MCNC: 11.9 G/DL (ref 11.5–15.4)
HGB BLD-MCNC: 12.6 G/DL (ref 11.5–15.4)
HGB UR QL STRIP.AUTO: NEGATIVE
IMM GRANULOCYTES # BLD AUTO: 0.05 THOUSAND/UL (ref 0–0.2)
IMM GRANULOCYTES NFR BLD AUTO: 1 % (ref 0–2)
KETONES UR STRIP-MCNC: NEGATIVE MG/DL
LEUKOCYTE ESTERASE UR QL STRIP: NEGATIVE
LYMPHOCYTES # BLD AUTO: 2.25 THOUSANDS/ÂΜL (ref 0.6–4.47)
LYMPHOCYTES NFR BLD AUTO: 27 % (ref 14–44)
MAGNESIUM SERPL-MCNC: 1.9 MG/DL (ref 1.9–2.7)
MAGNESIUM SERPL-MCNC: 2 MG/DL (ref 1.9–2.7)
MCH RBC QN AUTO: 23.1 PG (ref 26.8–34.3)
MCH RBC QN AUTO: 23.3 PG (ref 26.8–34.3)
MCHC RBC AUTO-ENTMCNC: 30.9 G/DL (ref 31.4–37.4)
MCHC RBC AUTO-ENTMCNC: 31.2 G/DL (ref 31.4–37.4)
MCV RBC AUTO: 75 FL (ref 82–98)
MCV RBC AUTO: 75 FL (ref 82–98)
MONOCYTES # BLD AUTO: 0.64 THOUSAND/ÂΜL (ref 0.17–1.22)
MONOCYTES NFR BLD AUTO: 8 % (ref 4–12)
NEUTROPHILS # BLD AUTO: 5.14 THOUSANDS/ÂΜL (ref 1.85–7.62)
NEUTS SEG NFR BLD AUTO: 62 % (ref 43–75)
NITRITE UR QL STRIP: NEGATIVE
NRBC BLD AUTO-RTO: 0 /100 WBCS
P AXIS: 63 DEGREES
P AXIS: 67 DEGREES
P AXIS: 70 DEGREES
PH UR STRIP.AUTO: 6.5 [PH]
PLATELET # BLD AUTO: 300 THOUSANDS/UL (ref 149–390)
PLATELET # BLD AUTO: 323 THOUSANDS/UL (ref 149–390)
PMV BLD AUTO: 10.1 FL (ref 8.9–12.7)
PMV BLD AUTO: 10.3 FL (ref 8.9–12.7)
POTASSIUM SERPL-SCNC: 3.9 MMOL/L (ref 3.5–5.3)
POTASSIUM SERPL-SCNC: 4.1 MMOL/L (ref 3.5–5.3)
PR INTERVAL: 164 MS
PR INTERVAL: 168 MS
PR INTERVAL: 174 MS
PROT SERPL-MCNC: 8.1 G/DL (ref 6.4–8.4)
PROT UR STRIP-MCNC: NEGATIVE MG/DL
QRS AXIS: 63 DEGREES
QRS AXIS: 64 DEGREES
QRS AXIS: 67 DEGREES
QRSD INTERVAL: 72 MS
QRSD INTERVAL: 74 MS
QRSD INTERVAL: 74 MS
QT INTERVAL: 366 MS
QT INTERVAL: 404 MS
QT INTERVAL: 404 MS
QTC INTERVAL: 430 MS
QTC INTERVAL: 449 MS
QTC INTERVAL: 449 MS
RBC # BLD AUTO: 5.16 MILLION/UL (ref 3.81–5.12)
RBC # BLD AUTO: 5.41 MILLION/UL (ref 3.81–5.12)
SODIUM SERPL-SCNC: 135 MMOL/L (ref 135–147)
SODIUM SERPL-SCNC: 137 MMOL/L (ref 135–147)
SP GR UR STRIP.AUTO: 1.01 (ref 1–1.03)
T WAVE AXIS: 63 DEGREES
T WAVE AXIS: 64 DEGREES
T WAVE AXIS: 7 DEGREES
T4 FREE SERPL-MCNC: 0.68 NG/DL (ref 0.61–1.12)
TSH SERPL DL<=0.05 MIU/L-ACNC: 4.95 UIU/ML (ref 0.45–4.5)
UROBILINOGEN UR STRIP-ACNC: <2 MG/DL
VENTRICULAR RATE: 74 BPM
VENTRICULAR RATE: 74 BPM
VENTRICULAR RATE: 83 BPM
WBC # BLD AUTO: 8.04 THOUSAND/UL (ref 4.31–10.16)
WBC # BLD AUTO: 8.27 THOUSAND/UL (ref 4.31–10.16)

## 2025-08-19 PROCEDURE — 84439 ASSAY OF FREE THYROXINE: CPT | Performed by: EMERGENCY MEDICINE

## 2025-08-19 PROCEDURE — 83735 ASSAY OF MAGNESIUM: CPT

## 2025-08-19 PROCEDURE — 84443 ASSAY THYROID STIM HORMONE: CPT | Performed by: EMERGENCY MEDICINE

## 2025-08-19 PROCEDURE — 96360 HYDRATION IV INFUSION INIT: CPT

## 2025-08-19 PROCEDURE — 83735 ASSAY OF MAGNESIUM: CPT | Performed by: EMERGENCY MEDICINE

## 2025-08-19 PROCEDURE — 93010 ELECTROCARDIOGRAM REPORT: CPT | Performed by: STUDENT IN AN ORGANIZED HEALTH CARE EDUCATION/TRAINING PROGRAM

## 2025-08-19 PROCEDURE — 99285 EMERGENCY DEPT VISIT HI MDM: CPT

## 2025-08-19 PROCEDURE — 99285 EMERGENCY DEPT VISIT HI MDM: CPT | Performed by: EMERGENCY MEDICINE

## 2025-08-19 PROCEDURE — 36415 COLL VENOUS BLD VENIPUNCTURE: CPT | Performed by: EMERGENCY MEDICINE

## 2025-08-19 PROCEDURE — 93010 ELECTROCARDIOGRAM REPORT: CPT | Performed by: INTERNAL MEDICINE

## 2025-08-19 PROCEDURE — 80048 BASIC METABOLIC PNL TOTAL CA: CPT

## 2025-08-19 PROCEDURE — 85027 COMPLETE CBC AUTOMATED: CPT

## 2025-08-19 PROCEDURE — 93005 ELECTROCARDIOGRAM TRACING: CPT

## 2025-08-19 PROCEDURE — 80053 COMPREHEN METABOLIC PANEL: CPT | Performed by: EMERGENCY MEDICINE

## 2025-08-19 PROCEDURE — 81003 URINALYSIS AUTO W/O SCOPE: CPT | Performed by: EMERGENCY MEDICINE

## 2025-08-19 PROCEDURE — 99239 HOSP IP/OBS DSCHRG MGMT >30: CPT

## 2025-08-19 PROCEDURE — 84484 ASSAY OF TROPONIN QUANT: CPT | Performed by: EMERGENCY MEDICINE

## 2025-08-19 PROCEDURE — 85025 COMPLETE CBC W/AUTO DIFF WBC: CPT | Performed by: EMERGENCY MEDICINE

## 2025-08-19 PROCEDURE — 82948 REAGENT STRIP/BLOOD GLUCOSE: CPT

## 2025-08-19 PROCEDURE — 96361 HYDRATE IV INFUSION ADD-ON: CPT

## 2025-08-19 RX ORDER — MECLIZINE HYDROCHLORIDE 25 MG/1
25 TABLET ORAL ONCE
Status: COMPLETED | OUTPATIENT
Start: 2025-08-19 | End: 2025-08-19

## 2025-08-19 RX ADMIN — HEPARIN SODIUM 7500 UNITS: 5000 INJECTION INTRAVENOUS; SUBCUTANEOUS at 06:04

## 2025-08-19 RX ADMIN — SODIUM CHLORIDE 1000 ML: 0.9 INJECTION, SOLUTION INTRAVENOUS at 15:45

## 2025-08-19 RX ADMIN — RIMEGEPANT SULFATE 75 MG: 75 TABLET, ORALLY DISINTEGRATING ORAL at 08:42

## 2025-08-19 RX ADMIN — SPIRONOLACTONE 25 MG: 25 TABLET ORAL at 08:35

## 2025-08-19 RX ADMIN — VERAPAMIL HYDROCHLORIDE 120 MG: 120 TABLET ORAL at 08:36

## 2025-08-19 RX ADMIN — MECLIZINE HYDROCHLORIDE 25 MG: 25 TABLET ORAL at 15:43

## 2025-08-19 RX ADMIN — LOSARTAN POTASSIUM 100 MG: 50 TABLET, FILM COATED ORAL at 08:35

## 2025-08-20 ENCOUNTER — PATIENT OUTREACH (OUTPATIENT)
Dept: CASE MANAGEMENT | Facility: OTHER | Age: 50
End: 2025-08-20

## 2025-08-21 ENCOUNTER — TELEPHONE (OUTPATIENT)
Age: 50
End: 2025-08-21